# Patient Record
Sex: FEMALE | Race: WHITE | Employment: UNEMPLOYED | URBAN - METROPOLITAN AREA
[De-identification: names, ages, dates, MRNs, and addresses within clinical notes are randomized per-mention and may not be internally consistent; named-entity substitution may affect disease eponyms.]

---

## 2020-09-21 ENCOUNTER — APPOINTMENT (OUTPATIENT)
Dept: CT IMAGING | Age: 75
DRG: 175 | End: 2020-09-21
Payer: MEDICARE

## 2020-09-21 ENCOUNTER — APPOINTMENT (OUTPATIENT)
Dept: ULTRASOUND IMAGING | Age: 75
DRG: 175 | End: 2020-09-21
Payer: MEDICARE

## 2020-09-21 ENCOUNTER — HOSPITAL ENCOUNTER (INPATIENT)
Age: 75
LOS: 2 days | Discharge: HOME | DRG: 175 | End: 2020-09-24
Attending: INTERNAL MEDICINE | Admitting: INTERNAL MEDICINE
Payer: MEDICARE

## 2020-09-21 PROBLEM — I10 ESSENTIAL HYPERTENSION: Status: ACTIVE | Noted: 2020-09-21

## 2020-09-21 PROBLEM — E78.2 MIXED HYPERLIPIDEMIA: Status: ACTIVE | Noted: 2020-09-21

## 2020-09-21 PROBLEM — E03.9 HYPOTHYROID: Status: ACTIVE | Noted: 2020-09-21

## 2020-09-21 PROBLEM — R77.8 ELEVATED TROPONIN: Status: ACTIVE | Noted: 2020-09-21

## 2020-09-21 PROBLEM — R55 SYNCOPE AND COLLAPSE: Status: ACTIVE | Noted: 2020-09-21

## 2020-09-21 PROBLEM — I82.4Y1 ACUTE DEEP VEIN THROMBOSIS (DVT) OF PROXIMAL VEIN OF RIGHT LOWER EXTREMITY (HCC): Status: ACTIVE | Noted: 2020-09-21

## 2020-09-21 LAB
ALBUMIN SERPL-MCNC: 3.5 G/DL (ref 3.5–4.6)
ALP BLD-CCNC: 55 U/L (ref 40–130)
ALT SERPL-CCNC: 9 U/L (ref 0–33)
ANION GAP SERPL CALCULATED.3IONS-SCNC: 10 MEQ/L (ref 9–15)
AST SERPL-CCNC: 20 U/L (ref 0–35)
BASOPHILS ABSOLUTE: 0.1 K/UL (ref 0–0.2)
BASOPHILS RELATIVE PERCENT: 0.7 %
BILIRUB SERPL-MCNC: <0.2 MG/DL (ref 0.2–0.7)
BUN BLDV-MCNC: 16 MG/DL (ref 8–23)
CALCIUM SERPL-MCNC: 8.8 MG/DL (ref 8.5–9.9)
CHLORIDE BLD-SCNC: 102 MEQ/L (ref 95–107)
CO2: 23 MEQ/L (ref 20–31)
CREAT SERPL-MCNC: 0.55 MG/DL (ref 0.5–0.9)
EOSINOPHILS ABSOLUTE: 0.1 K/UL (ref 0–0.7)
EOSINOPHILS RELATIVE PERCENT: 1.1 %
GFR AFRICAN AMERICAN: >60
GFR NON-AFRICAN AMERICAN: >60
GLOBULIN: 2.6 G/DL (ref 2.3–3.5)
GLUCOSE BLD-MCNC: 99 MG/DL (ref 70–99)
HCT VFR BLD CALC: 39.3 % (ref 37–47)
HEMOGLOBIN: 13.6 G/DL (ref 12–16)
LYMPHOCYTES ABSOLUTE: 1.3 K/UL (ref 1–4.8)
LYMPHOCYTES RELATIVE PERCENT: 15.3 %
MAGNESIUM: 1.7 MG/DL (ref 1.7–2.4)
MCH RBC QN AUTO: 33.3 PG (ref 27–31.3)
MCHC RBC AUTO-ENTMCNC: 34.7 % (ref 33–37)
MCV RBC AUTO: 96 FL (ref 82–100)
MONOCYTES ABSOLUTE: 1 K/UL (ref 0.2–0.8)
MONOCYTES RELATIVE PERCENT: 12.2 %
NEUTROPHILS ABSOLUTE: 5.9 K/UL (ref 1.4–6.5)
NEUTROPHILS RELATIVE PERCENT: 70.7 %
PDW BLD-RTO: 12.8 % (ref 11.5–14.5)
PLATELET # BLD: 210 K/UL (ref 130–400)
POTASSIUM SERPL-SCNC: 4.1 MEQ/L (ref 3.4–4.9)
RBC # BLD: 4.1 M/UL (ref 4.2–5.4)
SODIUM BLD-SCNC: 135 MEQ/L (ref 135–144)
TOTAL PROTEIN: 6.1 G/DL (ref 6.3–8)
TROPONIN: 0.02 NG/ML (ref 0–0.01)
TSH SERPL DL<=0.05 MIU/L-ACNC: 2 UIU/ML (ref 0.44–3.86)
WBC # BLD: 8.4 K/UL (ref 4.8–10.8)

## 2020-09-21 PROCEDURE — 36415 COLL VENOUS BLD VENIPUNCTURE: CPT

## 2020-09-21 PROCEDURE — 80053 COMPREHEN METABOLIC PANEL: CPT

## 2020-09-21 PROCEDURE — 99285 EMERGENCY DEPT VISIT HI MDM: CPT

## 2020-09-21 PROCEDURE — 83735 ASSAY OF MAGNESIUM: CPT

## 2020-09-21 PROCEDURE — 71275 CT ANGIOGRAPHY CHEST: CPT

## 2020-09-21 PROCEDURE — G0378 HOSPITAL OBSERVATION PER HR: HCPCS

## 2020-09-21 PROCEDURE — 93005 ELECTROCARDIOGRAM TRACING: CPT | Performed by: PHYSICIAN ASSISTANT

## 2020-09-21 PROCEDURE — 84443 ASSAY THYROID STIM HORMONE: CPT

## 2020-09-21 PROCEDURE — 85025 COMPLETE CBC W/AUTO DIFF WBC: CPT

## 2020-09-21 PROCEDURE — 6360000002 HC RX W HCPCS: Performed by: PHYSICIAN ASSISTANT

## 2020-09-21 PROCEDURE — 96372 THER/PROPH/DIAG INJ SC/IM: CPT

## 2020-09-21 PROCEDURE — 93971 EXTREMITY STUDY: CPT

## 2020-09-21 PROCEDURE — 84484 ASSAY OF TROPONIN QUANT: CPT

## 2020-09-21 PROCEDURE — 70450 CT HEAD/BRAIN W/O DYE: CPT

## 2020-09-21 RX ORDER — LISINOPRIL AND HYDROCHLOROTHIAZIDE 25; 20 MG/1; MG/1
1 TABLET ORAL DAILY
Status: ON HOLD | COMMUNITY
End: 2020-09-23 | Stop reason: HOSPADM

## 2020-09-21 RX ORDER — POLYETHYLENE GLYCOL 3350 17 G/17G
17 POWDER, FOR SOLUTION ORAL DAILY PRN
Status: DISCONTINUED | OUTPATIENT
Start: 2020-09-21 | End: 2020-09-24 | Stop reason: HOSPADM

## 2020-09-21 RX ORDER — LEVOTHYROXINE SODIUM 88 UG/1
88 TABLET ORAL DAILY
COMMUNITY

## 2020-09-21 RX ORDER — ACETAMINOPHEN 650 MG/1
650 SUPPOSITORY RECTAL EVERY 6 HOURS PRN
Status: DISCONTINUED | OUTPATIENT
Start: 2020-09-21 | End: 2020-09-24 | Stop reason: HOSPADM

## 2020-09-21 RX ORDER — SODIUM CHLORIDE 0.9 % (FLUSH) 0.9 %
10 SYRINGE (ML) INJECTION EVERY 12 HOURS SCHEDULED
Status: DISCONTINUED | OUTPATIENT
Start: 2020-09-21 | End: 2020-09-24 | Stop reason: HOSPADM

## 2020-09-21 RX ORDER — SIMVASTATIN 40 MG
40 TABLET ORAL NIGHTLY
COMMUNITY

## 2020-09-21 RX ORDER — ONDANSETRON 2 MG/ML
4 INJECTION INTRAMUSCULAR; INTRAVENOUS EVERY 6 HOURS PRN
Status: DISCONTINUED | OUTPATIENT
Start: 2020-09-21 | End: 2020-09-24 | Stop reason: HOSPADM

## 2020-09-21 RX ORDER — PROMETHAZINE HYDROCHLORIDE 12.5 MG/1
12.5 TABLET ORAL EVERY 6 HOURS PRN
Status: DISCONTINUED | OUTPATIENT
Start: 2020-09-21 | End: 2020-09-24 | Stop reason: HOSPADM

## 2020-09-21 RX ORDER — ACETAMINOPHEN 325 MG/1
650 TABLET ORAL EVERY 6 HOURS PRN
Status: DISCONTINUED | OUTPATIENT
Start: 2020-09-21 | End: 2020-09-24 | Stop reason: HOSPADM

## 2020-09-21 RX ORDER — SODIUM CHLORIDE 0.9 % (FLUSH) 0.9 %
10 SYRINGE (ML) INJECTION PRN
Status: DISCONTINUED | OUTPATIENT
Start: 2020-09-21 | End: 2020-09-24 | Stop reason: HOSPADM

## 2020-09-21 RX ADMIN — ENOXAPARIN SODIUM 80 MG: 80 INJECTION SUBCUTANEOUS at 22:05

## 2020-09-22 ENCOUNTER — APPOINTMENT (OUTPATIENT)
Dept: ULTRASOUND IMAGING | Age: 75
DRG: 175 | End: 2020-09-22
Payer: MEDICARE

## 2020-09-22 PROBLEM — I26.09 PULMONARY EMBOLISM WITH ACUTE COR PULMONALE (HCC): Status: ACTIVE | Noted: 2020-09-22

## 2020-09-22 PROBLEM — I26.99 ACUTE PULMONARY EMBOLISM (HCC): Status: ACTIVE | Noted: 2020-09-22

## 2020-09-22 LAB
ALBUMIN SERPL-MCNC: 3.6 G/DL (ref 3.5–4.6)
ALP BLD-CCNC: 55 U/L (ref 40–130)
ALT SERPL-CCNC: 10 U/L (ref 0–33)
ANION GAP SERPL CALCULATED.3IONS-SCNC: 13 MEQ/L (ref 9–15)
AST SERPL-CCNC: 17 U/L (ref 0–35)
BASOPHILS ABSOLUTE: 0.1 K/UL (ref 0–0.2)
BASOPHILS RELATIVE PERCENT: 1.1 %
BILIRUB SERPL-MCNC: 0.4 MG/DL (ref 0.2–0.7)
BUN BLDV-MCNC: 13 MG/DL (ref 8–23)
CALCIUM SERPL-MCNC: 9 MG/DL (ref 8.5–9.9)
CHLORIDE BLD-SCNC: 105 MEQ/L (ref 95–107)
CO2: 22 MEQ/L (ref 20–31)
CREAT SERPL-MCNC: 0.48 MG/DL (ref 0.5–0.9)
EKG ATRIAL RATE: 75 BPM
EKG ATRIAL RATE: 76 BPM
EKG P AXIS: 64 DEGREES
EKG P AXIS: 74 DEGREES
EKG P-R INTERVAL: 170 MS
EKG P-R INTERVAL: 170 MS
EKG Q-T INTERVAL: 390 MS
EKG Q-T INTERVAL: 418 MS
EKG QRS DURATION: 92 MS
EKG QRS DURATION: 94 MS
EKG QTC CALCULATION (BAZETT): 438 MS
EKG QTC CALCULATION (BAZETT): 466 MS
EKG R AXIS: -56 DEGREES
EKG R AXIS: -62 DEGREES
EKG T AXIS: -11 DEGREES
EKG T AXIS: -14 DEGREES
EKG VENTRICULAR RATE: 75 BPM
EKG VENTRICULAR RATE: 76 BPM
EOSINOPHILS ABSOLUTE: 0.1 K/UL (ref 0–0.7)
EOSINOPHILS RELATIVE PERCENT: 0.6 %
GFR AFRICAN AMERICAN: >60
GFR NON-AFRICAN AMERICAN: >60
GLOBULIN: 2.8 G/DL (ref 2.3–3.5)
GLUCOSE BLD-MCNC: 102 MG/DL (ref 70–99)
HCT VFR BLD CALC: 41.4 % (ref 37–47)
HEMOGLOBIN: 14.3 G/DL (ref 12–16)
LV EF: 65 %
LVEF MODALITY: NORMAL
LYMPHOCYTES ABSOLUTE: 1 K/UL (ref 1–4.8)
LYMPHOCYTES RELATIVE PERCENT: 12 %
MCH RBC QN AUTO: 33.5 PG (ref 27–31.3)
MCHC RBC AUTO-ENTMCNC: 34.5 % (ref 33–37)
MCV RBC AUTO: 97 FL (ref 82–100)
MONOCYTES ABSOLUTE: 0.6 K/UL (ref 0.2–0.8)
MONOCYTES RELATIVE PERCENT: 8 %
NEUTROPHILS ABSOLUTE: 6.3 K/UL (ref 1.4–6.5)
NEUTROPHILS RELATIVE PERCENT: 78.3 %
PDW BLD-RTO: 12.8 % (ref 11.5–14.5)
PLATELET # BLD: 226 K/UL (ref 130–400)
POTASSIUM REFLEX MAGNESIUM: 4.2 MEQ/L (ref 3.4–4.9)
RBC # BLD: 4.26 M/UL (ref 4.2–5.4)
SODIUM BLD-SCNC: 140 MEQ/L (ref 135–144)
TOTAL PROTEIN: 6.4 G/DL (ref 6.3–8)
TROPONIN: 0.01 NG/ML (ref 0–0.01)
TROPONIN: <0.01 NG/ML (ref 0–0.01)
WBC # BLD: 8.1 K/UL (ref 4.8–10.8)

## 2020-09-22 PROCEDURE — 80053 COMPREHEN METABOLIC PANEL: CPT

## 2020-09-22 PROCEDURE — 93010 ELECTROCARDIOGRAM REPORT: CPT | Performed by: INTERNAL MEDICINE

## 2020-09-22 PROCEDURE — 36415 COLL VENOUS BLD VENIPUNCTURE: CPT

## 2020-09-22 PROCEDURE — 85025 COMPLETE CBC W/AUTO DIFF WBC: CPT

## 2020-09-22 PROCEDURE — 6360000004 HC RX CONTRAST MEDICATION: Performed by: PHYSICIAN ASSISTANT

## 2020-09-22 PROCEDURE — 93306 TTE W/DOPPLER COMPLETE: CPT

## 2020-09-22 PROCEDURE — 93880 EXTRACRANIAL BILAT STUDY: CPT | Performed by: INTERNAL MEDICINE

## 2020-09-22 PROCEDURE — 6370000000 HC RX 637 (ALT 250 FOR IP): Performed by: NURSE PRACTITIONER

## 2020-09-22 PROCEDURE — 6370000000 HC RX 637 (ALT 250 FOR IP): Performed by: INTERNAL MEDICINE

## 2020-09-22 PROCEDURE — 2580000003 HC RX 258: Performed by: NURSE PRACTITIONER

## 2020-09-22 PROCEDURE — 2060000000 HC ICU INTERMEDIATE R&B

## 2020-09-22 PROCEDURE — 84484 ASSAY OF TROPONIN QUANT: CPT

## 2020-09-22 PROCEDURE — 99222 1ST HOSP IP/OBS MODERATE 55: CPT | Performed by: INTERNAL MEDICINE

## 2020-09-22 PROCEDURE — 93005 ELECTROCARDIOGRAM TRACING: CPT | Performed by: NURSE PRACTITIONER

## 2020-09-22 PROCEDURE — 96372 THER/PROPH/DIAG INJ SC/IM: CPT

## 2020-09-22 PROCEDURE — 99255 IP/OBS CONSLTJ NEW/EST HI 80: CPT | Performed by: INTERNAL MEDICINE

## 2020-09-22 PROCEDURE — 93880 EXTRACRANIAL BILAT STUDY: CPT

## 2020-09-22 PROCEDURE — 6360000002 HC RX W HCPCS: Performed by: NURSE PRACTITIONER

## 2020-09-22 RX ORDER — SIMVASTATIN 20 MG
40 TABLET ORAL NIGHTLY
Status: DISCONTINUED | OUTPATIENT
Start: 2020-09-22 | End: 2020-09-22 | Stop reason: CLARIF

## 2020-09-22 RX ORDER — LEVOTHYROXINE SODIUM 88 UG/1
88 TABLET ORAL DAILY
Status: DISCONTINUED | OUTPATIENT
Start: 2020-09-22 | End: 2020-09-24 | Stop reason: HOSPADM

## 2020-09-22 RX ORDER — ASPIRIN 81 MG/1
81 TABLET ORAL DAILY
Status: DISCONTINUED | OUTPATIENT
Start: 2020-09-22 | End: 2020-09-22

## 2020-09-22 RX ORDER — ATORVASTATIN CALCIUM 20 MG/1
20 TABLET, FILM COATED ORAL NIGHTLY
Status: DISCONTINUED | OUTPATIENT
Start: 2020-09-22 | End: 2020-09-24 | Stop reason: HOSPADM

## 2020-09-22 RX ADMIN — LEVOTHYROXINE SODIUM 88 MCG: 88 TABLET ORAL at 06:13

## 2020-09-22 RX ADMIN — METOPROLOL TARTRATE 25 MG: 25 TABLET, FILM COATED ORAL at 10:23

## 2020-09-22 RX ADMIN — ATORVASTATIN CALCIUM 20 MG: 20 TABLET, FILM COATED ORAL at 20:32

## 2020-09-22 RX ADMIN — METOPROLOL TARTRATE 25 MG: 25 TABLET, FILM COATED ORAL at 20:32

## 2020-09-22 RX ADMIN — Medication 10 ML: at 20:33

## 2020-09-22 RX ADMIN — Medication 10 ML: at 07:33

## 2020-09-22 RX ADMIN — ENOXAPARIN SODIUM 80 MG: 80 INJECTION SUBCUTANEOUS at 07:33

## 2020-09-22 RX ADMIN — ENOXAPARIN SODIUM 80 MG: 80 INJECTION SUBCUTANEOUS at 20:33

## 2020-09-22 RX ADMIN — IOPAMIDOL 100 ML: 612 INJECTION, SOLUTION INTRAVENOUS at 00:15

## 2020-09-22 ASSESSMENT — ENCOUNTER SYMPTOMS
PHOTOPHOBIA: 0
VOICE CHANGE: 0
SHORTNESS OF BREATH: 0
SORE THROAT: 0
APNEA: 0
WHEEZING: 0
VOMITING: 0
GASTROINTESTINAL NEGATIVE: 1
COUGH: 0
RHINORRHEA: 0
CHEST TIGHTNESS: 1
EYE DISCHARGE: 0
STRIDOR: 0
NAUSEA: 0
ABDOMINAL PAIN: 0
CHEST TIGHTNESS: 0
BACK PAIN: 0
BLOOD IN STOOL: 0
ANAL BLEEDING: 0
ABDOMINAL DISTENTION: 0
ALLERGIC/IMMUNOLOGIC NEGATIVE: 1
SHORTNESS OF BREATH: 1
EYES NEGATIVE: 1

## 2020-09-22 ASSESSMENT — PAIN SCALES - GENERAL
PAINLEVEL_OUTOF10: 0

## 2020-09-22 NOTE — CARE COORDINATION
[facility] Case Management Initial Discharge Assessment    Met with Patient to discuss discharge plan. PCP: Jac Wooten DO                                Date of Last Visit:  YEARLY    If no PCP, list provided? N/A    Discharge Planning    Living Arrangements: independently at home    Who do you live with? SPOUSE    Who helps you with your care:  self    If lives at home:     Do you have any barriers navigating in your home? no    Patient can perform ADL? Yes    Current Services (outpatient and in home) :  None    Dialysis: No    Is transportation available to get to your appointments? Yes    DME Equipment:  yes - [facility]    Respiratory equipment: None    Respiratory provider:  no     Pharmacy:  yes - [facility]    Consult with Medication Assistance Program?  No      Patient agreeable to [place] [address]? Declined    Patient agreeable to SNF/Rehab? N/A    Other discharge needs identified? N/A    Freedom of choice list provided with basic dialogue that supports the patient's individualized plan of care/goals and shares the quality data associated with the providers. N/A    Does Patient Have a High-Risk for Readmission Diagnosis (CHF, PN, MI, COPD)? No      The plan for Transition of Care is related to the following treatment goals:     Initial Discharge Plan? (Note: please see concurrent daily documentation for any updates after initial note).     HOME, DENIES NEEDS    The Patient and/or patient representative: PATIENT was provided with choice of any post-acute providers for care and equipment and agrees with discharge plan  Yes    Electronically signed by Senia Navarro RN on 9/22/2020 at 2:05 PM

## 2020-09-22 NOTE — CONSULTS
Consults    Patient Name: Nelli Pena  Admit Date: 2020  7:12 PM  MR #: [id number]  : 1945    Attending Physician: Panfilo Clay MD  Reason for consult: PE    History of Presenting Illness:      Nelli Pena is a 66 y.o. female on hospital day 0 with a history of . History Obtained From:  patient, electronic medical record    Admitted with SOB weakness and loss of appetite. RLE edema noted for several days. -RLE DVT and B/L PE. Unfortunately discovered ALE Lung mass 2cm meng[icios for cancer. In recent days she has had cople episodes of syncope. Trops detected. CT chest shows RV strain. Denies CP. Feels some better. No prior CAD PAD nor CVA    Continued smoker  _+HF heart  History:      EKG:SR  Past Medical History:   Diagnosis Date    Hyperlipidemia     Hypertension     Pleurisy     Thyroid disease      Past Surgical History:   Procedure Laterality Date    JOINT REPLACEMENT      Right hip       Family History  History reviewed. No pertinent family history.   [] Unable to obtain due to ventilated and/ or neurologic status    Social History     Socioeconomic History    Marital status:      Spouse name: Not on file    Number of children: Not on file    Years of education: Not on file    Highest education level: Not on file   Occupational History    Not on file   Social Needs    Financial resource strain: Not on file    Food insecurity     Worry: Not on file     Inability: Not on file    Transportation needs     Medical: Not on file     Non-medical: Not on file   Tobacco Use    Smoking status: Current Every Day Smoker     Packs/day: 1.00     Types: Cigarettes    Smokeless tobacco: Never Used   Substance and Sexual Activity    Alcohol use: Not Currently    Drug use: Not Currently    Sexual activity: Not Currently   Lifestyle    Physical activity     Days per week: Not on file     Minutes per session: Not on file    Stress: Not on file   Relationships    Social connections     Talks on phone: Not on file     Gets together: Not on file     Attends Buddhism service: Not on file     Active member of club or organization: Not on file     Attends meetings of clubs or organizations: Not on file     Relationship status: Not on file    Intimate partner violence     Fear of current or ex partner: Not on file     Emotionally abused: Not on file     Physically abused: Not on file     Forced sexual activity: Not on file   Other Topics Concern    Not on file   Social History Narrative    Not on file      [] Unable to obtain due to ventilated and/ or neurologic status      Home Medications:      Medications Prior to Admission: simvastatin (ZOCOR) 40 MG tablet, Take 40 mg by mouth nightly  levothyroxine (SYNTHROID) 88 MCG tablet, Take 88 mcg by mouth Daily  lisinopril-hydroCHLOROthiazide (PRINZIDE;ZESTORETIC) 20-25 MG per tablet, Take 1 tablet by mouth daily    Current Hospital Medications:     Scheduled Meds:   levothyroxine  88 mcg Oral Daily    atorvastatin  20 mg Oral Nightly    aspirin  81 mg Oral Daily    metoprolol tartrate  25 mg Oral BID    sodium chloride flush  10 mL Intravenous 2 times per day    enoxaparin  1 mg/kg Subcutaneous BID     Continuous Infusions:  PRN Meds:.sodium chloride flush, acetaminophen **OR** acetaminophen, polyethylene glycol, promethazine **OR** ondansetron  . Allergies: Allergies   Allergen Reactions    Asa [Aspirin]     Pcn [Penicillins]         Review of Systems:       Review of Systems   Constitutional: Positive for appetite change. Negative for diaphoresis and fatigue. HENT: Negative. Eyes: Negative. Respiratory: Positive for shortness of breath. Negative for cough, chest tightness, wheezing and stridor. Cardiovascular: Positive for leg swelling. Negative for chest pain and palpitations. Gastrointestinal: Negative. Negative for blood in stool and nausea. Genitourinary: Negative. Musculoskeletal: Negative. Skin: Negative. Neurological: Positive for weakness. Negative for dizziness, syncope and light-headedness. Hematological: Negative. Psychiatric/Behavioral: Negative. Objective Findings:     Vitals:BP (!) 100/53   Pulse 64   Temp 98.1 °F (36.7 °C) (Oral)   Resp 17   Ht 5' 8\" (1.727 m)   Wt 180 lb (81.6 kg)   SpO2 94%   BMI 27.37 kg/m²      Physical Examination:    Physical Exam   Constitutional: No distress. She appears acutely ill. HENT:   Normal cephalic and Atraumatic   Eyes: Pupils are equal, round, and reactive to light. Neck: Normal range of motion and thyroid normal. Neck supple. No JVD present. No neck adenopathy. No thyromegaly present. Cardiovascular: Normal rate, regular rhythm, intact distal pulses and normal pulses. Murmur heard. Pulmonary/Chest: Effort normal and breath sounds normal. She has no wheezes. She has no rales. She exhibits no tenderness. Abdominal: Soft. Bowel sounds are normal. There is no abdominal tenderness. Musculoskeletal: Normal range of motion. General: Edema (RLE 2+) present. No tenderness. Neurological: She is alert and oriented to person, place, and time. Skin: Skin is warm. No cyanosis. Nails show no clubbing.          Results/ Medications reviewed 9/22/2020, 10:00 AM     Laboratory, Microbiology, Pathology, Radiology, Cardiology, Medications and Transcriptions reviewed  Scheduled Meds:   levothyroxine  88 mcg Oral Daily    atorvastatin  20 mg Oral Nightly    aspirin  81 mg Oral Daily    metoprolol tartrate  25 mg Oral BID    sodium chloride flush  10 mL Intravenous 2 times per day    enoxaparin  1 mg/kg Subcutaneous BID     Continuous Infusions:    Recent Labs     09/21/20 1945 09/22/20  0740   WBC 8.4 8.1   HGB 13.6 14.3   HCT 39.3 41.4   MCV 96.0 97.0    226     Recent Labs     09/21/20 1945 09/22/20  0740    140   K 4.1 4.2    105   CO2 23 22   BUN 16 13   CREATININE 0.55 0.48*     Recent Labs 09/21/20 1945 09/22/20  0740   AST 20 17   ALT 9 10   BILITOT <0.2 0.4   ALKPHOS 55 55     No results for input(s): LIPASE, AMYLASE in the last 72 hours. Recent Labs     09/21/20 1945 09/22/20  0740   PROT 6.1* 6.4     Ct Head Wo Contrast    Result Date: 9/22/2020  CT Brain Contrast medium:  Not utilized. History:  fall Comparison: Findings: There are no extra-axial collections. There is no evidence of hemorrhage. .  There is mild prominence of the and ventricles indicating chronic involutional changes. .  The basilar cisterns are patent. .  There are no focal lesions or areas of increased or decreased attenuation. .  There is no midline shift. .  The posterior fossa and craniocervical junction are within normal limits. .  The visualized portions of the paranasal sinuses and mastoid air cells are unremarkable. .  The visualized portions of the orbits are within normal limits. The bones are intact. . Incidentally noted there are heavy calcifications of the bilateral palatine tonsils, of uncertain clinical significance. Impression: THERE ARE NO ACUTE INTRACRANIAL CHANGES. THERE IS MILD PROMINENCE OF SULCI AND VENTRICLES WITH PERIVENTRICULAR WHITE MATTER HYPOINTENSITIES WHICH ARE USUALLY ASSOCIATED WITH CHRONIC MICROANGIOPATHY. POSSIBLE EXACERBATING CLINICAL FACTORS INCLUDE DIABETES, HYPERTENSION, HYPERLIPIDEMIA, TOBACCO ABUSE OR OTHER ETIOLOGIES. All CT scans at this facility use dose modulation, iterative reconstruction, and/or weight based dosing when appropriate to reduce radiation dose to as low as reasonably achievable.        Active Hospital Problems    Diagnosis Date Noted    Acute pulmonary embolism ([address] [I26.99] 09/22/2020     Priority: Low    Acute deep vein thrombosis (DVT) of proximal vein of right lower extremity ([address] [I82.4Y1] 09/21/2020     Priority: Low    Essential hypertension [I10] 09/21/2020     Priority: Low    Hypothyroid [E03.9] 09/21/2020     Priority: Low    Mixed hyperlipidemia [E78.2] 09/21/2020     Priority: Low    Syncope and collapse [R55] 09/21/2020     Priority: Low    Elevated troponin [R79.89] 09/21/2020     Priority: Low         Impression/Plan:   1. B/L PE from RLE- on Lovenox. Stable. Feels better. Change to NOAC prior to dc.  2. Wi;; need to rreview Echo  3. Syncope- due to hypoxia vs. Carotid dz. Will check CUS. 4. HTN Stable  5. Add ASA and BB. Continue Statin. 6. Stop smoking     Thank you for allowing us to participate in the care of this patient. Will continue to follow. Please call if questions or concerns arise.     Electronically signed by Leana Noyola MD on 9/22/2020 at 10:00 AM

## 2020-09-22 NOTE — ED PROVIDER NOTES
[facility]  eMERGENCY dEPARTMENT eNCOUnter      Pt Name: Daniel Tomas  MRN: [id number]  [place] 1945  Date of evaluation: 9/21/2020  Provider: Alexandra Soto PA-C    CHIEF COMPLAINT       Chief Complaint   Patient presents with    Other     increased weakness, leg swelling in right leg \"not feeling well since friday/sat\" decreased appetite increased fatigue         HISTORY OF PRESENT ILLNESS   (Location/Symptom, Timing/Onset,Context/Setting, Quality, Duration, Modifying Factors, Severity)  Note limiting factors. Daniel Tomas is a 66 y.o. female who presents to the emergency department not been feeling well since last Thursday notes that she became dizzy and fell on her coffee table she states she sat down but family notes that she was lying down at some point she also has some right leg which is warm and swollen she has had decreased appetite she also notes that she discontinued her blood pressure medications after this episode. She was concerned that her blood pressure was too low. Family notes that patient was reluctant to provide all details. Symptoms are moderate in severity nothing improves her symptoms nothing worsens her symptoms. HPI    NursingNotes were reviewed. REVIEW OF SYSTEMS    (2-9 systems for level 4, 10 or more for level 5)     Review of Systems   Constitutional: Negative for activity change, appetite change and unexpected weight change. HENT: Negative for ear discharge, nosebleeds and voice change. Eyes: Negative for photophobia and discharge. Respiratory: Negative for apnea, cough and shortness of breath. Cardiovascular: Negative for chest pain. Gastrointestinal: Negative for abdominal distention, anal bleeding, nausea and vomiting. Endocrine: Negative for cold intolerance, heat intolerance and polyphagia. Genitourinary: Negative for difficulty urinating, flank pain and genital sores.    Musculoskeletal: Negative for back pain, joint swelling, neck pain and neck stiffness. Skin: Negative for pallor. Allergic/Immunologic: Negative for immunocompromised state. Neurological: Positive for dizziness. Negative for seizures and facial asymmetry. Hematological: Does not bruise/bleed easily. Psychiatric/Behavioral: Negative for behavioral problems, self-injury and sleep disturbance. All other systems reviewed and are negative. Except as noted above the remainder of the review of systems was reviewed and negative. PAST MEDICAL HISTORY     Past Medical History:   Diagnosis Date    Hyperlipidemia     Hypertension     Pleurisy     Thyroid disease          SURGICALHISTORY       Past Surgical History:   Procedure Laterality Date    JOINT REPLACEMENT      Right hip         CURRENT MEDICATIONS       Current Discharge Medication List      CONTINUE these medications which have NOT CHANGED    Details   simvastatin (ZOCOR) 40 MG tablet Take 40 mg by mouth nightly      levothyroxine (SYNTHROID) 88 MCG tablet Take 88 mcg by mouth Daily      lisinopril-hydroCHLOROthiazide (PRINZIDE;ZESTORETIC) 20-25 MG per tablet Take 1 tablet by mouth daily             ALLERGIES     Asa [aspirin] and Pcn [penicillins]    FAMILY HISTORY     History reviewed. No pertinent family history.        SOCIAL HISTORY       Social History     Socioeconomic History    Marital status:      Spouse name: None    Number of children: None    Years of education: None    Highest education level: None   Occupational History    None   Social Needs    Financial resource strain: None    Food insecurity     Worry: None     Inability: None    Transportation needs     Medical: None     Non-medical: None   Tobacco Use    Smoking status: Current Every Day Smoker     Packs/day: 1.00     Types: Cigarettes    Smokeless tobacco: Never Used   Substance and Sexual Activity    Alcohol use: Not Currently    Drug use: Not Currently    Sexual activity: Not Currently   Lifestyle    Physical activity     Days per week: None     Minutes per session: None    Stress: None   Relationships    Social connections     Talks on phone: None     Gets together: None     Attends [employer] service: None     Active member of club or organization: None     Attends meetings of clubs or organizations: None     Relationship status: None    Intimate partner violence     Fear of current or ex partner: None     Emotionally abused: None     Physically abused: None     Forced sexual activity: None   Other Topics Concern    None   Social History Narrative    None       SCREENINGS    [place] Coma Scale  Eye Opening: Spontaneous  Best Verbal Response: Oriented  Best Motor Response: Obeys commands  [place] Coma Scale Score: 15 @FLOW([id number])@      PHYSICAL EXAM    (up to 7 for level 4, 8 or more for level 5)     ED Triage Vitals [09/21/20 1910]   BP Temp Temp Source Pulse Resp SpO2 Height Weight   111/65 98.9 °F (37.2 °C) Oral 85 18 98 % 5' 8\" (1.727 m) 180 lb (81.6 kg)       Physical Exam  Vitals signs and nursing note reviewed. Constitutional:       General: She is not in acute distress. Appearance: Normal appearance. She is well-developed. HENT:      Head: Normocephalic and atraumatic. Right Ear: Tympanic membrane normal.      Left Ear: Tympanic membrane normal.      Nose: Nose normal.      Mouth/Throat:      Mouth: Mucous membranes are moist.      Pharynx: No oropharyngeal exudate or posterior oropharyngeal erythema. Eyes:      General:         Right eye: No discharge. Left eye: No discharge. Extraocular Movements: Extraocular movements intact. Pupils: Pupils are equal, round, and reactive to light. Neck:      Musculoskeletal: Normal range of motion and neck supple. Cardiovascular:      Rate and Rhythm: Normal rate and regular rhythm. Pulses: Normal pulses. Heart sounds: Normal heart sounds.    Pulmonary:      Effort: Pulmonary effort is normal. No respiratory TROPONIN - Abnormal; Notable for the following components:    Troponin 0.019 (*)     All other components within normal limits    Narrative:     Nahid Cole  [facility] tel. [phone],  TROP results called to and read back by Umm Rodriguez, 09/21/2020 21:09,  by [facility]   MAGNESIUM   TSH WITHOUT REFLEX    Narrative:     Saji Cortez tel. [phone],  TROP results called to and read back by Umm Rodriguez, 09/21/2020 21:09,  by [id number] PANEL W/ REFLEX TO MG FOR LOW K   CBC WITH AUTO DIFFERENTIAL   TROPONIN   TROPONIN       All other labs were within normal range or not returned as of this dictation. EMERGENCY DEPARTMENT COURSE and DIFFERENTIAL DIAGNOSIS/MDM:   Vitals:    Vitals:    09/21/20 2115 09/21/20 2230 09/21/20 2315 09/22/20 0030   BP: 112/68 131/67 124/82 114/65   Pulse: 84 77 73 80   Resp: 18 18 18 16   Temp:    98.2 °F (36.8 °C)   TempSrc:    Oral   SpO2: 99% 98% 99% 100%   Weight:       Height:                MDM  Number of Diagnoses or Management Options  Diagnosis management comments: Noted to have extensive right-sided DVT had fall with out loss of consciousness per patient but will admit with elevated cardiac enzyme DVT. Discussed with Dr. Alejandra Herzog who will order CT of chest.  Spittle list group evaluates patient in emergency room       Amount and/or Complexity of Data Reviewed  Clinical lab tests: reviewed and ordered  Tests in the radiology section of CPT®: reviewed and ordered  Discuss the patient with other providers: yes        CRITICAL CARE TIME   Total Critical Care time was 31minutes, excluding separately reportableprocedures. There was a high probability of clinicallysignificant/life threatening deterioration in the patient's condition which required my urgent intervention.       CONSULTS:  IP CONSULT TO CARDIOLOGY  IP CONSULT TO PULMONOLOGY    PROCEDURES:  Unless otherwise noted below, none     Procedures    FINAL IMPRESSION      1.

## 2020-09-22 NOTE — ED NOTES
Patient taken to CT via cart by ayden Roman.  Patient will then be transported to [address] [place], [place] [address]KERMIT, RN  09/22/20 0003

## 2020-09-22 NOTE — H&P
[place] [address] MEDICINE    HISTORY AND PHYSICAL EXAM    PATIENT NAME:  Alysha Prather    MRN:  [id number]  SERVICE DATE:  9/21/2020   SERVICE TIME:  11:16 PM    Primary Care Physician: Lashanda Cannon DO         SUBJECTIVE  CHIEF COMPLAINT: Right ankle/lower leg swelling    HPI:  This is a 66 y.o. female who presents with right ankle and lower leg swelling for 5 days. Patient states that her ankle was swollen on Thursday with mild pain. Patient states that Friday \"she just did not feel right\". When asked what she meant she said that she did not have an appetite and was a little weak. When asked if she was short of breath patient denied at first.  However, her daughter is present and reminded her that she did state that she was short of breath mildly. When asked if she had any chest pain patient denied. However again her daughter reminded her that she did complain of 2 episodes of chest pain on Wednesday prior to the ankle swelling. I asked her about that and she states that it was a mild 2 out of 10 pain that felt like a \"wiggle\". On Saturday patient states and her daughter agrees that the ankle swelling seemed to lessen but her calf was now swollen as well. In addition on Saturday patient became dizzy while walking through her home. She states she did not fall and was able to lower herself to a chair and get to a table. However she does not recollect how she got from 1 room to another. Patient states she has a chronic cough which has not worsened. Patient is sitting in a position of comfort with no guarding or grimacing and no signs or symptoms of distress. Patient's past medical history includes being treated for pleurisy about 20 years ago. In addition she has hypertension, hyperlipidemia, and hypothyroidism. All of which are controlled with oral medications at home. Patient is a pack-a-day smoker. Patient lives at home with her . And attends to all of her own ADLs.     PAST MEDICAL HISTORY:    Past Medical History:   Diagnosis Date    Hyperlipidemia     Hypertension     Pleurisy     Thyroid disease      PAST SURGICAL HISTORY:    Past Surgical History:   Procedure Laterality Date    JOINT REPLACEMENT      Right hip     FAMILY HISTORY:  History reviewed. No pertinent family history. SOCIAL HISTORY:    Social History     Socioeconomic History    Marital status:      Spouse name: Not on file    Number of children: Not on file    Years of education: Not on file    Highest education level: Not on file   Occupational History    Not on file   Social Needs    Financial resource strain: Not on file    Food insecurity     Worry: Not on file     Inability: Not on file    Transportation needs     Medical: Not on file     Non-medical: Not on file   Tobacco Use    Smoking status: Current Every Day Smoker     Packs/day: 1.00     Types: Cigarettes    Smokeless tobacco: Never Used   Substance and Sexual Activity    Alcohol use: Not Currently    Drug use: Not Currently    Sexual activity: Not Currently   Lifestyle    Physical activity     Days per week: Not on file     Minutes per session: Not on file    Stress: Not on file   Relationships    Social connections     Talks on phone: Not on file     Gets together: Not on file     Attends [employer] service: Not on file     Active member of club or organization: Not on file     Attends meetings of clubs or organizations: Not on file     Relationship status: Not on file    Intimate partner violence     Fear of current or ex partner: Not on file     Emotionally abused: Not on file     Physically abused: Not on file     Forced sexual activity: Not on file   Other Topics Concern    Not on file   Social History Narrative    Not on file     MEDICATIONS:   Prior to Admission medications    Medication Sig Start Date End Date Taking?  Authorizing Provider   simvastatin (ZOCOR) 40 MG tablet Take 40 mg by mouth nightly   Yes Historical K/uL    Basophils Absolute 0.1 0.0 - 0.2 K/uL   Magnesium    Collection Time: 09/21/20  7:45 PM   Result Value Ref Range    Magnesium 1.7 1.7 - 2.4 mg/dL   Troponin    Collection Time: 09/21/20  7:45 PM   Result Value Ref Range    Troponin 0.019 (HH) 0.000 - 0.010 ng/mL   TSH without Reflex    Collection Time: 09/21/20  7:45 PM   Result Value Ref Range    TSH 2.000 0.440 - 3.860 uIU/mL       IMAGING:  No results found. VTE Prophylaxis: Lovenox    ASSESSMENT AND PLAN  Principal Problem:   1) Acute deep vein thrombosis (DVT) of proximal vein of right lower extremity ([address]): Right lower extremity edema. Ultrasound positive for occlusive thrombus in the right popliteal vein and probable thrombus in the right calf vein. In addition there is an occlusive thrombus in the right superficial femoral vein that appears mobile. We will place patient on Lovenox and consult cardiovascular surgeon. We will get CTA chest rule out PE. 2) Pulmonary Embolism: Chest CTA positive for large bilateral PE with cardiac strain. Possible neoplasm seen. We will consult cardiology and pulmonology. Patient on Lovenox. 3) Elevated troponin:  We will cycle troponins, get EKG repeated in the morning. Active Problems:   4) Essential hypertension: Patient on oral medications to control. We will monitor blood pressure regularly. We will resume home meds   5) Hypothyroid: Patient on oral medications to control. We will resume home meds   6)  Mixed hyperlipidemia: Patient on oral medications to control. We will resume home meds          Plan of care discussed with: patient and adult child    SIGNATURE: Nav Serrato RN, NP  DATE: September 21, 2020  TIME: 11:16 PM     BONNIE Curry MD - supervising physician

## 2020-09-22 NOTE — ED NOTES
Patient report called to nurse on [address]. Monitor room called for tele monitor, they will send it to the floor.       Chon Euceda RN  09/22/20 0000

## 2020-09-22 NOTE — PROGRESS NOTES
Patient daughter came to this RN. Stated she requested [facility] to see patient as patients  sees [facility] physicians. Message sent to Dr. Antoine Wills who is covering for Dr. Kendrick Moralez who seen patient this am. Per Dr. Antoine Wills, ok consult [facility]. Updated patient and daughter and consult put in for [facility].

## 2020-09-22 NOTE — PROGRESS NOTES
Patient returned from testing. Ambulated into room with walker, family at bedside. Bed alarm engaged and call light in reach.

## 2020-09-22 NOTE — ED NOTES
Patient taken to US via cart by [address] .       Filiberto Colvin, LEBRON  09/21/20 [id number]

## 2020-09-22 NOTE — FLOWSHEET NOTE
Patient arrived on floor from 2w. Patient denies complaints of shortness of breath or chest pain. Patient resting in bed. EKG done. Patient placed on telemetry monitor. Patient voices no needs at this time.

## 2020-09-23 LAB
ALBUMIN SERPL-MCNC: 3.3 G/DL (ref 3.5–4.6)
ALP BLD-CCNC: 50 U/L (ref 40–130)
ALT SERPL-CCNC: 10 U/L (ref 0–33)
ANION GAP SERPL CALCULATED.3IONS-SCNC: 9 MEQ/L (ref 9–15)
AST SERPL-CCNC: 17 U/L (ref 0–35)
BASOPHILS ABSOLUTE: 0 K/UL (ref 0–0.2)
BASOPHILS RELATIVE PERCENT: 0.1 %
BILIRUB SERPL-MCNC: 0.4 MG/DL (ref 0.2–0.7)
BUN BLDV-MCNC: 12 MG/DL (ref 8–23)
CALCIUM SERPL-MCNC: 8.7 MG/DL (ref 8.5–9.9)
CEA: 4.3 NG/ML (ref 0–5.5)
CHLORIDE BLD-SCNC: 102 MEQ/L (ref 95–107)
CO2: 25 MEQ/L (ref 20–31)
CREAT SERPL-MCNC: 0.52 MG/DL (ref 0.5–0.9)
EOSINOPHILS ABSOLUTE: 0.1 K/UL (ref 0–0.7)
EOSINOPHILS RELATIVE PERCENT: 1.3 %
GFR AFRICAN AMERICAN: >60
GFR NON-AFRICAN AMERICAN: >60
GLOBULIN: 2.5 G/DL (ref 2.3–3.5)
GLUCOSE BLD-MCNC: 101 MG/DL (ref 70–99)
HCT VFR BLD CALC: 38.5 % (ref 37–47)
HEMOGLOBIN: 13.1 G/DL (ref 12–16)
LACTATE DEHYDROGENASE: 220 U/L (ref 135–214)
LYMPHOCYTES ABSOLUTE: 1.1 K/UL (ref 1–4.8)
LYMPHOCYTES RELATIVE PERCENT: 16.3 %
MCH RBC QN AUTO: 32.9 PG (ref 27–31.3)
MCHC RBC AUTO-ENTMCNC: 34 % (ref 33–37)
MCV RBC AUTO: 96.9 FL (ref 82–100)
MONOCYTES ABSOLUTE: 0.8 K/UL (ref 0.2–0.8)
MONOCYTES RELATIVE PERCENT: 11.9 %
NEUTROPHILS ABSOLUTE: 4.8 K/UL (ref 1.4–6.5)
NEUTROPHILS RELATIVE PERCENT: 70.4 %
PDW BLD-RTO: 12.8 % (ref 11.5–14.5)
PLATELET # BLD: 237 K/UL (ref 130–400)
POTASSIUM REFLEX MAGNESIUM: 4.3 MEQ/L (ref 3.4–4.9)
RBC # BLD: 3.97 M/UL (ref 4.2–5.4)
SODIUM BLD-SCNC: 136 MEQ/L (ref 135–144)
TOTAL PROTEIN: 5.8 G/DL (ref 6.3–8)
WBC # BLD: 6.8 K/UL (ref 4.8–10.8)

## 2020-09-23 PROCEDURE — 85613 RUSSELL VIPER VENOM DILUTED: CPT

## 2020-09-23 PROCEDURE — 85730 THROMBOPLASTIN TIME PARTIAL: CPT

## 2020-09-23 PROCEDURE — 6370000000 HC RX 637 (ALT 250 FOR IP): Performed by: NURSE PRACTITIONER

## 2020-09-23 PROCEDURE — 85635 REPTILASE TEST: CPT

## 2020-09-23 PROCEDURE — 36415 COLL VENOUS BLD VENIPUNCTURE: CPT

## 2020-09-23 PROCEDURE — 85732 THROMBOPLASTIN TIME PARTIAL: CPT

## 2020-09-23 PROCEDURE — 2580000003 HC RX 258: Performed by: NURSE PRACTITIONER

## 2020-09-23 PROCEDURE — 85670 THROMBIN TIME PLASMA: CPT

## 2020-09-23 PROCEDURE — 86146 BETA-2 GLYCOPROTEIN ANTIBODY: CPT

## 2020-09-23 PROCEDURE — 6370000000 HC RX 637 (ALT 250 FOR IP): Performed by: FAMILY MEDICINE

## 2020-09-23 PROCEDURE — 99233 SBSQ HOSP IP/OBS HIGH 50: CPT | Performed by: INTERNAL MEDICINE

## 2020-09-23 PROCEDURE — 94618 PULMONARY STRESS TESTING: CPT

## 2020-09-23 PROCEDURE — 2060000000 HC ICU INTERMEDIATE R&B

## 2020-09-23 PROCEDURE — 83615 LACTATE (LD) (LDH) ENZYME: CPT

## 2020-09-23 PROCEDURE — 6360000002 HC RX W HCPCS: Performed by: NURSE PRACTITIONER

## 2020-09-23 PROCEDURE — 80053 COMPREHEN METABOLIC PANEL: CPT

## 2020-09-23 PROCEDURE — 6370000000 HC RX 637 (ALT 250 FOR IP): Performed by: INTERNAL MEDICINE

## 2020-09-23 PROCEDURE — 86147 CARDIOLIPIN ANTIBODY EA IG: CPT

## 2020-09-23 PROCEDURE — 82378 CARCINOEMBRYONIC ANTIGEN: CPT

## 2020-09-23 PROCEDURE — 85610 PROTHROMBIN TIME: CPT

## 2020-09-23 PROCEDURE — 81241 F5 GENE: CPT

## 2020-09-23 PROCEDURE — 85025 COMPLETE CBC W/AUTO DIFF WBC: CPT

## 2020-09-23 RX ORDER — DIPHENHYDRAMINE HCL 25 MG
25 TABLET ORAL EVERY 6 HOURS PRN
Status: DISCONTINUED | OUTPATIENT
Start: 2020-09-23 | End: 2020-09-24 | Stop reason: HOSPADM

## 2020-09-23 RX ADMIN — DIPHENHYDRAMINE HCL 25 MG: 25 TABLET ORAL at 20:48

## 2020-09-23 RX ADMIN — Medication 10 ML: at 20:49

## 2020-09-23 RX ADMIN — ENOXAPARIN SODIUM 80 MG: 80 INJECTION SUBCUTANEOUS at 08:18

## 2020-09-23 RX ADMIN — LEVOTHYROXINE SODIUM 88 MCG: 88 TABLET ORAL at 06:35

## 2020-09-23 RX ADMIN — METOPROLOL TARTRATE 25 MG: 25 TABLET, FILM COATED ORAL at 20:48

## 2020-09-23 RX ADMIN — ENOXAPARIN SODIUM 80 MG: 80 INJECTION SUBCUTANEOUS at 20:48

## 2020-09-23 RX ADMIN — Medication 10 ML: at 08:19

## 2020-09-23 RX ADMIN — METOPROLOL TARTRATE 25 MG: 25 TABLET, FILM COATED ORAL at 08:19

## 2020-09-23 RX ADMIN — ATORVASTATIN CALCIUM 20 MG: 20 TABLET, FILM COATED ORAL at 20:48

## 2020-09-23 ASSESSMENT — ENCOUNTER SYMPTOMS
SHORTNESS OF BREATH: 1
WHEEZING: 0
STRIDOR: 0
COUGH: 0
GASTROINTESTINAL NEGATIVE: 1
EYES NEGATIVE: 1
NAUSEA: 0
BLOOD IN STOOL: 0
CHEST TIGHTNESS: 0

## 2020-09-23 ASSESSMENT — PAIN DESCRIPTION - ORIENTATION: ORIENTATION: RIGHT

## 2020-09-23 ASSESSMENT — PAIN DESCRIPTION - PAIN TYPE: TYPE: ACUTE PAIN

## 2020-09-23 ASSESSMENT — PAIN SCALES - GENERAL
PAINLEVEL_OUTOF10: 2
PAINLEVEL_OUTOF10: 0

## 2020-09-23 ASSESSMENT — PAIN DESCRIPTION - FREQUENCY: FREQUENCY: CONTINUOUS

## 2020-09-23 ASSESSMENT — PAIN DESCRIPTION - DESCRIPTORS: DESCRIPTORS: ACHING

## 2020-09-23 NOTE — PROGRESS NOTES
Pt is a 67 YO female that is A+Ox4, abc's and msp's intact. Pt reported she has had swelling in RT lower extremity and rated the pain a 2/10. No redness or bruising noted to leg. Lung sounds clear bilateral. Skin warm pink and dry. Pt able to ambulate with walker and assistance. Pt denied CP, SOB, N/V, headache. Pt was able to eat approximately 25% of her lunch. Family at bedside. Pt had position of comfort. Call bell within reach.      Kim  Nursing Student

## 2020-09-23 NOTE — CONSULTS
Hematology/Oncology Consult  Encounter Date: 2020 11:03 AM    Ms. Ron Roper is a 66 y.o. female  : 1945  MRN: [id number]  [facility]  Requesting Provider: Dr Mia Roy    Reason for request: pulmonary embolism/ lung mass      CONSULTANT: Agatha Castano    HPI: Vineet Daniel was admitted for shortness of breathe. CT chest showed bilateral pulmonary embolism and a 2 cm spiculated mass in left upper lung. Smokes 3/4 pack a day. Duplex showed DVT in right leg. Denies history of thrombosis in family or recent travels or leg injury. Patient Active Problem List   Diagnosis    Acute deep vein thrombosis (DVT) of proximal vein of right lower extremity (HCC)    Essential hypertension    Hypothyroid    Mixed hyperlipidemia    Syncope and collapse    Elevated troponin    Acute pulmonary embolism (HCC)    Pulmonary embolism with acute cor pulmonale (HCC)     Past Medical History:   Diagnosis Date    Hyperlipidemia     Hypertension     Pleurisy     Thyroid disease      @PSH@  History reviewed. No pertinent family history.   Social History     Socioeconomic History    Marital status:      Spouse name: Not on file    Number of children: Not on file    Years of education: Not on file    Highest education level: Not on file   Occupational History    Not on file   Social Needs    Financial resource strain: Not on file    Food insecurity     Worry: Not on file     Inability: Not on file    Transportation needs     Medical: Not on file     Non-medical: Not on file   Tobacco Use    Smoking status: Current Every Day Smoker     Packs/day: 1.00     Types: Cigarettes    Smokeless tobacco: Never Used   Substance and Sexual Activity    Alcohol use: Not Currently    Drug use: Not Currently    Sexual activity: Not Currently   Lifestyle    Physical activity     Days per week: Not on file     Minutes per session: Not on file    Stress: Not on file   Relationships    Social connections Talks on phone: Not on file     Gets together: Not on file     Attends Faith service: Not on file     Active member of club or organization: Not on file     Attends meetings of clubs or organizations: Not on file     Relationship status: Not on file    Intimate partner violence     Fear of current or ex partner: Not on file     Emotionally abused: Not on file     Physically abused: Not on file     Forced sexual activity: Not on file   Other Topics Concern    Not on file   Social History Narrative    Not on file            Current Facility-Administered Medications   Medication Dose Route Frequency Provider Last Rate Last Dose    levothyroxine (SYNTHROID) tablet 88 mcg  88 mcg Oral Daily Gonzalo Duncan RN, NP   88 mcg at 09/23/20 0635    atorvastatin (LIPITOR) tablet 20 mg  20 mg Oral Nightly Gonzalo Duncan RN, NP   20 mg at 09/22/20 2032    metoprolol tartrate (LOPRESSOR) tablet 25 mg  25 mg Oral BID Pari Marshall MD   25 mg at 09/23/20 0819    sodium chloride flush 0.9 % injection 10 mL  10 mL Intravenous 2 times per day Gonzalo Duncan RN, NP   10 mL at 09/23/20 0819    sodium chloride flush 0.9 % injection 10 mL  10 mL Intravenous PRN Gonzalo Duncan RN, NP        acetaminophen (TYLENOL) tablet 650 mg  650 mg Oral Q6H PRN Gonzalo Duncan RN, NP        Or    acetaminophen (TYLENOL) suppository 650 mg  650 mg Rectal Q6H PRN Gonzalo Duncan RN, NP        polyethylene glycol (GLYCOLAX) packet 17 g  17 g Oral Daily PRN Gonzalo Duncan RN, NP        promethazine (PHENERGAN) tablet 12.5 mg  12.5 mg Oral Q6H PRN Gonzalo Duncan RN, NP        Or    ondansetron (ZOFRAN) injection 4 mg  4 mg Intravenous Q6H PRN Gonzalo Duncan RN, NP        enoxaparin (LOVENOX) injection 80 mg  1 mg/kg Subcutaneous BID Gonzalo Duncan RN, NP   80 mg at 09/23/20 0818     [unfilled]  Allergies   Allergen Reactions    Asa [Aspirin] Hives    Pcn [Penicillins]         Review of Systems  All other systems are normal other than ones mentioned in HPI. PHYSICAL EXAMINATION:   VITAL SIGNS: BP (!) 101/53   Pulse 57   Temp 97.7 °F (36.5 °C) (Oral)   Resp 17   Ht 5' 8\" (1.727 m)   Wt 180 lb (81.6 kg)   SpO2 98%   BMI 27.37 kg/m²         GENERAL: In no acute distress, well- nourished, well- developed,alert and oriented to person place and time. SKIN: Warm and dry, withoutjaundice, ecchymoses, or petechiae. HEENT: Normocephalic, sclera anicteric, oral mucosa moist without lesion or exudate in the visible oral cavity or oropharynx, tongue mid-line with good mobility and no deviation with extension. NODES: No palpable adenopathy in the neck Levels I-V, bilateral   Supraclavicular fossae, axillary chains, or inguinal regions. LUNGS: Good inspiratory effort, no accessory muscle use, clear bilaterally, no focal wheeze, rales or rhonchi. CARDIAC: Regular rate and rhythm, without murmurs, rubs or gallops. ABDOMINAL: Normal bowel soundspresent, soft, non-tender, no mass or  organomegaly.   [facility] [place]    LAB RESULTS:  Recent Results (from the past 24 hour(s))   Comprehensive Metabolic Panel w/ Reflex to MG    Collection Time: 09/23/20  5:46 AM   Result Value Ref Range    Sodium 136 135 - 144 mEq/L    Potassium reflex Magnesium 4.3 3.4 - 4.9 mEq/L    Chloride 102 95 - 107 mEq/L    CO2 25 20 - 31 mEq/L    Anion Gap 9 9 - 15 mEq/L    Glucose 101 (H) 70 - 99 mg/dL    BUN 12 8 - 23 mg/dL    CREATININE 0.52 0.50 - 0.90 mg/dL    GFR Non-African American >60.0 >60    GFR  >60.0 >60    Calcium 8.7 8.5 - 9.9 mg/dL    Total Protein 5.8 (L) 6.3 - 8.0 g/dL    Alb 3.3 (L) 3.5 - 4.6 g/dL    Total Bilirubin 0.4 0.2 - 0.7 mg/dL    Alkaline Phosphatase 50 40 - 130 U/L    ALT 10 0 - 33 U/L    AST 17 0 - 35 U/L    Globulin 2.5 2.3 - 3.5 g/dL   CBC auto differential    Collection Time: 09/23/20  5:46 AM   Result Value Ref Range    WBC 6.8 4.8 - 10.8 K/uL    RBC 3.97 (L) 4.20 - 5.40 M/uL    Hemoglobin 13.1 12.0 - 16.0 g/dL    Hematocrit 38.5 37.0 - 47.0 %    MCV 96.9 82.0 - 100.0 fL    MCH 32.9 (H) 27.0 - 31.3 pg    MCHC 34.0 33.0 - 37.0 %    RDW 12.8 11.5 - 14.5 %    Platelets 898 256 - 234 K/uL    Neutrophils % 70.4 %    Lymphocytes % 16.3 %    Monocytes % 11.9 %    Eosinophils % 1.3 %    Basophils % 0.1 %    Neutrophils Absolute 4.8 1.4 - 6.5 K/uL    Lymphocytes Absolute 1.1 1.0 - 4.8 K/uL    Monocytes Absolute 0.8 0.2 - 0.8 K/uL    Eosinophils Absolute 0.1 0.0 - 0.7 K/uL    Basophils Absolute 0.0 0.0 - 0.2 K/uL     Recent Labs     09/23/20  0546   GLUCOSE 101*        Pathology:     RADIOLOGY RESULTS:  Ct Head Wo Contrast    Result Date: 9/22/2020  CT Brain Contrast medium:  Not utilized. History:  fall Comparison: Findings: There are no extra-axial collections. There is no evidence of hemorrhage. .  There is mild prominence of the and ventricles indicating chronic involutional changes. .  The basilar cisterns are patent. .  There are no focal lesions or areas of increased or decreased attenuation. .  There is no midline shift. .  The posterior fossa and craniocervical junction are within normal limits. .  The visualized portions of the paranasal sinuses and mastoid air cells are unremarkable. .  The visualized portions of the orbits are within normal limits. The bones are intact. . Incidentally noted there are heavy calcifications of the bilateral palatine tonsils, of uncertain clinical significance. Impression: THERE ARE NO ACUTE INTRACRANIAL CHANGES. THERE IS MILD PROMINENCE OF SULCI AND VENTRICLES WITH PERIVENTRICULAR WHITE MATTER HYPOINTENSITIES WHICH ARE USUALLY ASSOCIATED WITH CHRONIC MICROANGIOPATHY. POSSIBLE EXACERBATING CLINICAL FACTORS INCLUDE DIABETES, HYPERTENSION, HYPERLIPIDEMIA, TOBACCO ABUSE OR OTHER ETIOLOGIES.  All CT scans at this facility use dose modulation, iterative reconstruction, and/or weight based dosing when appropriate to reduce radiation dose to as low as reasonably achievable. Cta Chest W Wo Contrast    Result Date: 9/22/2020  EXAM: CTA CHEST W WO CONTRAST History: Positive DVT. Leg swelling. Technique: Multiple contiguous axial images of the chest were obtained from the thoracic inlet through the upper abdomen with contrast. Multiplanar reformats including maximum intensity projection images were obtained. Comparison: None available Findings: Visualized portion of the thyroid gland is within normal limits. No axillary, mediastinal, or hilar lymphadenopathy. Filling defects are identified within the distal right main pulmonary artery and right segmental/subsegmental branches supplying the right lower lobe and right middle lobe. Filling defects are identified within the distal left pulmonary artery and segmental/subsegmental branches supplying the left upper lobe and left lower lobe. Mild leftward bowing of the interventricular septum. Heart size is within normal limits. No significant pericardial effusion. Esophagus is within normal limits. Spiculated 2 cm left upper lobe pulmonary nodule as seen on axial series 3 image 30. 6 cm right upper lobe pulmonary nodule as seen on axial series 3 image 20. 8mm right middle lobe pulmonary nodule as seen on axial series 3 image 55. No consolidation, pneumothorax, pleural effusion. Degenerative changes of the spine and left shoulder. Visualized upper abdomen demonstrates a 2 cm left renal cyst and multiple tiny gallstones. Bilateral pulmonary emboli. Mild right heart strain. Spiculated 2 cm left upper lobe pulmonary nodule is concerning for malignancy. Additional 6 mm right upper lobe pulmonary nodule and 8 mm right middle lobe pulmonary nodule. Cholelithiasis. All CT scans at this facility use dose modulation, iterative reconstruction, and/or weight based dosing when appropriate to reduce radiation dose to as low as reasonably achievable.      Us Dup Lower Extremity Right Mariano    Result Date: 9/23/2020  EXAMINATION: US DUP LOWER EXTREMITY RIGHT MILADYS DATE AND TIME:9/21/2020 9:10 PM CLINICAL HISTORY: LEG PAIN. LEG SWELLING. LEG SWELLING AND WARM TO TOUCH       COMPARISON:NONE TECHNIQUE: The lower extremity veins were evaluated with color doppler, gray scale imaging and spectral analysis while using compression and augmentation when possible. FINDINGS: Evaluation of the right lower extremity from the thigh to the knee shows occlusive thrombus in the right femoral vein and popliteal vein and probably in the right calf veins. Thrombotic material in the proximal right femoral vein appears mobile. POSITIVE FOR ACUTE DEEP VENOUS THROMBOSIS IN THE RIGHT LOWER EXTREMITY. Us Carotid Artery Bilateral    Result Date: 9/22/2020  EXAMINATION:  CAROTID DUPLEX ULTRASONOGRAPHY CLINICAL HISTORY:  SYNCOPE  COMPARISONS:  None TECHNIQUE:  B-mode, color flow and spectral Doppler FINDINGS:  ARTERIAL BLOOD FLOW VELOCITY RIGHT PS                                               Prox CCA    75 cm/s             Mid CCA     74 cm/s              Dist CCA    74 cm/s              Prox ICA    58 cm/s               Mid ICA     92 cm/s            Dist ICA    74 cm/s             Prox ECA    65 cm/s             Prox VERT   49 cm/s              ICA/CCA     1.25                        LEFT PS Prox CCA    76 cm/s Mid CCA     79 cm/s Dist CCA    67 cm/s Prox ICA    53 cm/s Mid ICA     84 cm/s Dist ICA    48 cm/s Prox ECA    61 cm/s Prox VERT   50 cm/s ICA/CCA     1.07     MINIMAL ATHEROSCLEROSIS AND INTIMAL THICKENING INVOLVING BOTH ICAS. BILATERAL ICA LESS THAN 50% STENOSIS. BILATERAL ANTEGRADE VERTEBRAL FLOW. .     ASSESSMENT AND PLAN  1. Right leg DVT and bilateral pulmonary embolism. On anticoagulation. 2. 2 cm spiculated mass in left upper lung. Check CEA. CT scan of abdomen/pelvis.     Because of extensive PE told patient that I agree with Dr Master Recinos regarding delaying biopsy of lung mass for a month until pulmonary embolism is stable.      Electronically signed by Soco Ervin MD on 9/23/2020 at 11:03 AM

## 2020-09-23 NOTE — PROGRESS NOTES
Progress Note  Patient: Sunita Veliz  Unit/Bed: D884/A460-87  YOB: 1945  MRN: [id number]  Acct: [de-identified]   Admitting Diagnosis: Syncope and collapse [R55]  Pulmonary embolism with acute cor pulmonale, unspecified chronicity, unspecified pulmonary embolism type ([facility] [address] [I26.09]  Admit Date:  9/21/2020  Hospital Day: 1    Chief Complaint: PE    Histories:  Past Medical History:   Diagnosis Date    Hyperlipidemia     Hypertension     Pleurisy     Thyroid disease      Past Surgical History:   Procedure Laterality Date    JOINT REPLACEMENT      Right hip     History reviewed. No pertinent family history. Social History     Socioeconomic History    Marital status:      Spouse name: None    Number of children: None    Years of education: None    Highest education level: None   Occupational History    None   Social Needs    Financial resource strain: None    Food insecurity     Worry: None     Inability: None    Transportation needs     Medical: None     Non-medical: None   Tobacco Use    Smoking status: Current Every Day Smoker     Packs/day: 1.00     Types: Cigarettes    Smokeless tobacco: Never Used   Substance and Sexual Activity    Alcohol use: Not Currently    Drug use: Not Currently    Sexual activity: Not Currently   Lifestyle    Physical activity     Days per week: None     Minutes per session: None    Stress: None   Relationships    Social connections     Talks on phone: None     Gets together: None     Attends Sabianist service: None     Active member of club or organization: None     Attends meetings of clubs or organizations: None     Relationship status: None    Intimate partner violence     Fear of current or ex partner: None     Emotionally abused: None     Physically abused: None     Forced sexual activity: None   Other Topics Concern    None   Social History Narrative    None       Subjective/HPI feels better less SOB no Cp eats well.  Daughter in room    EKG: SR         Review of Systems:   Review of Systems   Constitutional: Negative. Negative for diaphoresis and fatigue. HENT: Negative. Eyes: Negative. Respiratory: Positive for shortness of breath. Negative for cough, chest tightness, wheezing and stridor. Cardiovascular: Positive for leg swelling. Negative for chest pain and palpitations. Gastrointestinal: Negative. Negative for blood in stool and nausea. Genitourinary: Negative. Musculoskeletal: Negative. Skin: Negative. Neurological: Negative. Negative for dizziness, syncope, weakness and light-headedness. Hematological: Negative. Psychiatric/Behavioral: Negative. Physical Examination:    BP (!) 101/53   Pulse 57   Temp 97.7 °F (36.5 °C) (Oral)   Resp 17   Ht 5' 8\" (1.727 m)   Wt 180 lb (81.6 kg)   SpO2 98%   BMI 27.37 kg/m²    Physical Exam   Constitutional: She appears healthy. No distress. HENT:   Normal cephalic and Atraumatic   Eyes: Pupils are equal, round, and reactive to light. Neck: Normal range of motion and thyroid normal. Neck supple. No JVD present. No neck adenopathy. No thyromegaly present. Cardiovascular: Normal rate, regular rhythm, intact distal pulses and normal pulses. Murmur heard. Pulmonary/Chest: Effort normal and breath sounds normal. She has no wheezes. She has no rales. She exhibits no tenderness. Abdominal: Soft. Bowel sounds are normal. There is no abdominal tenderness. Musculoskeletal: Normal range of motion. General: No tenderness or edema. Neurological: She is alert and oriented to person, place, and time. Skin: Skin is warm. No cyanosis. Nails show no clubbing.        LABS:  CBC:   Lab Results   Component Value Date    WBC 6.8 09/23/2020    RBC 3.97 09/23/2020    RBC 4.41 04/13/2012    HGB 13.1 09/23/2020    HCT 38.5 09/23/2020    MCV 96.9 09/23/2020    MCH 32.9 09/23/2020    MCHC 34.0 09/23/2020    RDW 12.8 09/23/2020     09/23/2020    MPV 7.7 04/09/2015     CBC with Differential:    Lab Results   Component Value Date    WBC 6.8 09/23/2020    RBC 3.97 09/23/2020    RBC 4.41 04/13/2012    HGB 13.1 09/23/2020    HCT 38.5 09/23/2020     09/23/2020    MCV 96.9 09/23/2020    MCH 32.9 09/23/2020    MCHC 34.0 09/23/2020    RDW 12.8 09/23/2020    LYMPHOPCT 16.3 09/23/2020    MONOPCT 11.9 09/23/2020    EOSPCT 2.0 04/13/2012    BASOPCT 0.1 09/23/2020    MONOSABS 0.8 09/23/2020    LYMPHSABS 1.1 09/23/2020    EOSABS 0.1 09/23/2020    BASOSABS 0.0 09/23/2020     CMP:    Lab Results   Component Value Date     09/23/2020    K 4.3 09/23/2020     09/23/2020    CO2 25 09/23/2020    BUN 12 09/23/2020    CREATININE 0.52 09/23/2020    GFRAA >60.0 09/23/2020    LABGLOM >60.0 09/23/2020    GLUCOSE 101 09/23/2020    GLUCOSE 96 04/13/2012    PROT 5.8 09/23/2020    LABALBU 3.3 09/23/2020    LABALBU 4.3 04/13/2012    CALCIUM 8.7 09/23/2020    BILITOT 0.4 09/23/2020    ALKPHOS 50 09/23/2020    AST 17 09/23/2020    ALT 10 09/23/2020     BMP:    Lab Results   Component Value Date     09/23/2020    K 4.3 09/23/2020     09/23/2020    CO2 25 09/23/2020    BUN 12 09/23/2020    LABALBU 3.3 09/23/2020    LABALBU 4.3 04/13/2012    CREATININE 0.52 09/23/2020    CALCIUM 8.7 09/23/2020    GFRAA >60.0 09/23/2020    LABGLOM >60.0 09/23/2020    GLUCOSE 101 09/23/2020    GLUCOSE 96 04/13/2012     Magnesium:    Lab Results   Component Value Date    MG 1.7 09/21/2020     Troponin:    Lab Results   Component Value Date    TROPONINI <0.010 09/22/2020        Active Hospital Problems    Diagnosis Date Noted    Acute pulmonary embolism ([facility] [I26.99] 09/22/2020     Priority: Low    Pulmonary embolism with acute cor pulmonale (HCC) [I26.09] 09/22/2020     Priority: Low    Acute deep vein thrombosis (DVT) of proximal vein of right lower extremity ([facility] [I82.4Y1] 09/21/2020     Priority: Low    Essential hypertension [I10] 09/21/2020     Priority: Low    Hypothyroid [E03.9] 2020     Priority: Low    Mixed hyperlipidemia [E78.2] 2020     Priority: Low    Syncope and collapse [R55] 2020     Priority: Low    Elevated troponin [R79.89] 2020     Priority: Low        Assessment/Plan:  Charlie Johnson MD    Physician    Cardiology    Consults    Signed    Date of Service:  2020 10:00 AM             Consult Orders    Inpatient consult to Cardiology [[id number]] ordered by Morgan Hale, RN, NP            Signed         Expand All Collapse All      Show:Clear all  [x]Manual[x]Template[]Copied    Added by:  [x]Nish Linn MD    []Hover for details  Consults     Patient Name: Jose Raul Castrejon Date: 2020  7:12 PM  MR #: [id number]  : 1945     Attending Physician: Mallory Shafer MD  Reason for consult: PE     History of Presenting Illness:       Ron Roper is a 66 y.o. female on hospital day 0 with a history of . History Obtained From:  patient, electronic medical record     Admitted with SOB weakness and loss of appetite. RLE edema noted for several days. -RLE DVT and B/L PE. Unfortunately discovered ALE Lung mass 2cm meng[icios for cancer. In recent days she has had cople episodes of syncope. Trops detected. CT chest shows RV strain.      Denies CP. Feels some better. No prior CAD PAD nor CVA     Continued smoker  _+HF heart  History:       EKG:SR  Past Medical History        Past Medical History:   Diagnosis Date    Hyperlipidemia      Hypertension      Pleurisy      Thyroid disease          Past Surgical History         Past Surgical History:   Procedure Laterality Date    JOINT REPLACEMENT         Right hip           Family History  Family History   History reviewed. No pertinent family history. []?  Unable to obtain due to ventilated and/ or neurologic status     Social History               Socioeconomic History    Marital status:        Spouse name: Not on file    Number of children: Not on file    motion. General: Edema (RLE 2+) present. No tenderness. Neurological: She is alert and oriented to person, place, and time. Skin: Skin is warm. No cyanosis. Nails show no clubbing.          Results/ Medications reviewed 9/22/2020, 10:00 AM      Laboratory, Microbiology, Pathology, Radiology, Cardiology, Medications and Transcriptions reviewed  Scheduled Meds:  Scheduled Medications    levothyroxine  88 mcg Oral Daily    atorvastatin  20 mg Oral Nightly    aspirin  81 mg Oral Daily    metoprolol tartrate  25 mg Oral BID    sodium chloride flush  10 mL Intravenous 2 times per day    enoxaparin  1 mg/kg Subcutaneous BID        Continuous Infusions:  Infusions Meds                Recent Labs     09/21/20 1945 09/22/20  0740   WBC 8.4 8.1   HGB 13.6 14.3   HCT 39.3 41.4   MCV 96.0 97.0    226          Recent Labs     09/21/20 1945 09/22/20  0740    140   K 4.1 4.2    105   CO2 23 22   BUN 16 13   CREATININE 0.55 0.48*          Recent Labs     09/21/20 1945 09/22/20  0740   AST 20 17   ALT 9 10   BILITOT <0.2 0.4   ALKPHOS 55 55     No results for input(s): LIPASE, AMYLASE in the last 72 hours. Recent Labs     09/21/20 1945 09/22/20  0740   PROT 6.1* 6.4     Ct Head Wo Contrast     Result Date: 9/22/2020  CT Brain Contrast medium:  Not utilized. History:  fall Comparison: Findings: There are no extra-axial collections. There is no evidence of hemorrhage. .  There is mild prominence of the and ventricles indicating chronic involutional changes. .  The basilar cisterns are patent. .  There are no focal lesions or areas of increased or decreased attenuation. .  There is no midline shift. .  The posterior fossa and craniocervical junction are within normal limits. .  The visualized portions of the paranasal sinuses and mastoid air cells are unremarkable. .  The visualized portions of the orbits are within normal limits. The bones are intact. . Incidentally noted there are heavy calcifications of the bilateral palatine tonsils, of uncertain clinical significance.      Impression: THERE ARE NO ACUTE INTRACRANIAL CHANGES. THERE IS MILD PROMINENCE OF SULCI AND VENTRICLES WITH PERIVENTRICULAR WHITE MATTER HYPOINTENSITIES WHICH ARE USUALLY ASSOCIATED WITH CHRONIC MICROANGIOPATHY. POSSIBLE EXACERBATING CLINICAL FACTORS INCLUDE DIABETES, HYPERTENSION, HYPERLIPIDEMIA, TOBACCO ABUSE OR OTHER ETIOLOGIES. All CT scans at this facility use dose modulation, iterative reconstruction, and/or weight based dosing when appropriate to reduce radiation dose to as low as reasonably achievable.               Active Hospital Problems     Diagnosis Date Noted    Acute pulmonary embolism ([address] [I26.99] 09/22/2020       Priority: Low    Acute deep vein thrombosis (DVT) of proximal vein of right lower extremity ([address] [I82.4Y1] 09/21/2020       Priority: Low    Essential hypertension [I10] 09/21/2020       Priority: Low    Hypothyroid [E03.9] 09/21/2020       Priority: Low    Mixed hyperlipidemia [E78.2] 09/21/2020       Priority: Low    Syncope and collapse [R55] 09/21/2020       Priority: Low    Elevated troponin [R79.89] 09/21/2020       Priority: Low         Impression/Plan: 1. B/L PE from RLE- on Lovenox. Stable. Feels better. Change to NOAC prior to dc. Will ambulate in moser and monitor Vitals- if there are deterioration such as hypoxia hypotension or tachycardia- pt may benefit from thrombectomy. Discussed with Baltazar Delatorre and Jennifer Lima. 2. Echo EF 65% with RV strain with dysfunction. 3. HTN Stable  4. Add ASA and BB. Continue Statin.    5. Stop smoking                        Electronically signed by Shandra Hernandez MD on 9/23/2020 at 10:02 AM

## 2020-09-23 NOTE — CONSULTS
Pulmonary Medicine  Consult Note      Reason for consultation: Pulmonary embolism and lung nodule    HISTORY OF PRESENT ILLNESS:      This is 20-year-old female with past medical history significant for hypothyroidism, hypertension, hyperlipidemia, tobacco abuse was presented with right lower extremity edema noticed since last Thursday which gradually worsened. She also mentions that before coming to hospitals she has syncopal episode at home. She has no complaint of chest pain but according to daughter she has not been feeling well since last week feeling very tired and has complained of some shortness of breath. She also has some cough. She has poor appetite in past 1 week and has been losing weight. She has no history of cancer or no history of blood clot in the past.  She smoked about a pack a day for total of more than 30 years. She had CT chest done which shows bilateral pulmonary emboli with mild right heart strain and spiculated 2 cm left upper lobe pulmonary nodule concerning for malignancy additional 6 mm right upper lobe pulmonary nodule and 8 mm right middle lobe pulmonary nodule. She has DVT study done report is pending  She denies any hemoptysis. No nausea vomiting diarrhea or abdominal pain. Past Medical History:        Diagnosis Date    Hyperlipidemia     Hypertension     Pleurisy     Thyroid disease        Past Surgical History:        Procedure Laterality Date    JOINT REPLACEMENT      Right hip       Social History:     reports that she has been smoking cigarettes. She has been smoking about 1.00 pack per day. She has never used smokeless tobacco. She reports previous alcohol use. She reports previous drug use. Family History:   History reviewed. No pertinent family history.     Allergies:  Asa [aspirin] and Pcn [penicillins]        MEDICATIONS during current hospitalization:    Continuous Infusions:    Scheduled Meds:   levothyroxine  88 mcg Oral Daily    atorvastatin  20 mg Oral Nightly    metoprolol tartrate  25 mg Oral BID    sodium chloride flush  10 mL Intravenous 2 times per day    enoxaparin  1 mg/kg Subcutaneous BID       PRN Meds:sodium chloride flush, acetaminophen **OR** acetaminophen, polyethylene glycol, promethazine **OR** ondansetron    REVIEW OF SYSTEMS:  As in history of present illness  Other 14 point review of system is negative. PHYSICAL EXAM:    Vitals:  BP (!) 100/53   Pulse 64   Temp 98.1 °F (36.7 °C) (Oral)   Resp 17   Ht 5' 8\" (1.727 m)   Wt 180 lb (81.6 kg)   SpO2 94%   BMI 27.37 kg/m²   General: Alert, awake, Oriented x3  .comfortable in bed, No distress. Head: Atraumatic , Normocephalic   Eyes: PERRL. No sclera icterus. No conjunctival injection. No discharge   ENT: No nasal  discharge. Pharynx clear. Neck:  Trachea midline. No thyromegaly, no JVD, No cervical adenopathy. Chest : Bilaterally symmetrical ,Normal effort,  No accessory muscle use  Lung : Diminished BS bilateraly. No Rales. No wheezing. No rhonchi. Heart[de-identified] Normal  rate. Regular rhythm. No mumur ,  Rub or gallop  ABD: Non-tender. Non-distended. No masses. No organmegaly. Normal bowel sounds. No hernia. Musculoskeleton: normal range of motion in all extremites, strength and tone   Extremities: Swelling of the right lower extremity, No Cyanosis ,No clubbing  Neuro: no cranial nerve abnormality, normal reflex and sensation, no focal weakness   Skin: Warm and dry. No erythema rash on exposed extremities. Data Review  Recent Labs     09/21/20 1945 09/22/20  0740   WBC 8.4 8.1   HGB 13.6 14.3   HCT 39.3 41.4    226      Recent Labs     09/21/20 1945 09/22/20  0740    140   K 4.1 4.2    105   CO2 23 22   BUN 16 13   CREATININE 0.55 0.48*   GLUCOSE 99 102*       MV Settings: ABGs: No results for input(s): PHART, AUF7WAX, PO2ART, PMF4ALQ, BEART, G1QHLARB, FNC6BKT in the last 72 hours.   O2 Device: None (Room air)  No results found for: [facility]    Radiology  Ct Head Wo Contrast    Result Date: 9/22/2020  CT Brain Contrast medium:  Not utilized. History:  fall Comparison: Findings: There are no extra-axial collections. There is no evidence of hemorrhage. .  There is mild prominence of the and ventricles indicating chronic involutional changes. .  The basilar cisterns are patent. .  There are no focal lesions or areas of increased or decreased attenuation. .  There is no midline shift. .  The posterior fossa and craniocervical junction are within normal limits. .  The visualized portions of the paranasal sinuses and mastoid air cells are unremarkable. .  The visualized portions of the orbits are within normal limits. The bones are intact. . Incidentally noted there are heavy calcifications of the bilateral palatine tonsils, of uncertain clinical significance. Impression: THERE ARE NO ACUTE INTRACRANIAL CHANGES. THERE IS MILD PROMINENCE OF SULCI AND VENTRICLES WITH PERIVENTRICULAR WHITE MATTER HYPOINTENSITIES WHICH ARE USUALLY ASSOCIATED WITH CHRONIC MICROANGIOPATHY. POSSIBLE EXACERBATING CLINICAL FACTORS INCLUDE DIABETES, HYPERTENSION, HYPERLIPIDEMIA, TOBACCO ABUSE OR OTHER ETIOLOGIES. All CT scans at this facility use dose modulation, iterative reconstruction, and/or weight based dosing when appropriate to reduce radiation dose to as low as reasonably achievable. Cta Chest W Wo Contrast    Result Date: 9/22/2020  EXAM: CTA CHEST W WO CONTRAST History: Positive DVT. Leg swelling. Technique: Multiple contiguous axial images of the chest were obtained from the thoracic inlet through the upper abdomen with contrast. Multiplanar reformats including maximum intensity projection images were obtained. Comparison: None available Findings: Visualized portion of the thyroid gland is within normal limits. No axillary, mediastinal, or hilar lymphadenopathy.  Filling defects are identified within the distal right main pulmonary artery and right segmental/subsegmental branches supplying the right lower lobe and right middle lobe. Filling defects are identified within the distal left pulmonary artery and segmental/subsegmental branches supplying the left upper lobe and left lower lobe. Mild leftward bowing of the interventricular septum. Heart size is within normal limits. No significant pericardial effusion. Esophagus is within normal limits. Spiculated 2 cm left upper lobe pulmonary nodule as seen on axial series 3 image 30. 6 cm right upper lobe pulmonary nodule as seen on axial series 3 image 20. 8mm right middle lobe pulmonary nodule as seen on axial series 3 image 55. No consolidation, pneumothorax, pleural effusion. Degenerative changes of the spine and left shoulder. Visualized upper abdomen demonstrates a 2 cm left renal cyst and multiple tiny gallstones. Bilateral pulmonary emboli. Mild right heart strain. Spiculated 2 cm left upper lobe pulmonary nodule is concerning for malignancy. Additional 6 mm right upper lobe pulmonary nodule and 8 mm right middle lobe pulmonary nodule. Cholelithiasis. All CT scans at this facility use dose modulation, iterative reconstruction, and/or weight based dosing when appropriate to reduce radiation dose to as low as reasonably achievable.      Us Carotid Artery Bilateral    Result Date: 9/22/2020  EXAMINATION:  CAROTID DUPLEX ULTRASONOGRAPHY CLINICAL HISTORY:  SYNCOPE  COMPARISONS:  None TECHNIQUE:  B-mode, color flow and spectral Doppler FINDINGS:  ARTERIAL BLOOD FLOW VELOCITY RIGHT PS                                               Prox CCA    75 cm/s             Mid CCA     74 cm/s              Dist CCA    74 cm/s              Prox ICA    58 cm/s               Mid ICA     92 cm/s            Dist ICA    74 cm/s             Prox ECA    65 cm/s             Prox VERT   49 cm/s              ICA/CCA     1.25                        LEFT PS Prox CCA    76 cm/s Mid CCA     79 cm/s Dist CCA    67 cm/s Prox ICA    53 cm/s Mid ICA     84 cm/s Dist ICA    48 cm/s Prox ECA    61 cm/s Prox VERT   50 cm/s ICA/CCA     1.07     MINIMAL ATHEROSCLEROSIS AND INTIMAL THICKENING INVOLVING BOTH ICAS. BILATERAL ICA LESS THAN 50% STENOSIS. BILATERAL ANTEGRADE VERTEBRAL FLOW. Assessment and  plan:     1. Acute pulmonary embolism, bilateral central, segmental and subsegmental PE with mild right ventricular strain  2. Right lower extremity DVT  3. 2 cm left upper lung spiculated pulmonary nodule rule out malignancy  4. Tobacco abuse  5. Hypertension  6. Hyperlipidemia  7. Hypothyroidism    Patient is currently on Lovenox 1 mg/kg subcutaneous 12 hourly. 2D echo showing moderate pulmonary hypertension right ventricular global hypokinesis and RV pressure volume overload. She is hemodynamically stable with room air O2 saturation 94%. CT chest also shows spiculated nodule in left upper lobe and considering history of tobacco abuse and recent weight loss underlying malignancy need to rule out. PET scan and bronchoscopy or CT-guided biopsy in future. recommends to treat PE at this time with anticoagulation therapy. Will consult Dr. Dannielle Cardona hemato-oncologist in a.m.   Thank you for consult    Electronically signed by Carolann Griffith MD, [facility] on 9/22/2020 at 8:12 PM

## 2020-09-23 NOTE — DISCHARGE SUMMARY
Physician Discharge Summary     Patient ID:  Jack Harmon  [id number]  66 y.o.  1945    Admit date: 9/21/2020    Discharge date : 09/23/20     Admitting Physician: Gregg Roblero MD     Discharge Physician: Paul Parks MD     Admission Diagnoses: Syncope and collapse [R55]  Pulmonary embolism with acute cor pulmonale, unspecified chronicity, unspecified pulmonary embolism type ([facility] [address] [I26.09]    Discharge Diagnoses: Acute DVT RLE, Bilateral pulmonary embolism, ALE 2cm lung mass    Admission Condition: fair    Discharged Condition: good    Hospital Course:   1. Acute pulmonary embolism with cor pulmonale- patient denies shortness breath or chest pain.  However, she had syncopal episode before coming to the hospital. Katlin Negro also was found to have DVT.  CTA showed bilateral central, segmental and subsegmental PE with mild right heart strain.  Patient was also found to have elevated troponin. [facility] consult cardiology. [facility] order echo to assess the cardiac strain.  Continue patient on Lovenox therapeutic dose.  Pulmonology consulted. 1. 9/22 - cont lovenox 1mg/kg bid, echo pending for suspected strain however patient clinically in no distress, cardiology and pulmonology following  2. Acute deep vein thrombosis on the right-right lower extremity edema and erythema and warmth.  Ultrasound showed occlusive thrombus in the right superficial femoral vein, occlusive thrombus in the popliteal vein, possible thrombus in the right calf vein.  Continue Lovenox for now.  Patient also has bilateral pulmonary embolism with right heart strain.  Patient might require IVC filter.  Will follow cardiology and pulmonology recommendation. 3. Lung mass-suspicious for malignancy 2 cm in size in the left upper lobe.  History of tobacco abuse and gradual unintentional weight loss. [facility] consult pulmonology for assessment and possible biopsy.   1. 9/22 - await pulm recs, probable need for oncology evaluation; ct abd pelv pending at this time    Consults: cardiology, pulmonary/intensive care and hematology/oncology    Significant Diagnostic Studies: as below    Discharge Exam:  BP (!) 101/53   Pulse 57   Temp 97.7 °F (36.5 °C) (Oral)   Resp 17   Ht 5' 8\" (1.727 m)   Wt 180 lb (81.6 kg)   SpO2 98%   BMI 27.37 kg/m²   General appearance: alert, appears stated age and cooperative  Lungs: clear to auscultation bilaterally  Heart: regular rate and rhythm, S1, S2 normal, no murmur, click, rub or gallop  Abdomen: soft, non-tender; bowel sounds normal; no masses,  no organomegaly  Extremities: extremities normal, atraumatic, no cyanosis or edema  Skin: Skin color, texture, turgor normal. No rashes or lesions    Labs:   Recent Labs     09/21/20 1945 09/22/20  0740 09/23/20  0546   WBC 8.4 8.1 6.8   HGB 13.6 14.3 13.1   HCT 39.3 41.4 38.5    226 237     Recent Labs     09/21/20 1945 09/22/20  0740 09/23/20  0546    140 136   K 4.1 4.2 4.3    105 102   CO2 23 22 25   BUN 16 13 12   CREATININE 0.55 0.48* 0.52   CALCIUM 8.8 9.0 8.7     Recent Labs     09/21/20 1945 09/22/20  0740 09/23/20  0546   AST 20 17 17   ALT 9 10 10   BILITOT <0.2 0.4 0.4   ALKPHOS 55 55 50     No results for input(s): INR in the last 72 hours. Recent Labs     09/21/20 1945 09/22/20 0138 09/22/20  0740   TROPONINI 0.019* 0.015* <0.010       Urinalysis:   No results found for: Lorena Solum, BACTERIA, RBCUA, BLOODU, SPECGRAV, GLUCOSEU    Radiology:   Most recent    Chest CT      WITH CONTRAST:No results found for this or any previous visit. WITHOUT CONTRAST: No results found for this or any previous visit. CXR      2-view: No results found for this or any previous visit. Portable: No results found for this or any previous visit. Echo No results found for this or any previous visit.     Disposition: home    In process/preliminary results:  Outstanding Order Results     Date and Time Order Name Status Description    9/23/2020 1152 Lactate Dehydrogenase In process     9/21/2020 [facility] DUP LOWER EXTREMITY RIGHT MILADYS Preliminary           Patient Instructions:   Current Discharge Medication List      START taking these medications    Details   metoprolol tartrate (LOPRESSOR) 25 MG tablet Take 1 tablet by mouth 2 times daily  Qty: 60 tablet, Refills: 3      !! apixaban (ELIQUIS DVT/PE STARTER PACK) 5 MG TABS tablet Take 10 mg (2 tablets) orally twice daily for 7 days, then take 5 mg (1 tablet) orally twice daily thereafter. Qty: 74 tablet, Refills: 0      !! apixaban (ELIQUIS) 5 MG TABS tablet Take 1 tablet by mouth 2 times daily  Qty: 60 tablet, Refills: 5       !! - Potential duplicate medications found. Please discuss with provider. CONTINUE these medications which have NOT CHANGED    Details   simvastatin (ZOCOR) 40 MG tablet Take 40 mg by mouth nightly      levothyroxine (SYNTHROID) 88 MCG tablet Take 88 mcg by mouth Daily         STOP taking these medications       lisinopril-hydroCHLOROthiazide (PRINZIDE;ZESTORETIC) 20-25 MG per tablet Comments:   Reason for Stopping:             Activity: activity as tolerated  Diet: regular diet  Wound Care: none needed    Follow-up with PCP in 1 week, oncology within 1 month for biopsy and plan, [address] for cardiology followup.     DC time 35 minutes    Signed:  Electronically signed by Barbie Phalen, MD on 9/23/2020 at 12:08 PM

## 2020-09-23 NOTE — FLOWSHEET NOTE
2000- Assumed care of pt who is lying comfortably in bed in no apparent distress. Pt's daughter is at bedside. Pt and her daughter are both updated regarding plan of care. All questions answered. 2030- Pt assessed. See flow sheet for detail. Currently denies complaints of pain or discomfort. Will continue to monitor  0000- Pt resting with eyes closed. No signs of discomfort or distress noted. 0100- Assisted pt to bathroom with the additional use of a walker. Pt tolerated well. No shortness of breath noted and continues to deny complaints of pain or discomfort. 0400- Pt resting with eyes closed. No signs of distress or discomfort noted. 0530- Continues resting with eyes closed. No signs of distress or discomfort noted.

## 2020-09-23 NOTE — PROGRESS NOTES
Hospitalist Daily Progress Note  Name: Argenis Mike  Age: 66 y.o. Gender: female  CodeStatus: Full Code  Allergies: Asa [Aspirin]  Pcn [Penicillins]    Chief Complaint:Other (increased weakness, leg swelling in right leg \"not feeling well since friday/sat\" decreased appetite increased fatigue)      Primary Care Provider: Sylwia Collins DO    InpatientTreatment Team: Treatment Team: Attending Provider: Julian Dumont MD; Consulting Physician: Chasity Piedra MD; Utilization Reviewer: Gentry Schilder, RN; Consulting Physician: Bashir Sanders MD; Registered Nurse: Lisy Abdi RN; : Rubi Bonner RN    Admission Date: 9/21/2020      Subjective: No chest pain. SOB improved. Daughter at bedside. Physical Exam  Vitals signs and nursing note reviewed. Constitutional:       Appearance: Normal appearance. Cardiovascular:      Rate and Rhythm: Normal rate and regular rhythm. Pulmonary:      Effort: Pulmonary effort is normal.      Breath sounds: Normal breath sounds. Abdominal:      General: Bowel sounds are normal.      Palpations: Abdomen is soft. Musculoskeletal: Normal range of motion. Skin:     General: Skin is warm and dry. Neurological:      Mental Status: She is alert and oriented to person, place, and time. Mental status is at baseline.          Medications:  Reviewed    Infusion Medications:   Scheduled Medications:    levothyroxine  88 mcg Oral Daily    atorvastatin  20 mg Oral Nightly    metoprolol tartrate  25 mg Oral BID    sodium chloride flush  10 mL Intravenous 2 times per day    enoxaparin  1 mg/kg Subcutaneous BID     PRN Meds: sodium chloride flush, acetaminophen **OR** acetaminophen, polyethylene glycol, promethazine **OR** ondansetron    Labs:   Recent Labs     09/21/20 1945 09/22/20 0740 09/23/20  0546   WBC 8.4 8.1 6.8   HGB 13.6 14.3 13.1   HCT 39.3 41.4 38.5    226 237     Recent Labs     09/21/20 1945 09/22/20 0740 09/23/20  0546    140 136   K 4.1 4.2 4.3    105 102   CO2 23 22 25   BUN 16 13 12   CREATININE 0.55 0.48* 0.52   CALCIUM 8.8 9.0 8.7     Recent Labs     09/21/20 1945 09/22/20  0740 09/23/20  0546   AST 20 17 17   ALT 9 10 10   BILITOT <0.2 0.4 0.4   ALKPHOS 55 55 50     No results for input(s): INR in the last 72 hours. Recent Labs     09/21/20 1945 09/22/20 0138 09/22/20  0740   TROPONINI 0.019* 0.015* <0.010       Urinalysis:   No results found for: Carnella Fransisco, BACTERIA, RBCUA, BLOODU, SPECGRAV, GLUCOSEU    Radiology:   Most recent    Chest CT      WITH CONTRAST:No results found for this or any previous visit. WITHOUT CONTRAST: No results found for this or any previous visit. CXR      2-view: No results found for this or any previous visit. Portable: No results found for this or any previous visit. Echo No results found for this or any previous visit. Assessment/Plan:    Active Hospital Problems    Diagnosis Date Noted    Acute pulmonary embolism ([address] [I26.99] 09/22/2020    Pulmonary embolism with acute cor pulmonale (HCC) [I26.09] 09/22/2020    Acute deep vein thrombosis (DVT) of proximal vein of right lower extremity ([address] [I82.4Y1] 09/21/2020    Essential hypertension [I10] 09/21/2020    Hypothyroid [E03.9] 09/21/2020    Mixed hyperlipidemia [E78.2] 09/21/2020    Syncope and collapse [R55] 09/21/2020    Elevated troponin [R79.89] 09/21/2020     1. Acute pulmonary embolism with cor pulmonale- patient denies shortness breath or chest pain. However, she had syncopal episode before coming to the hospital.  She also was found to have DVT. CTA showed bilateral central, segmental and subsegmental PE with mild right heart strain. Patient was also found to have elevated troponin. Will consult cardiology. Will order echo to assess the cardiac strain. Continue patient on Lovenox therapeutic dose. Pulmonology consulted.   1. 9/22 - cont lovenox 1mg/kg bid, echo pending for suspected strain

## 2020-09-23 NOTE — PROGRESS NOTES
INPATIENT PROGRESS NOTES    PATIENT NAME: Karen Ruby  MRN: [id number]  SERVICE DATE:  September 23, 2020   SERVICE TIME:  10:49 AM      PRIMARY SERVICE: Pulmonary Disease    CHIEF COMPLAIN:  DVT and PE       INTERVAL HPI: Patient seen and examined at bedside, Interval Notes, orders reviewed. Nursing notes noted  She is comfortable in bed , RA spo2 98% , no shortness of breath at rest. D/w dr. Francoise Bumpers about thrombectomy if still having shortness of breath , O2 desat, hypotension  or tachycardia with ambulation , since patient has large PE and DVT with  Rt. Ventricular  Strain with dysfunction and Pul hypertension and syncope at home. . D/w dr. Alex Cueva. Hematology consult done due to unprovoked PE and possible hypercoagulable state with lung nodule with possibility of malignancy. OBJECTIVE    Body mass index is 27.37 kg/m². PHYSICAL EXAM:  Vitals:  BP (!) 101/53   Pulse 57   Temp 97.7 °F (36.5 °C) (Oral)   Resp 17   Ht 5' 8\" (1.727 m)   Wt 180 lb (81.6 kg)   SpO2 98%   BMI 27.37 kg/m²   General: Alert, awake . comfortable in bed, No distress. Head: Atraumatic , Normocephalic   Eyes: PERRL. No sclera icterus. No conjunctival injection. No discharge   ENT: No nasal  discharge. Pharynx clear. lips, teeth, mucosa and gums are normal, tongue protrudes in the midline  Neck:  Trachea midline. No thyromegaly, no JVD, No cervical adenopathy. Chest : Bilaterally symmetrical ,Normal effort,  No accessory muscle use  Lung : . Fair BS bilateral, decreased BS at bases. No Rales. No wheezing. No rhonchi. Heart[de-identified] Normal  rate. Regular rhythm. No mumur ,  Rub or gallop  ABD: Non-tender. Non-distended. No masses. No organmegaly. Normal bowel sounds. No hernia. Ext : mild swelling rt. leg . No Cyanosis No clubbing  Neuro: no focal weakness          DATA:   Recent Labs     09/22/20  0740 09/23/20  0546   WBC 8.1 6.8   HGB 14.3 13.1   HCT 41.4 38.5   MCV 97.0 96.9    237     Recent Labs     09/22/20  0740 EXACERBATING CLINICAL FACTORS INCLUDE DIABETES, HYPERTENSION, HYPERLIPIDEMIA, TOBACCO ABUSE OR OTHER ETIOLOGIES. All CT scans at this facility use dose modulation, iterative reconstruction, and/or weight based dosing when appropriate to reduce radiation dose to as low as reasonably achievable. Cta Chest W Wo Contrast    Result Date: 9/22/2020  EXAM: CTA CHEST W WO CONTRAST History: Positive DVT. Leg swelling. Technique: Multiple contiguous axial images of the chest were obtained from the thoracic inlet through the upper abdomen with contrast. Multiplanar reformats including maximum intensity projection images were obtained. Comparison: None available Findings: Visualized portion of the thyroid gland is within normal limits. No axillary, mediastinal, or hilar lymphadenopathy. Filling defects are identified within the distal right main pulmonary artery and right segmental/subsegmental branches supplying the right lower lobe and right middle lobe. Filling defects are identified within the distal left pulmonary artery and segmental/subsegmental branches supplying the left upper lobe and left lower lobe. Mild leftward bowing of the interventricular septum. Heart size is within normal limits. No significant pericardial effusion. Esophagus is within normal limits. Spiculated 2 cm left upper lobe pulmonary nodule as seen on axial series 3 image 30. 6 cm right upper lobe pulmonary nodule as seen on axial series 3 image 20. 8mm right middle lobe pulmonary nodule as seen on axial series 3 image 55. No consolidation, pneumothorax, pleural effusion. Degenerative changes of the spine and left shoulder. Visualized upper abdomen demonstrates a 2 cm left renal cyst and multiple tiny gallstones. Bilateral pulmonary emboli. Mild right heart strain. Spiculated 2 cm left upper lobe pulmonary nodule is concerning for malignancy. Additional 6 mm right upper lobe pulmonary nodule and 8 mm right middle lobe pulmonary nodule. Cholelithiasis. All CT scans at this facility use dose modulation, iterative reconstruction, and/or weight based dosing when appropriate to reduce radiation dose to as low as reasonably achievable. Us Dup Lower Extremity Right Miladys    Result Date: 9/23/2020  EXAMINATION: US DUP LOWER EXTREMITY RIGHT MILADYS DATE AND TIME:9/21/2020 9:10 PM CLINICAL HISTORY: LEG PAIN. LEG SWELLING. LEG SWELLING AND WARM TO TOUCH       COMPARISON:NONE TECHNIQUE: The lower extremity veins were evaluated with color doppler, gray scale imaging and spectral analysis while using compression and augmentation when possible. FINDINGS: Evaluation of the right lower extremity from the thigh to the knee shows occlusive thrombus in the right femoral vein and popliteal vein and probably in the right calf veins. Thrombotic material in the proximal right femoral vein appears mobile. POSITIVE FOR ACUTE DEEP VENOUS THROMBOSIS IN THE RIGHT LOWER EXTREMITY. Us Carotid Artery Bilateral    Result Date: 9/22/2020  EXAMINATION:  CAROTID DUPLEX ULTRASONOGRAPHY CLINICAL HISTORY:  SYNCOPE  COMPARISONS:  None TECHNIQUE:  B-mode, color flow and spectral Doppler FINDINGS:  ARTERIAL BLOOD FLOW VELOCITY RIGHT PS                                               Prox CCA    75 cm/s             Mid CCA     74 cm/s              Dist CCA    74 cm/s              Prox ICA    58 cm/s               Mid ICA     92 cm/s            Dist ICA    74 cm/s             Prox ECA    65 cm/s             Prox VERT   49 cm/s              ICA/CCA     1.25                        LEFT PS Prox CCA    76 cm/s Mid CCA     79 cm/s Dist CCA    67 cm/s Prox ICA    53 cm/s Mid ICA     84 cm/s Dist ICA    48 cm/s Prox ECA    61 cm/s Prox VERT   50 cm/s ICA/CCA     1.07     MINIMAL ATHEROSCLEROSIS AND INTIMAL THICKENING INVOLVING BOTH ICAS. BILATERAL ICA LESS THAN 50% STENOSIS. BILATERAL ANTEGRADE VERTEBRAL FLOW. IMPRESSION AND SUGGESTION:  1.  Acute pulmonary embolism, bilateral central, segmental and subsegmental PE with mild right ventricular strain  2. Right lower extremity DVT  3. 2 cm left upper lung spiculated pulmonary nodule rule out malignancy  4. Tobacco abuse  5. Hypertension  6. Hyperlipidemia  7. Hypothyroidism    Patient is currently on Lovenox 1 mg/kg subcutaneous 12 hourly. 2D echo showing moderate pulmonary hypertension right ventricular global hypokinesis and RV pressure volume overload. D/w dr. Mike San and , if she is candidate for thrombectomy. She will have vital with ambulation and then dr. Trevor Wagoner will decide. Hematology to see patient . D/W  daughter at bed side , d/w her.     .      Electronically signed by Celina Lowery MD, [facility] on 9/23/2020 at 10:49 AM

## 2020-09-24 ENCOUNTER — APPOINTMENT (OUTPATIENT)
Dept: CT IMAGING | Age: 75
DRG: 175 | End: 2020-09-24
Payer: MEDICARE

## 2020-09-24 ENCOUNTER — TELEPHONE (OUTPATIENT)
Dept: PULMONOLOGY | Age: 75
End: 2020-09-24

## 2020-09-24 VITALS
RESPIRATION RATE: 18 BRPM | DIASTOLIC BLOOD PRESSURE: 58 MMHG | OXYGEN SATURATION: 99 % | WEIGHT: 189.5 LBS | BODY MASS INDEX: 28.72 KG/M2 | HEART RATE: 52 BPM | SYSTOLIC BLOOD PRESSURE: 114 MMHG | HEIGHT: 68 IN | TEMPERATURE: 97.9 F

## 2020-09-24 LAB
ALBUMIN SERPL-MCNC: 3.1 G/DL (ref 3.5–4.6)
ALP BLD-CCNC: 46 U/L (ref 40–130)
ALT SERPL-CCNC: 14 U/L (ref 0–33)
ANION GAP SERPL CALCULATED.3IONS-SCNC: 11 MEQ/L (ref 9–15)
AST SERPL-CCNC: 18 U/L (ref 0–35)
BASOPHILS ABSOLUTE: 0 K/UL (ref 0–0.2)
BASOPHILS RELATIVE PERCENT: 0.3 %
BILIRUB SERPL-MCNC: 0.4 MG/DL (ref 0.2–0.7)
BUN BLDV-MCNC: 12 MG/DL (ref 8–23)
CALCIUM SERPL-MCNC: 8.5 MG/DL (ref 8.5–9.9)
CHLORIDE BLD-SCNC: 101 MEQ/L (ref 95–107)
CO2: 22 MEQ/L (ref 20–31)
CREAT SERPL-MCNC: 0.49 MG/DL (ref 0.5–0.9)
EOSINOPHILS ABSOLUTE: 0.1 K/UL (ref 0–0.7)
EOSINOPHILS RELATIVE PERCENT: 1.3 %
GFR AFRICAN AMERICAN: >60
GFR NON-AFRICAN AMERICAN: >60
GLOBULIN: 2.4 G/DL (ref 2.3–3.5)
GLUCOSE BLD-MCNC: 100 MG/DL (ref 70–99)
HCT VFR BLD CALC: 36.2 % (ref 37–47)
HEMOGLOBIN: 12.4 G/DL (ref 12–16)
LYMPHOCYTES ABSOLUTE: 1.2 K/UL (ref 1–4.8)
LYMPHOCYTES RELATIVE PERCENT: 17.1 %
MCH RBC QN AUTO: 33.1 PG (ref 27–31.3)
MCHC RBC AUTO-ENTMCNC: 34.2 % (ref 33–37)
MCV RBC AUTO: 96.6 FL (ref 82–100)
MONOCYTES ABSOLUTE: 0.8 K/UL (ref 0.2–0.8)
MONOCYTES RELATIVE PERCENT: 11 %
NEUTROPHILS ABSOLUTE: 4.9 K/UL (ref 1.4–6.5)
NEUTROPHILS RELATIVE PERCENT: 70.3 %
PDW BLD-RTO: 12.7 % (ref 11.5–14.5)
PLATELET # BLD: 252 K/UL (ref 130–400)
POTASSIUM REFLEX MAGNESIUM: 4.2 MEQ/L (ref 3.4–4.9)
RBC # BLD: 3.75 M/UL (ref 4.2–5.4)
SODIUM BLD-SCNC: 134 MEQ/L (ref 135–144)
TOTAL PROTEIN: 5.5 G/DL (ref 6.3–8)
WBC # BLD: 6.9 K/UL (ref 4.8–10.8)

## 2020-09-24 PROCEDURE — 74177 CT ABD & PELVIS W/CONTRAST: CPT

## 2020-09-24 PROCEDURE — 6370000000 HC RX 637 (ALT 250 FOR IP): Performed by: NURSE PRACTITIONER

## 2020-09-24 PROCEDURE — 2580000003 HC RX 258: Performed by: NURSE PRACTITIONER

## 2020-09-24 PROCEDURE — 6370000000 HC RX 637 (ALT 250 FOR IP): Performed by: INTERNAL MEDICINE

## 2020-09-24 PROCEDURE — 99232 SBSQ HOSP IP/OBS MODERATE 35: CPT | Performed by: INTERNAL MEDICINE

## 2020-09-24 PROCEDURE — 6360000002 HC RX W HCPCS: Performed by: NURSE PRACTITIONER

## 2020-09-24 PROCEDURE — 36415 COLL VENOUS BLD VENIPUNCTURE: CPT

## 2020-09-24 PROCEDURE — 80053 COMPREHEN METABOLIC PANEL: CPT

## 2020-09-24 PROCEDURE — 99233 SBSQ HOSP IP/OBS HIGH 50: CPT | Performed by: INTERNAL MEDICINE

## 2020-09-24 PROCEDURE — 85025 COMPLETE CBC W/AUTO DIFF WBC: CPT

## 2020-09-24 PROCEDURE — 2500000003 HC RX 250 WO HCPCS: Performed by: INTERNAL MEDICINE

## 2020-09-24 PROCEDURE — 6360000004 HC RX CONTRAST MEDICATION: Performed by: INTERNAL MEDICINE

## 2020-09-24 RX ADMIN — IOPAMIDOL 100 ML: 612 INJECTION, SOLUTION INTRAVENOUS at 13:41

## 2020-09-24 RX ADMIN — BARIUM SULFATE 450 ML: 20 SUSPENSION ORAL at 11:17

## 2020-09-24 RX ADMIN — METOPROLOL TARTRATE 25 MG: 25 TABLET, FILM COATED ORAL at 07:59

## 2020-09-24 RX ADMIN — Medication 10 ML: at 07:59

## 2020-09-24 RX ADMIN — ENOXAPARIN SODIUM 80 MG: 80 INJECTION SUBCUTANEOUS at 07:59

## 2020-09-24 RX ADMIN — LEVOTHYROXINE SODIUM 88 MCG: 88 TABLET ORAL at 07:59

## 2020-09-24 ASSESSMENT — ENCOUNTER SYMPTOMS
BLOOD IN STOOL: 0
SHORTNESS OF BREATH: 1
STRIDOR: 0
WHEEZING: 0
NAUSEA: 0
GASTROINTESTINAL NEGATIVE: 1
EYES NEGATIVE: 1
COUGH: 0
CHEST TIGHTNESS: 0

## 2020-09-24 ASSESSMENT — PAIN SCALES - GENERAL
PAINLEVEL_OUTOF10: 0
PAINLEVEL_OUTOF10: 0

## 2020-09-24 NOTE — FLOWSHEET NOTE
Delivered discharge instructions/DME prescription for walker to pt. Educated pt on new prescriptions. IV removed. Tele monitor returned to monitor room. Pt denies additional needs at this time. Assisted pt with dressing. Transport called to assist pt out to car.

## 2020-09-24 NOTE — PROGRESS NOTES
INPATIENT PROGRESS NOTES    PATIENT NAME: Chet Ibarra  MRN: [id number]  SERVICE DATE:  September 24, 2020   SERVICE TIME:  11:02 AM      PRIMARY SERVICE: Pulmonary Disease    CHIEF COMPLAIN:  DVT and PE       INTERVAL HPI: Patient seen and examined at bedside, Interval Notes, orders reviewed. Nursing notes noted  Patient is feeling much better. Discussed with Dr. German Agee. She never had dropping oxygenation with ambulation. On room air O2 saturation 98%. No shortness of breath at rest.. Patient will continue to have Lovenox until discharge then started on Eliquis. Patient did you get hematology orders CT abdomen and pelvis. After CT abdomen and pelvis , she will go home today. OBJECTIVE    Body mass index is 28.81 kg/m². PHYSICAL EXAM:  Vitals:  BP (!) 119/59   Pulse 53   Temp 98.8 °F (37.1 °C) (Oral)   Resp 18   Ht 5' 8\" (1.727 m)   Wt 189 lb 8 oz (86 kg)   SpO2 98%   BMI 28.81 kg/m²   General: Alert, awake . comfortable in bed, No distress. Head: Atraumatic , Normocephalic   Eyes: PERRL. No sclera icterus. No conjunctival injection. No discharge   ENT: No nasal  discharge. Pharynx clear. lips, teeth, mucosa and gums are normal, tongue protrudes in the midline  Neck:  Trachea midline. No thyromegaly, no JVD, No cervical adenopathy. Chest : Bilaterally symmetrical ,Normal effort,  No accessory muscle use  Lung : . Fair BS bilateral, decreased BS at bases. No Rales. No wheezing. No rhonchi. Heart[de-identified] Normal  rate. Regular rhythm. No mumur ,  Rub or gallop  ABD: Non-tender. Non-distended. No masses. No organmegaly. Normal bowel sounds. No hernia. Ext : mild swelling rt. leg . No Cyanosis No clubbing  Neuro: no focal weakness          DATA:   Recent Labs     09/23/20  0546 09/24/20  0507   WBC 6.8 6.9   HGB 13.1 12.4   HCT 38.5 36.2*   MCV 96.9 96.6    252     Recent Labs     09/23/20  0546 09/24/20  0507    134*   K 4.3 4.2    101   CO2 25 22   BUN 12 12   CREATININE 0.52 0.49* GLUCOSE 101* 100*   CALCIUM 8.7 8.5   PROT 5.8* 5.5*   LABALBU 3.3* 3.1*   BILITOT 0.4 0.4   ALKPHOS 50 46   AST 17 18   ALT 10 14   LABGLOM >60.0 >60.0   GFRAA >60.0 >60.0   GLOB 2.5 2.4       MV Settings:          No results for input(s): PHART, PFY0LVZ, PO2ART, WDC4DOP, BEART, O9IIRUMC in the last 72 hours. O2 Device: None (Room air)    DIET GENERAL;     MEDICATIONS during current hospitalization:    Continuous Infusions:    Scheduled Meds:   levothyroxine  88 mcg Oral Daily    atorvastatin  20 mg Oral Nightly    sodium chloride flush  10 mL Intravenous 2 times per day    enoxaparin  1 mg/kg Subcutaneous BID       PRN Meds:diphenhydrAMINE, sodium chloride flush, acetaminophen **OR** acetaminophen, polyethylene glycol, promethazine **OR** ondansetron    Radiology  Ct Head Wo Contrast    Result Date: 9/22/2020  CT Brain Contrast medium:  Not utilized. History:  fall Comparison: Findings: There are no extra-axial collections. There is no evidence of hemorrhage. .  There is mild prominence of the and ventricles indicating chronic involutional changes. .  The basilar cisterns are patent. .  There are no focal lesions or areas of increased or decreased attenuation. .  There is no midline shift. .  The posterior fossa and craniocervical junction are within normal limits. .  The visualized portions of the paranasal sinuses and mastoid air cells are unremarkable. .  The visualized portions of the orbits are within normal limits. The bones are intact. . Incidentally noted there are heavy calcifications of the bilateral palatine tonsils, of uncertain clinical significance. Impression: THERE ARE NO ACUTE INTRACRANIAL CHANGES. THERE IS MILD PROMINENCE OF SULCI AND VENTRICLES WITH PERIVENTRICULAR WHITE MATTER HYPOINTENSITIES WHICH ARE USUALLY ASSOCIATED WITH CHRONIC MICROANGIOPATHY. POSSIBLE EXACERBATING CLINICAL FACTORS INCLUDE DIABETES, HYPERTENSION, HYPERLIPIDEMIA, TOBACCO ABUSE OR OTHER ETIOLOGIES.  All CT scans at this facility use dose modulation, iterative reconstruction, and/or weight based dosing when appropriate to reduce radiation dose to as low as reasonably achievable. Cta Chest W Wo Contrast    Result Date: 9/22/2020  EXAM: CTA CHEST W WO CONTRAST History: Positive DVT. Leg swelling. Technique: Multiple contiguous axial images of the chest were obtained from the thoracic inlet through the upper abdomen with contrast. Multiplanar reformats including maximum intensity projection images were obtained. Comparison: None available Findings: Visualized portion of the thyroid gland is within normal limits. No axillary, mediastinal, or hilar lymphadenopathy. Filling defects are identified within the distal right main pulmonary artery and right segmental/subsegmental branches supplying the right lower lobe and right middle lobe. Filling defects are identified within the distal left pulmonary artery and segmental/subsegmental branches supplying the left upper lobe and left lower lobe. Mild leftward bowing of the interventricular septum. Heart size is within normal limits. No significant pericardial effusion. Esophagus is within normal limits. Spiculated 2 cm left upper lobe pulmonary nodule as seen on axial series 3 image 30. 6 cm right upper lobe pulmonary nodule as seen on axial series 3 image 20. 8mm right middle lobe pulmonary nodule as seen on axial series 3 image 55. No consolidation, pneumothorax, pleural effusion. Degenerative changes of the spine and left shoulder. Visualized upper abdomen demonstrates a 2 cm left renal cyst and multiple tiny gallstones. Bilateral pulmonary emboli. Mild right heart strain. Spiculated 2 cm left upper lobe pulmonary nodule is concerning for malignancy. Additional 6 mm right upper lobe pulmonary nodule and 8 mm right middle lobe pulmonary nodule. Cholelithiasis.  All CT scans at this facility use dose modulation, iterative reconstruction, and/or weight based dosing when appropriate to reduce radiation dose to as low as reasonably achievable. Us Dup Lower Extremity Right Miladys    Result Date: 9/23/2020  EXAMINATION: US DUP LOWER EXTREMITY RIGHT MILADYS DATE AND TIME:9/21/2020 9:10 PM CLINICAL HISTORY: LEG PAIN. LEG SWELLING. LEG SWELLING AND WARM TO TOUCH       COMPARISON:NONE TECHNIQUE: The lower extremity veins were evaluated with color doppler, gray scale imaging and spectral analysis while using compression and augmentation when possible. FINDINGS: Evaluation of the right lower extremity from the thigh to the knee shows occlusive thrombus in the right femoral vein and popliteal vein and probably in the right calf veins. Thrombotic material in the proximal right femoral vein appears mobile. POSITIVE FOR ACUTE DEEP VENOUS THROMBOSIS IN THE RIGHT LOWER EXTREMITY. Us Carotid Artery Bilateral    Result Date: 9/22/2020  EXAMINATION:  CAROTID DUPLEX ULTRASONOGRAPHY CLINICAL HISTORY:  SYNCOPE  COMPARISONS:  None TECHNIQUE:  B-mode, color flow and spectral Doppler FINDINGS:  ARTERIAL BLOOD FLOW VELOCITY RIGHT PS                                               Prox CCA    75 cm/s             Mid CCA     74 cm/s              Dist CCA    74 cm/s              Prox ICA    58 cm/s               Mid ICA     92 cm/s            Dist ICA    74 cm/s             Prox ECA    65 cm/s             Prox VERT   49 cm/s              ICA/CCA     1.25                        LEFT PS Prox CCA    76 cm/s Mid CCA     79 cm/s Dist CCA    67 cm/s Prox ICA    53 cm/s Mid ICA     84 cm/s Dist ICA    48 cm/s Prox ECA    61 cm/s Prox VERT   50 cm/s ICA/CCA     1.07     MINIMAL ATHEROSCLEROSIS AND INTIMAL THICKENING INVOLVING BOTH ICAS. BILATERAL ICA LESS THAN 50% STENOSIS. BILATERAL ANTEGRADE VERTEBRAL FLOW. IMPRESSION AND SUGGESTION:  1. Acute pulmonary embolism, bilateral central, segmental and subsegmental PE with mild right ventricular strain.    2. Right lower extremity DVT  3. 2 cm left upper lung spiculated pulmonary nodule rule out malignancy  4. Tobacco abuse  5. Hypertension  6. Hyperlipidemia  7. Hypothyroidism    Patient is currently on Lovenox 1 mg/kg subcutaneous 12 hourly. Patient's vital signs stable and she is not going for thrombectomy. She is going to have a CT abdomen and pelvis today before discharge. D/W  daughter at bed side , d/w her regarding follow-up in office 3 to 4 weeks regarding further work-up regarding lung nodule. I also discussed about further work-up for lung nodule with  he agreed to wait until 4 weeks of anticoagulation therapy. Okay to discharge when okay by all physician    .       Electronically signed by Brandi Moran MD, FCCP on 9/24/2020 at 11:02 AM

## 2020-09-24 NOTE — PROGRESS NOTES
Progress Note  Patient: Ron Roper  Unit/Bed: D272/Q907-10  YOB: 1945  MRN: [id number]  Acct: [de-identified]   Admitting Diagnosis: Syncope and collapse [R55]  Pulmonary embolism with acute cor pulmonale, unspecified chronicity, unspecified pulmonary embolism type ([facility] [I26.09]  Admit Date:  9/21/2020  Hospital Day: 2    Chief Complaint: PE    Histories:  Past Medical History:   Diagnosis Date    Hyperlipidemia     Hypertension     Pleurisy     Thyroid disease      Past Surgical History:   Procedure Laterality Date    JOINT REPLACEMENT      Right hip     History reviewed. No pertinent family history.   Social History     Socioeconomic History    Marital status:      Spouse name: None    Number of children: None    Years of education: None    Highest education level: None   Occupational History    None   Social Needs    Financial resource strain: None    Food insecurity     Worry: None     Inability: None    Transportation needs     Medical: None     Non-medical: None   Tobacco Use    Smoking status: Current Every Day Smoker     Packs/day: 1.00     Types: Cigarettes    Smokeless tobacco: Never Used   Substance and Sexual Activity    Alcohol use: Not Currently    Drug use: Not Currently    Sexual activity: Not Currently   Lifestyle    Physical activity     Days per week: None     Minutes per session: None    Stress: None   Relationships    Social connections     Talks on phone: None     Gets together: None     Attends [employer] service: None     Active member of club or organization: None     Attends meetings of clubs or organizations: None     Relationship status: None    Intimate partner violence     Fear of current or ex partner: None     Emotionally abused: None     Physically abused: None     Forced sexual activity: None   Other Topics Concern    None   Social History Narrative    None       Subjective/HPI feels better less SOB no Cp eats well.awaiting CT Abd/Pelvis  EKG: SR         Review of Systems:   Review of Systems   Constitutional: Negative. Negative for diaphoresis and fatigue. HENT: Negative. Eyes: Negative. Respiratory: Positive for shortness of breath. Negative for cough, chest tightness, wheezing and stridor. Cardiovascular: Positive for leg swelling. Negative for chest pain and palpitations. Gastrointestinal: Negative. Negative for blood in stool and nausea. Genitourinary: Negative. Musculoskeletal: Negative. Skin: Negative. Neurological: Negative. Negative for dizziness, syncope, weakness and light-headedness. Hematological: Negative. Psychiatric/Behavioral: Negative. Physical Examination:    BP (!) 119/59   Pulse 53   Temp 98.8 °F (37.1 °C) (Oral)   Resp 18   Ht 5' 8\" (1.727 m)   Wt 189 lb 8 oz (86 kg)   SpO2 98%   BMI 28.81 kg/m²    Physical Exam   Constitutional: She appears healthy. No distress. HENT:   Normal cephalic and Atraumatic   Eyes: Pupils are equal, round, and reactive to light. Neck: Normal range of motion and thyroid normal. Neck supple. No JVD present. No neck adenopathy. No thyromegaly present. Cardiovascular: Normal rate, regular rhythm, intact distal pulses and normal pulses. Murmur heard. Pulmonary/Chest: Effort normal and breath sounds normal. She has no wheezes. She has no rales. She exhibits no tenderness. Abdominal: Soft. Bowel sounds are normal. There is no abdominal tenderness. Musculoskeletal: Normal range of motion. General: Edema (1+) present. No tenderness. Neurological: She is alert and oriented to person, place, and time. Skin: Skin is warm. No cyanosis. Nails show no clubbing.        LABS:  CBC:   Lab Results   Component Value Date    WBC 6.9 09/24/2020    RBC 3.75 09/24/2020    RBC 4.41 04/13/2012    HGB 12.4 09/24/2020    HCT 36.2 09/24/2020    MCV 96.6 09/24/2020    MCH 33.1 09/24/2020    MCHC 34.2 09/24/2020    RDW 12.7 09/24/2020     09/24/2020    MPV 7.7 04/09/2015     CBC with Differential:    Lab Results   Component Value Date    WBC 6.9 09/24/2020    RBC 3.75 09/24/2020    RBC 4.41 04/13/2012    HGB 12.4 09/24/2020    HCT 36.2 09/24/2020     09/24/2020    MCV 96.6 09/24/2020    MCH 33.1 09/24/2020    MCHC 34.2 09/24/2020    RDW 12.7 09/24/2020    LYMPHOPCT 17.1 09/24/2020    MONOPCT 11.0 09/24/2020    EOSPCT 2.0 04/13/2012    BASOPCT 0.3 09/24/2020    MONOSABS 0.8 09/24/2020    LYMPHSABS 1.2 09/24/2020    EOSABS 0.1 09/24/2020    BASOSABS 0.0 09/24/2020     CMP:    Lab Results   Component Value Date     09/24/2020    K 4.2 09/24/2020     09/24/2020    CO2 22 09/24/2020    BUN 12 09/24/2020    CREATININE 0.49 09/24/2020    GFRAA >60.0 09/24/2020    LABGLOM >60.0 09/24/2020    GLUCOSE 100 09/24/2020    GLUCOSE 96 04/13/2012    PROT 5.5 09/24/2020    LABALBU 3.1 09/24/2020    LABALBU 4.3 04/13/2012    CALCIUM 8.5 09/24/2020    BILITOT 0.4 09/24/2020    ALKPHOS 46 09/24/2020    AST 18 09/24/2020    ALT 14 09/24/2020     BMP:    Lab Results   Component Value Date     09/24/2020    K 4.2 09/24/2020     09/24/2020    CO2 22 09/24/2020    BUN 12 09/24/2020    LABALBU 3.1 09/24/2020    LABALBU 4.3 04/13/2012    CREATININE 0.49 09/24/2020    CALCIUM 8.5 09/24/2020    GFRAA >60.0 09/24/2020    LABGLOM >60.0 09/24/2020    GLUCOSE 100 09/24/2020    GLUCOSE 96 04/13/2012     Magnesium:    Lab Results   Component Value Date    MG 1.7 09/21/2020     Troponin:    Lab Results   Component Value Date    TROPONINI <0.010 09/22/2020        Active Hospital Problems    Diagnosis Date Noted    Acute pulmonary embolism ([facility] [I26.99] 09/22/2020     Priority: Low    Pulmonary embolism with acute cor pulmonale (HCC) [I26.09] 09/22/2020     Priority: Low    Acute deep vein thrombosis (DVT) of proximal vein of right lower extremity ([facility] [I82.4Y1] 09/21/2020     Priority: Low    Essential hypertension [I10] 09/21/2020     Priority: Low  Hypothyroid [E03.9] 2020     Priority: Low    Mixed hyperlipidemia [E78.2] 2020     Priority: Low    Syncope and collapse [R55] 2020     Priority: Low    Elevated troponin [R79.89] 2020     Priority: Low        Assessment/Plan:  Alisa Marshall MD    Physician    Cardiology    Consults    Signed    Date of Service:  2020 10:00 AM             Consult Orders    Inpatient consult to Cardiology [[id number]] ordered by Amanda Crane RN, NP            Signed         Expand All Collapse All      Show:Clear all  [x]Manual[x]Template[]Copied    Added by:  [x]Nish Diamond MD    []Hover for details  Consults     Patient Name: Loan Grande Date: 2020  7:12 PM  MR #: [id number]  : 1945     Attending Physician: Iglesia Kaufman MD  Reason for consult: PE     History of Presenting Illness:       Bryon Guadarrama is a 66 y.o. female on hospital day 0 with a history of . History Obtained From:  patient, electronic medical record     Admitted with SOB weakness and loss of appetite. RLE edema noted for several days. -RLE DVT and B/L PE. Unfortunately discovered ALE Lung mass 2cm meng[icios for cancer. In recent days she has had cople episodes of syncope. Trops detected. CT chest shows RV strain.      Denies CP. Feels some better. No prior CAD PAD nor CVA     Continued smoker  _+HF heart  History:       EKG:SR  Past Medical History        Past Medical History:   Diagnosis Date    Hyperlipidemia      Hypertension      Pleurisy      Thyroid disease          Past Surgical History         Past Surgical History:   Procedure Laterality Date    JOINT REPLACEMENT         Right hip           Family History  Family History   History reviewed. No pertinent family history. []?  Unable to obtain due to ventilated and/ or neurologic status     Social History               Socioeconomic History    Marital status:        Spouse name: Not on file    Number of children: Not on file    Years of education: Not on file    Highest education level: Not on file   Occupational History    Not on file   Social Needs    Financial resource strain: Not on file    Food insecurity       Worry: Not on file       Inability: Not on file    Transportation needs       Medical: Not on file       Non-medical: Not on file   Tobacco Use    Smoking status: Current Every Day Smoker       Packs/day: 1.00       Types: Cigarettes    Smokeless tobacco: Never Used   Substance and Sexual Activity    Alcohol use: Not Currently    Drug use: Not Currently    Sexual activity: Not Currently   Lifestyle    Physical activity       Days per week: Not on file       Minutes per session: Not on file    Stress: Not on file   Relationships    Social connections       Talks on phone: Not on file       Gets together: Not on file       Attends [employer] service: Not on file       Active member of club or organization: Not on file       Attends meetings of clubs or organizations: Not on file       Relationship status: Not on file    Intimate partner violence       Fear of current or ex partner: Not on file       Emotionally abused: Not on file       Physically abused: Not on file       Forced sexual activity: Not on file   Other Topics Concern    Not on file   Social History Narrative    Not on file         []?  Unable to obtain due to ventilated and/ or neurologic status        Home Medications:        Prescriptions Prior to Admission   Medications Prior to Admission: simvastatin (ZOCOR) 40 MG tablet, Take 40 mg by mouth nightly  levothyroxine (SYNTHROID) 88 MCG tablet, Take 88 mcg by mouth Daily  lisinopril-hydroCHLOROthiazide (PRINZIDE;ZESTORETIC) 20-25 MG per tablet, Take 1 tablet by mouth daily        Current Hospital Medications:      Scheduled Meds:  Scheduled Medications    levothyroxine  88 mcg Oral Daily    atorvastatin  20 mg Oral Nightly    aspirin  81 mg Oral Daily    metoprolol tartrate  25 mg Oral BID    sodium chloride flush  10 mL Intravenous 2 times per day    enoxaparin  1 mg/kg Subcutaneous BID        Continuous Infusions:  Infusions Meds        PRN Meds:. PRN Medications   sodium chloride flush, acetaminophen **OR** acetaminophen, polyethylene glycol, promethazine **OR** ondansetron     . Infusions Meds            Allergies:           Allergies   Allergen Reactions    Asa [Aspirin]      Pcn [Penicillins]           Review of Systems:       Review of Systems   Constitutional: Positive for appetite change. Negative for diaphoresis and fatigue. HENT: Negative. Eyes: Negative. Respiratory: Positive for shortness of breath. Negative for cough, chest tightness, wheezing and stridor. Cardiovascular: Positive for leg swelling. Negative for chest pain and palpitations. Gastrointestinal: Negative. Negative for blood in stool and nausea. Genitourinary: Negative. Musculoskeletal: Negative. Skin: Negative. Neurological: Positive for weakness. Negative for dizziness, syncope and light-headedness. Hematological: Negative. Psychiatric/Behavioral: Negative.             Objective Findings:      Vitals:BP (!) 100/53   Pulse 64   Temp 98.1 °F (36.7 °C) (Oral)   Resp 17   Ht 5' 8\" (1.727 m)   Wt 180 lb (81.6 kg)   SpO2 94%   BMI 27.37 kg/m²       Physical Examination:     Physical Exam   Constitutional: No distress. She appears acutely ill. HENT:   Normal cephalic and Atraumatic   Eyes: Pupils are equal, round, and reactive to light. Neck: Normal range of motion and thyroid normal. Neck supple. No JVD present. No neck adenopathy. No thyromegaly present. Cardiovascular: Normal rate, regular rhythm, intact distal pulses and normal pulses. Murmur heard. Pulmonary/Chest: Effort normal and breath sounds normal. She has no wheezes. She has no rales. She exhibits no tenderness. Abdominal: Soft. Bowel sounds are normal. There is no abdominal tenderness.    Musculoskeletal: Normal range of motion. General: Edema (RLE 2+) present. No tenderness. Neurological: She is alert and oriented to person, place, and time. Skin: Skin is warm. No cyanosis. Nails show no clubbing.          Results/ Medications reviewed 9/22/2020, 10:00 AM      Laboratory, Microbiology, Pathology, Radiology, Cardiology, Medications and Transcriptions reviewed  Scheduled Meds:  Scheduled Medications    levothyroxine  88 mcg Oral Daily    atorvastatin  20 mg Oral Nightly    aspirin  81 mg Oral Daily    metoprolol tartrate  25 mg Oral BID    sodium chloride flush  10 mL Intravenous 2 times per day    enoxaparin  1 mg/kg Subcutaneous BID        Continuous Infusions:  Infusions Meds                Recent Labs     09/21/20 1945 09/22/20  0740   WBC 8.4 8.1   HGB 13.6 14.3   HCT 39.3 41.4   MCV 96.0 97.0    226          Recent Labs     09/21/20 1945 09/22/20  0740    140   K 4.1 4.2    105   CO2 23 22   BUN 16 13   CREATININE 0.55 0.48*          Recent Labs     09/21/20 1945 09/22/20  0740   AST 20 17   ALT 9 10   BILITOT <0.2 0.4   ALKPHOS 55 55     No results for input(s): LIPASE, AMYLASE in the last 72 hours. Recent Labs     09/21/20 1945 09/22/20  0740   PROT 6.1* 6.4     Ct Head Wo Contrast     Result Date: 9/22/2020  CT Brain Contrast medium:  Not utilized. History:  fall Comparison: Findings: There are no extra-axial collections. There is no evidence of hemorrhage. .  There is mild prominence of the and ventricles indicating chronic involutional changes. .  The basilar cisterns are patent. .  There are no focal lesions or areas of increased or decreased attenuation. .  There is no midline shift. .  The posterior fossa and craniocervical junction are within normal limits. .  The visualized portions of the paranasal sinuses and mastoid air cells are unremarkable. .  The visualized portions of the orbits are within normal limits. The bones are intact. . Incidentally noted there are heavy calcifications of the bilateral palatine tonsils, of uncertain clinical significance.      Impression: THERE ARE NO ACUTE INTRACRANIAL CHANGES. THERE IS MILD PROMINENCE OF SULCI AND VENTRICLES WITH PERIVENTRICULAR WHITE MATTER HYPOINTENSITIES WHICH ARE USUALLY ASSOCIATED WITH CHRONIC MICROANGIOPATHY. POSSIBLE EXACERBATING CLINICAL FACTORS INCLUDE DIABETES, HYPERTENSION, HYPERLIPIDEMIA, TOBACCO ABUSE OR OTHER ETIOLOGIES. All CT scans at this facility use dose modulation, iterative reconstruction, and/or weight based dosing when appropriate to reduce radiation dose to as low as reasonably achievable.               Active Hospital Problems     Diagnosis Date Noted    Acute pulmonary embolism ([address] [I26.99] 09/22/2020       Priority: Low    Acute deep vein thrombosis (DVT) of proximal vein of right lower extremity ([address] [I82.4Y1] 09/21/2020       Priority: Low    Essential hypertension [I10] 09/21/2020       Priority: Low    Hypothyroid [E03.9] 09/21/2020       Priority: Low    Mixed hyperlipidemia [E78.2] 09/21/2020       Priority: Low    Syncope and collapse [R55] 09/21/2020       Priority: Low    Elevated troponin [R79.89] 09/21/2020       Priority: Low         Impression/Plan: 1. B/L PE from RLE- on Lovenox. Stable. Feels better. Change to NOAC prior to dc. Will ambulate in aragon and monitor Vitals- if there are deterioration such as hypoxia hypotension or tachycardia- pt may benefit from thrombectomy. Discussed with Baltazar Dailey and Debora Yacolt Jian. - did well with Aragon Ambulation with no Desaturation, Tachycadia nor Hypotension. No need for Thrombectomy at this time. Awaiting CT Abd ?pelvis  2. Echo EF 65% with RV strain with dysfunction. 3. HTN Stable  4. Add ASA and BB. Continue Statin.    5. Stop smoking                        Electronically signed by Hilary Espinal MD on 9/24/2020 at 8:56 AM

## 2020-09-24 NOTE — PROGRESS NOTES
Hematology/Oncology   Progress Note        CHIEF COMPLAINT/HPI:  Follow up of pulmonary embolism and lung mass. CEA normal. For ct branch of the abdomen. Still shortness with activity. REVIEW OF SYSTEMS:    Unremarkable except for symptoms mentioned in HPI.     Current Inpatient Medications:    Current Facility-Administered Medications   Medication Dose Route Frequency Provider Last Rate Last Dose    diphenhydrAMINE (BENADRYL) tablet 25 mg  25 mg Oral Q6H PRN Aldair Naik MD   25 mg at 09/23/20 2048    levothyroxine (SYNTHROID) tablet 88 mcg  88 mcg Oral Daily Gonzalo Duncan RN, NP   88 mcg at 09/24/20 0759    atorvastatin (LIPITOR) tablet 20 mg  20 mg Oral Nightly Gonzalo Duncan RN, NP   20 mg at 09/23/20 2048    sodium chloride flush 0.9 % injection 10 mL  10 mL Intravenous 2 times per day Gonzalo Duncan RN, NP   10 mL at 09/24/20 0759    sodium chloride flush 0.9 % injection 10 mL  10 mL Intravenous PRN Gonzalo Duncan RN, NP        acetaminophen (TYLENOL) tablet 650 mg  650 mg Oral Q6H PRN Gonzalo Duncan RN, MATT        Or    acetaminophen (TYLENOL) suppository 650 mg  650 mg Rectal Q6H PRN Gonzalo Duncan RN, NP        polyethylene glycol (GLYCOLAX) packet 17 g  17 g Oral Daily PRN Gonzalo Duncan RN, NP        promethazine (PHENERGAN) tablet 12.5 mg  12.5 mg Oral Q6H PRN Gonzalo Duncan RN, MATT        Or    ondansetron (ZOFRAN) injection 4 mg  4 mg Intravenous Q6H PRN Gonzalo Duncan RN, NP        enoxaparin (LOVENOX) injection 80 mg  1 mg/kg Subcutaneous BID Gonzalo Duncan RN, NP   80 mg at 09/24/20 0759       PHYSICAL EXAM:    EYES:  Lids and lashes normal, pupils equal, round and reactive to light, extra ocular muscles intact, sclera clear, conjunctiva normal    ENT:  Normocephalic, without obvious abnormality, atraumatic, sinuses nontender on palpation, external ears without lesions, oral pharynx with moist mucus membranes, tonsils without erythema or exudates, gums normal and good dentition. NECK:  Supple, symmetrical, trachea midline, no adenopathy, thyroid symmetric, not enlarged and no tenderness, skin normal    CHEST:    LUNGS:  No increased work of breathing, good air exchange, clear to auscultation bilaterally, no crackles or wheezing    CARDIOVASCULAR:  Normal apical impulse, regular rate and rhythm, normal S1 and S2, no S3 or S4, and no murmur noted    ABDOMEN:  No scars, normal bowel sounds, soft, non-distended, non-tender, no masses palpated, no hepatosplenomegally    MUSCULOSKELETAL:  There is no redness, warmth, or swelling of the joints. Full range of motion noted. Motor strength is 5 out of 5 all extremities bilaterally.   Tone is normal.  EXTREMITIES:    NEURO:    DATA:      PT/INR:  No results found for: PTINR  PTT:  No results found for: APTT  CMP:    Lab Results   Component Value Date     09/24/2020    K 4.2 09/24/2020     09/24/2020    CO2 22 09/24/2020    BUN 12 09/24/2020    PROT 5.5 09/24/2020     Magnesium:    Lab Results   Component Value Date    MG 1.7 09/21/2020     Phosphorus:  No components found for: PO4  Calcium:  No components found for: CA  CBC:    Lab Results   Component Value Date    WBC 6.9 09/24/2020    RBC 3.75 09/24/2020    RBC 4.41 04/13/2012    HGB 12.4 09/24/2020    HCT 36.2 09/24/2020    MCV 96.6 09/24/2020    RDW 12.7 09/24/2020     09/24/2020     DIFF:    Lab Results   Component Value Date    MCV 96.6 09/24/2020    RDW 12.7 09/24/2020      LDH:    Lab Results   Component Value Date     09/23/2020     Uric Acid:  No components found for: URIC    EKG Reviewed  Appropriate Radiology Reviewed      Pathology: Reviewed where indicated      ASSESSMENT:  Principal Problem:    Acute deep vein thrombosis (DVT) of proximal vein of right lower extremity (HCC)  Active Problems:    Essential hypertension    Hypothyroid    Mixed hyperlipidemia    Syncope and collapse    Elevated troponin    Acute pulmonary embolism (HCC)    Pulmonary embolism with acute cor pulmonale (HCC)  Resolved Problems:    * No resolved hospital problems. *    Patient Active Problem List   Diagnosis    Acute deep vein thrombosis (DVT) of proximal vein of right lower extremity (HCC)    Essential hypertension    Hypothyroid    Mixed hyperlipidemia    Syncope and collapse    Elevated troponin    Acute pulmonary embolism (HCC)    Pulmonary embolism with acute cor pulmonale (HCC)       PLAN:  For ct scan of the abdomen and pelvis.            Electronically signed by Ramesh Murray MD on 9/24/20 at 12:22 PM EDT

## 2020-09-24 NOTE — FLOWSHEET NOTE
Clinic Visit Note    Subjective:  Katiana Triplett is a 21 year old female who presents for the following issues:    1. ADHD follow up: the patient presents to clinic today to follow up on her ADHD and adderall. She is happy with her current dose. She denies any adverse side effects. Denies cardiac concerns. Denies sleep concern. 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 She does need a refill soon. She has no acute concerns today regarding this treatment plan.     2. Skin lesion follow up: The patient presents today for follow up on her skin lesions in her inguinal/ genital area. She has two main skin lesions that she wanted re -evaluated. One is in the left inguinal area. The other is on the left labia majora. She noticed recently she was shaving the left labial area and she did shave over the lesion causing a break in the skin. Due to this she states the lesion almost doubled in size. She now wanted to see if they could be removed. She denies any open skin lesions today. She denies any erythema or pain. She otherwise is feeling well.     She has no other concerns today.         ALLERGIES:   Allergen Reactions   • Prozac DEPRESSION       Current Outpatient Medications   Medication Sig Dispense Refill   • [START ON 2/2/2020] amphetamine-dextroamphetamine XR (ADDERALL XR) 10 MG 24 hr capsule Take 1 capsule by mouth daily. Do not start before February 2, 2020. Allow generic 30 capsule 0   • escitalopram (LEXAPRO) 20 MG tablet Take 1 tablet by mouth daily. 90 tablet 2   • levonorgestrel (KYLEENA) 19.5 MG intrauterine device 19.5 mg by Intrauterine route. Insertion date 8/13/2018     • rizatriptan (MAXALT) 10 MG tablet Take 1 tablet by mouth as needed for Migraine (Use no closer than 8 hours apart, no more than three doses per week ideally). 12 tablet 1     No current facility-administered medications for this visit.         Most Recent Immunizations   Administered  PAGED DR. Jean Wheeler 19. Date(s) Administered   • DTaP 07/18/2001   • HIB, Unspecified Formulation 01/30/1997   • HPV 9-Valent 01/24/2020   • Hep B, adolescent or pediatric 07/10/1997   • Hepatitis A - Adult 09/04/2019   • Influenza, injectable, quadrivalent 11/13/2018   • Influenza, injectable, quadrivalent, preservative-free 09/11/2019   • Influenza, seasonal, injectable, trivalent 12/05/2011   • MMR 07/18/2001   • Meningococcal Conjugate MCV4P (Menactra) 07/22/2013   • Novel Influenza [id number], Unspecified Formulation 11/14/2009   • Polio, INACTIVATED 07/18/2001   • Polio, Oral 1996   • Tdap 04/14/2019       Past Medical History:   Diagnosis Date   • ADHD (attention deficit hyperactivity disorder)    • Allergy    • Anxiety    • Depression    • Gastroenteritis     traveler's diarrhea   • Gilbert syndrome     Liver u/s 12/28/12 & 8/3/12 unremarkable; Peds GI evaluated w/ no concerns.   • Orthostatic hypotension 11/20/2014   • PCOS (polycystic ovarian syndrome)    • Scheuermann disease        Past Surgical History:   Procedure Laterality Date   • Spinal fusion  2015   • Tonsillectomy and adenoidectomy          Family History   Problem Relation Age of Onset   • High blood pressure Mother    • High cholesterol Mother    • Diabetes Mother    • Arthritis Mother        Social History     Socioeconomic History   • Marital status: Single     Spouse name: Not on file   • Number of children: Not on file   • Years of education: Not on file   • Highest education level: Not on file   Occupational History   • Occupation: student     Comment: S   Social Needs   • Financial resource strain: Not on file   • Food insecurity:     Worry: Not on file     Inability: Not on file   • Transportation needs:     Medical: Not on file     Non-medical: Not on file   Tobacco Use   • Smoking status: Never Smoker   • Smokeless tobacco: Never Used   Substance and Sexual Activity   • Alcohol use: Yes     Alcohol/week: 0.0 standard drinks     Frequency: 2-4 times a  month     Drinks per session: 1 or 2     Binge frequency: Never     Comment: rarely    • Drug use: No   • Sexual activity: Yes     Partners: Male     Birth control/protection: I.U.D.   Lifestyle   • Physical activity:     Days per week: Not on file     Minutes per session: Not on file   • Stress: Not on file   Relationships   • Social connections:     Talks on phone: Not on file     Gets together: Not on file     Attends Yazidism service: Not on file     Active member of club or organization: Not on file     Attends meetings of clubs or organizations: Not on file     Relationship status: Not on file   • Intimate partner violence:     Fear of current or ex partner: Not on file     Emotionally abused: Not on file     Physically abused: Not on file     Forced sexual activity: Not on file   Other Topics Concern   • Not on file   Social History Narrative    Live with mom and dad        REVIEW OF SYSTEMS:   Cardiovascular problem(s): negative  Respiratory problem(s): negative  Integumentary problem(s): skin lesions, inguinal area/ genital area  Psychiatric problem(s): ADHD stable and well controlled.     Objective:  Visit Vitals  /60   Pulse 86   Temp 98.3 °F (36.8 °C) (Oral)   Ht 5' 11\" (1.803 m)   Wt 80.7 kg   LMP 01/22/2020 (Exact Date)   SpO2 97%   Breastfeeding No   BMI 24.83 kg/m²     BODY MASS INDEX: Body mass index is 24.83 kg/m².    PHYSICAL EXAMINATION:    Examination of the patient reveals:   GENERAL: appears stated age and well developed and well nourished  SKIN normal color, normal texture and normal turgor. Exam of the left inguinal area was positive for a raised skin colored lesion that was about 5 mm in size. No surrounding erythema. She had a second lesion that was raised and was about 4 mm in diameter. Also skin colored, no surrounding erythema.   HEAD: normocephalic  EYES: sclerae and conjunctivae are normal lids and lashes are normal  HEART: normal rate and rhythm, no murmurs and no extra heart  sounds  NEUROLOGIC: cranial nerves 2 through 12 grossly normal, coordination normal and no tremor noted  EXTREMITIES: normal muscle tone and development bilaterally  LUNGS: Normal respiratory effort. lungs are clear to auscultation with normal inspiratory/expiratory sounds, no rales, no rhonchi and no wheezes  PSYCHIATRIC: normal affect and judgement     Katiana was seen today for anxiety and lesion.    Diagnoses and all orders for this visit:    Attention deficit hyperactivity disorder (ADHD), unspecified ADHD type:  Doing well on her current dose. I did refill medication and dated it for future. PDMP reviewed; no aberrant behavior identified, prescription authorized. If any concerns arise she'll let me know. Return in 6 months, sooner PRN. She was comfortable with this plan  -     amphetamine-dextroamphetamine XR (ADDERALL XR) 10 MG 24 hr capsule; Take 1 capsule by mouth daily. Do not start before February 2, 2020. Allow generic      Skin lesions, generalized:  We did review her skin lesions. She is concerned they grow in size when they are accidentally shaved over. We did review that I could do a shave excision/ biopsy today however there may be scarring, and if they tend to grow in size this still may be an issue. We also reviewed referral to dermatology to review options of removal. After discussion she agreed to see dermatology. Referral placed.   -     SERVICE TO DERMATOLOGY    Anxiety and depression:  She also just needed a refill of her lexapro. She is doing well. Refill provided  -     escitalopram (LEXAPRO) 20 MG tablet; Take 1 tablet by mouth daily.        Health Care Maintenance:   Need for vaccination  -     HPV 9 VALENT VACC    FOLLOW UP:  6 months as above

## 2020-09-24 NOTE — PROGRESS NOTES
Hospitalist Daily Progress Note  Name: Dylan Tan  Age: 66 y.o. Gender: female  CodeStatus: Full Code  Allergies: Asa [Aspirin]  Pcn [Penicillins]    Chief Complaint:Other (increased weakness, leg swelling in right leg \"not feeling well since friday/sat\" decreased appetite increased fatigue)      Primary Care Provider: Obey Bueno DO    InpatientTreatment Team: Treatment Team: Attending Provider: Donavan Ryder MD; Consulting Physician: Glenna Nguyen MD; Consulting Physician: Reece Thomas MD; Consulting Physician: Ardeth Boeck, MD; Utilization Reviewer: Ana Allen, RN; : Bianca Parr, RN; Registered Nurse: Billy Hu, RN; Utilization Reviewer: Jaye Aponte, RN; Registered Nurse: Roshan Govea, RN; Registered Nurse: Carlitos Najera RN    Admission Date: 9/21/2020      Subjective: No chest pain. SOB resolved. Daughter at bedside. Physical Exam  Vitals signs and nursing note reviewed. Constitutional:       Appearance: Normal appearance. Cardiovascular:      Rate and Rhythm: Normal rate and regular rhythm. Pulmonary:      Effort: Pulmonary effort is normal.      Breath sounds: Normal breath sounds. Abdominal:      General: Bowel sounds are normal.      Palpations: Abdomen is soft. Musculoskeletal: Normal range of motion. Skin:     General: Skin is warm and dry. Neurological:      Mental Status: She is alert and oriented to person, place, and time. Mental status is at baseline.          Medications:  Reviewed    Infusion Medications:   Scheduled Medications:    levothyroxine  88 mcg Oral Daily    atorvastatin  20 mg Oral Nightly    sodium chloride flush  10 mL Intravenous 2 times per day    enoxaparin  1 mg/kg Subcutaneous BID     PRN Meds: diphenhydrAMINE, sodium chloride flush, acetaminophen **OR** acetaminophen, polyethylene glycol, promethazine **OR** ondansetron    Labs:   Recent Labs     09/22/20  0740 09/23/20  0546 09/24/20  0507   WBC 8.1 6.8 6.9 HGB 14.3 13.1 12.4   HCT 41.4 38.5 36.2*    237 252     Recent Labs     09/22/20  0740 09/23/20  0546 09/24/20  0507    136 134*   K 4.2 4.3 4.2    102 101   CO2 22 25 22   BUN 13 12 12   CREATININE 0.48* 0.52 0.49*   CALCIUM 9.0 8.7 8.5     Recent Labs     09/22/20  0740 09/23/20  0546 09/24/20  0507   AST 17 17 18   ALT 10 10 14   BILITOT 0.4 0.4 0.4   ALKPHOS 55 50 46     No results for input(s): INR in the last 72 hours. Recent Labs     09/21/20  1945 09/22/20  0138 09/22/20  0740   TROPONINI 0.019* 0.015* <0.010       Urinalysis:   No results found for: Jolena Pancake, BACTERIA, RBCUA, BLOODU, SPECGRAV, GLUCOSEU    Radiology:   Most recent    Chest CT      WITH CONTRAST:No results found for this or any previous visit. WITHOUT CONTRAST: No results found for this or any previous visit. CXR      2-view: No results found for this or any previous visit. Portable: No results found for this or any previous visit. Echo No results found for this or any previous visit. Assessment/Plan:    Active Hospital Problems    Diagnosis Date Noted    Acute pulmonary embolism ([facility] [address] [I26.99] 09/22/2020    Pulmonary embolism with acute cor pulmonale (HCC) [I26.09] 09/22/2020    Acute deep vein thrombosis (DVT) of proximal vein of right lower extremity ([facility] [address] [I82.4Y1] 09/21/2020    Essential hypertension [I10] 09/21/2020    Hypothyroid [E03.9] 09/21/2020    Mixed hyperlipidemia [E78.2] 09/21/2020    Syncope and collapse [R55] 09/21/2020    Elevated troponin [R79.89] 09/21/2020     1. Acute pulmonary embolism with cor pulmonale- patient denies shortness breath or chest pain. However, she had syncopal episode before coming to the hospital.  She also was found to have DVT. CTA showed bilateral central, segmental and subsegmental PE with mild right heart strain. Patient was also found to have elevated troponin. Will consult cardiology. Will order echo to assess the cardiac strain. Continue patient on Lovenox therapeutic dose. Pulmonology consulted. 1. 9/22 - cont lovenox 1mg/kg bid, echo pending for suspected strain however patient clinically in no distress, cardiology and pulmonology following  2. 9/24 - dc home today with rolling walker, patient needs as she is suffering from PE, DVT, and likely new lung cancer. She requires it to ambulate at home. 2. Acute deep vein thrombosis on the right-right lower extremity edema and erythema and warmth. Ultrasound showed occlusive thrombus in the right superficial femoral vein, occlusive thrombus in the popliteal vein, possible thrombus in the right calf vein. Continue Lovenox for now. Patient also has bilateral pulmonary embolism with right heart strain. Patient might require IVC filter. Will follow cardiology and pulmonology recommendation. 3. Lung mass-suspicious for malignancy 2 cm in size in the left upper lobe. History of tobacco abuse and gradual unintentional weight loss. Will consult pulmonology for assessment and possible biopsy.   9/22 - await pulm recs, probable need for oncology evaluation  9/24 - ct abd/pel no sign of mets, outpatient follow up    Electronically signed by Skyler Zaragoza MD on 9/24/2020 at 5:55 PM

## 2020-09-25 LAB
ANTICARDIOLIPIN IGG ANTIBODY: 6 GPL (ref 0–14)
CARDIOLIPIN AB IGM: 0 MPL (ref 0–12)

## 2020-09-26 LAB
BETA-2 GLYCOPROTEIN 1 IGA ANTIBODY: 3 SAU (ref 0–20)
BETA-2 GLYCOPROTEIN 1 IGG ANTIBODY: 0 SGU (ref 0–20)
BETA-2 GLYCOPROTEIN 1 IGM ANTIBODY: 7 SMU (ref 0–20)
DRVVT CONFIRMATION TEST: ABNORMAL RATIO
DRVVT SCREEN: 43 SEC (ref 33–44)
DRVVT,DIL: ABNORMAL SEC (ref 33–44)
HEXAGONAL PHOSPHOLIPID NEUTRALIZAT TEST: ABNORMAL
LUPUS ANTICOAG INTERP: ABNORMAL
PLT NEUTA: ABNORMAL
PT D: 13.8 SEC (ref 12–15.5)
PTT D: 77 SEC (ref 32–48)
PTT-D CORR REFLEX: 47 SEC (ref 32–48)
PTT-HEPARIN NEUTRALIZED: 53 SEC (ref 32–48)
REPTILASE TIME: 17 SEC
THROMBIN TIME: 20.5 SEC (ref 14.7–19.5)

## 2020-09-29 LAB — FACTOR V LEIDEN: NEGATIVE

## 2020-10-16 ENCOUNTER — OFFICE VISIT (OUTPATIENT)
Dept: PULMONOLOGY | Age: 75
End: 2020-10-16
Payer: COMMERCIAL

## 2020-10-16 VITALS
HEIGHT: 68 IN | BODY MASS INDEX: 27.89 KG/M2 | WEIGHT: 184 LBS | DIASTOLIC BLOOD PRESSURE: 64 MMHG | OXYGEN SATURATION: 98 % | HEART RATE: 72 BPM | TEMPERATURE: 97.3 F | RESPIRATION RATE: 16 BRPM | SYSTOLIC BLOOD PRESSURE: 140 MMHG

## 2020-10-16 PROBLEM — R91.1 LUNG NODULE: Status: ACTIVE | Noted: 2020-10-16

## 2020-10-16 PROCEDURE — 99214 OFFICE O/P EST MOD 30 MIN: CPT | Performed by: INTERNAL MEDICINE

## 2020-10-16 ASSESSMENT — ENCOUNTER SYMPTOMS
SHORTNESS OF BREATH: 0
EYE DISCHARGE: 0
ABDOMINAL PAIN: 0
SINUS PRESSURE: 0
WHEEZING: 0
TROUBLE SWALLOWING: 0
CHEST TIGHTNESS: 0
RHINORRHEA: 0
VOMITING: 0
NAUSEA: 0
COUGH: 0
EYE ITCHING: 0
SORE THROAT: 0
DIARRHEA: 0
VOICE CHANGE: 0

## 2020-10-16 NOTE — PROGRESS NOTES
Subjective:     Melisa Luu is a 66 y.o. female who complains today of:     Chief Complaint   Patient presents with    Follow-up     f/u after hosp. Pt was seen for acute PE and DVT. HPI  He  is on Eliquis  for PE, she was in hospital with DVT and bilateral PE  She is following with [facility] , dr. Brent Martinez. She was also  found having 2 cm left upper lung nodule. And need f/u   No C/o shortness of breath   No Wheezing   No Cough with  Sputum  No Hemoptysis  No Chest tightness   No Chest pain with radiation  or pleuritic pain  No Fever or chills. No Rhinorrhea and postnasal drip. She still has mild swelling left leg. Allergies:  Asa [aspirin] and Pcn [penicillins]  Past Medical History:   Diagnosis Date    Hyperlipidemia     Hypertension     Pleurisy     Thyroid disease      Past Surgical History:   Procedure Laterality Date    JOINT REPLACEMENT      Right hip     No family history on file.   Social History     Socioeconomic History    Marital status:      Spouse name: Not on file    Number of children: Not on file    Years of education: Not on file    Highest education level: Not on file   Occupational History    Not on file   Social Needs    Financial resource strain: Not on file    Food insecurity     Worry: Not on file     Inability: Not on file    Transportation needs     Medical: Not on file     Non-medical: Not on file   Tobacco Use    Smoking status: Former Smoker     Packs/day: 1.00     Types: Cigarettes     Last attempt to quit: 2020     Years since quittin.0    Smokeless tobacco: Never Used    Tobacco comment: pt has intermittented smoking history   Substance and Sexual Activity    Alcohol use: Not Currently    Drug use: Not Currently    Sexual activity: Not Currently   Lifestyle    Physical activity     Days per week: Not on file     Minutes per session: Not on file    Stress: Not on file   Relationships    Social connections     Talks on phone: Not on file     Gets together: Not on file     Attends Latter day service: Not on file     Active member of club or organization: Not on file     Attends meetings of clubs or organizations: Not on file     Relationship status: Not on file    Intimate partner violence     Fear of current or ex partner: Not on file     Emotionally abused: Not on file     Physically abused: Not on file     Forced sexual activity: Not on file   Other Topics Concern    Not on file   Social History Narrative    Not on file         Review of Systems   Constitutional: Negative for chills, diaphoresis, fatigue and fever. HENT: Negative for congestion, mouth sores, nosebleeds, postnasal drip, rhinorrhea, sinus pressure, sneezing, sore throat, trouble swallowing and voice change. Eyes: Negative for discharge, itching and visual disturbance. Respiratory: Negative for cough, chest tightness, shortness of breath and wheezing. Cardiovascular: Negative for chest pain, palpitations and leg swelling. Gastrointestinal: Negative for abdominal pain, diarrhea, nausea and vomiting. Genitourinary: Negative for difficulty urinating and hematuria. Musculoskeletal: Negative for arthralgias, joint swelling and myalgias. Skin: Negative for rash. Allergic/Immunologic: Negative for environmental allergies and food allergies. Neurological: Negative for dizziness, tremors, weakness and headaches. Psychiatric/Behavioral: Negative for behavioral problems and sleep disturbance.         :     Vitals:    10/16/20 1132 10/16/20 1140   BP: (!) 140/64 (!) 140/64   Pulse: 72    Resp: 16    Temp: 97.3 °F (36.3 °C)    TempSrc: Temporal    SpO2: 98%    Weight: 184 lb (83.5 kg)    Height: 5' 8\" (1.727 m)      Wt Readings from Last 3 Encounters:   10/16/20 184 lb (83.5 kg)   09/24/20 189 lb 8 oz (86 kg)         Physical Exam  Constitutional:       General: She is not in acute distress. Appearance: She is well-developed. She is not diaphoretic. HENT:      Head: Normocephalic and atraumatic. Nose: Nose normal.   Eyes:      Pupils: Pupils are equal, round, and reactive to light. Neck:      Thyroid: No thyromegaly. Vascular: No JVD. Trachea: No tracheal deviation. Cardiovascular:      Rate and Rhythm: Normal rate and regular rhythm. Heart sounds: No murmur. No friction rub. No gallop. Pulmonary:      Effort: No respiratory distress. Breath sounds: No wheezing or rales. Chest:      Chest wall: No tenderness. Abdominal:      General: There is no distension. Tenderness: There is no abdominal tenderness. There is no rebound. Musculoskeletal: Normal range of motion. Lymphadenopathy:      Cervical: No cervical adenopathy. Skin:     General: Skin is warm and dry. Neurological:      Mental Status: She is alert and oriented to person, place, and time. Coordination: Coordination normal.         Current Outpatient Medications   Medication Sig Dispense Refill    apixaban (ELIQUIS DVT/PE STARTER PACK) 5 MG TABS tablet Take 10 mg (2 tablets) orally twice daily for 7 days, then take 5 mg (1 tablet) orally twice daily thereafter. 74 tablet 0    [START ON 10/20/2020] apixaban (ELIQUIS) 5 MG TABS tablet Take 1 tablet by mouth 2 times daily 60 tablet 5    simvastatin (ZOCOR) 40 MG tablet Take 40 mg by mouth nightly      levothyroxine (SYNTHROID) 88 MCG tablet Take 88 mcg by mouth Daily       No current facility-administered medications for this visit. Assessment/Plan:     1. Other acute pulmonary embolism with acute cor pulmonale (HCC)  She   is on Eliquis  for PE, she was in hospital with DVT and bilateral PE  She is following with [facility] , dr. Patricio Rose. She is also following with hematooncologist.She was also  found having 2 cm left upper lung nodule. And need f/u . No C/o shortness of breath,  No Wheezing ,  No Cough with  Sputum.     2. Acute deep vein thrombosis (DVT) of proximal vein of right lower extremity ([address]  She still has some swelling in her right lower extremity but she said is it is much less and she is not having any pain. She is on Eliquis    3. Lung nodule  She was also  found having 2 cm left upper lung nodule while she was admitted with DVT and PE. And with history of chronic smoking may have underlying malignancy and need further work-up. - PET CT Skull Base To Mid Thigh; Future      Return in about 2 weeks (around 10/30/2020) for pulmonary embolism, PET scan f/u, lung nodule f/u.       Romero Zafar MD

## 2020-10-21 PROBLEM — R77.8 ELEVATED TROPONIN: Status: RESOLVED | Noted: 2020-09-21 | Resolved: 2020-10-21

## 2020-10-30 ENCOUNTER — HOSPITAL ENCOUNTER (OUTPATIENT)
Dept: CT IMAGING | Age: 75
Discharge: HOME | End: 2020-11-01
Payer: MEDICARE

## 2020-10-30 PROCEDURE — 78815 PET IMAGE W/CT SKULL-THIGH: CPT

## 2020-10-30 PROCEDURE — 3430000000 HC RX DIAGNOSTIC RADIOPHARMACEUTICAL: Performed by: INTERNAL MEDICINE

## 2020-10-30 PROCEDURE — A9552 F18 FDG: HCPCS | Performed by: INTERNAL MEDICINE

## 2020-10-30 RX ORDER — FLUDEOXYGLUCOSE F 18 200 MCI/ML
17.5 INJECTION, SOLUTION INTRAVENOUS
Status: COMPLETED | OUTPATIENT
Start: 2020-10-30 | End: 2020-10-30

## 2020-10-30 RX ADMIN — FLUDEOXYGLUCOSE F 18 17.5 MILLICURIE: 200 INJECTION, SOLUTION INTRAVENOUS at 11:35

## 2020-11-06 ENCOUNTER — OFFICE VISIT (OUTPATIENT)
Dept: PULMONOLOGY | Age: 75
End: 2020-11-06
Payer: MEDICARE

## 2020-11-06 VITALS
RESPIRATION RATE: 16 BRPM | HEART RATE: 78 BPM | HEIGHT: 68 IN | DIASTOLIC BLOOD PRESSURE: 60 MMHG | OXYGEN SATURATION: 96 % | SYSTOLIC BLOOD PRESSURE: 114 MMHG | TEMPERATURE: 97.3 F | BODY MASS INDEX: 27.74 KG/M2 | WEIGHT: 183 LBS

## 2020-11-06 PROBLEM — I26.99 PULMONARY EMBOLUS (HCC): Status: ACTIVE | Noted: 2020-09-22

## 2020-11-06 PROBLEM — Z72.0 TOBACCO ABUSE: Status: ACTIVE | Noted: 2020-11-06

## 2020-11-06 PROCEDURE — 99214 OFFICE O/P EST MOD 30 MIN: CPT | Performed by: INTERNAL MEDICINE

## 2020-11-06 ASSESSMENT — ENCOUNTER SYMPTOMS
SINUS PRESSURE: 0
DIARRHEA: 0
CHEST TIGHTNESS: 0
COUGH: 0
VOMITING: 0
ABDOMINAL PAIN: 0
NAUSEA: 0
SHORTNESS OF BREATH: 0
WHEEZING: 0
VOICE CHANGE: 0
EYE DISCHARGE: 0
RHINORRHEA: 0
TROUBLE SWALLOWING: 0
SORE THROAT: 0
EYE ITCHING: 0

## 2020-11-06 NOTE — PROGRESS NOTES
Subjective:     Mya Stringer is a 66 y.o. female who complains today of:     Chief Complaint   Patient presents with    Follow-up     two week f/u for PET CT results and Stress Test Clearance. HPI  Patient had PET scan done for 2 cm left upper lung nodule. No C/o shortness of breath. No  Wheezing   No Cough with Sputum  No Hemoptysis  No Chest tightness   No Chest pain with radiation  or pleuritic pain  No Fever or chills. No Rhinorrhea and postnasal drip. She is smoking since she was in hospital 20 ,she was smoking 3/4 ppd before that. She did not have PFT done. She  is on Eliquis  for DVT and bilateral PE. . Rt. Leg swelling almost back to baseline as patient   She came with daughter and they would  Like to go to [facility] with Dr. Chase Blanco. Nicole Kuhn, oncologist and decide about further plan     Allergies:  Asa [aspirin] and Pcn [penicillins]  Past Medical History:   Diagnosis Date    Hyperlipidemia     Hypertension     Pleurisy     Thyroid disease      Past Surgical History:   Procedure Laterality Date    JOINT REPLACEMENT      Right hip     No family history on file.   Social History     Socioeconomic History    Marital status:      Spouse name: Not on file    Number of children: Not on file    Years of education: Not on file    Highest education level: Not on file   Occupational History    Not on file   Social Needs    Financial resource strain: Not on file    Food insecurity     Worry: Not on file     Inability: Not on file    Transportation needs     Medical: Not on file     Non-medical: Not on file   Tobacco Use    Smoking status: Former Smoker     Packs/day: 1.00     Types: Cigarettes     Last attempt to quit: 2020     Years since quittin.1    Smokeless tobacco: Never Used    Tobacco comment: pt has intermittented smoking history   Substance and Sexual Activity    Alcohol use: Not Currently    Drug use: Not Currently    Sexual activity: Not Currently   Lifestyle  Physical activity     Days per week: Not on file     Minutes per session: Not on file    Stress: Not on file   Relationships    Social connections     Talks on phone: Not on file     Gets together: Not on file     Attends Confucianism service: Not on file     Active member of club or organization: Not on file     Attends meetings of clubs or organizations: Not on file     Relationship status: Not on file    Intimate partner violence     Fear of current or ex partner: Not on file     Emotionally abused: Not on file     Physically abused: Not on file     Forced sexual activity: Not on file   Other Topics Concern    Not on file   Social History Narrative    Not on file         Review of Systems   Constitutional: Negative for chills, diaphoresis, fatigue and fever. HENT: Negative for congestion, mouth sores, nosebleeds, postnasal drip, rhinorrhea, sinus pressure, sneezing, sore throat, trouble swallowing and voice change. Eyes: Negative for discharge, itching and visual disturbance. Respiratory: Negative for cough, chest tightness, shortness of breath and wheezing. Cardiovascular: Negative for chest pain, palpitations and leg swelling. Gastrointestinal: Negative for abdominal pain, diarrhea, nausea and vomiting. Genitourinary: Negative for difficulty urinating and hematuria. Musculoskeletal: Negative for arthralgias, joint swelling and myalgias. Skin: Negative for rash. Allergic/Immunologic: Negative for environmental allergies and food allergies. Neurological: Negative for dizziness, tremors, weakness and headaches.    Psychiatric/Behavioral: Negative for behavioral problems and sleep disturbance.         :     Vitals:    11/06/20 1051   BP: 114/60   Pulse: 78   Resp: 16   Temp: 97.3 °F (36.3 °C)   TempSrc: Temporal   SpO2: 96%   Weight: 183 lb (83 kg)   Height: 5' 8\" (1.727 m)     Wt Readings from Last 3 Encounters:   11/06/20 183 lb (83 kg)   10/16/20 184 lb (83.5 kg)   09/24/20 189 lb 8 oz (86 kg)         Physical Exam  Constitutional:       General: She is not in acute distress. Appearance: She is well-developed. She is not diaphoretic. HENT:      Head: Normocephalic and atraumatic. Nose: Nose normal.   Eyes:      Pupils: Pupils are equal, round, and reactive to light. Neck:      Thyroid: No thyromegaly. Vascular: No JVD. Trachea: No tracheal deviation. Cardiovascular:      Rate and Rhythm: Normal rate and regular rhythm. Heart sounds: No murmur. No friction rub. No gallop. Pulmonary:      Effort: No respiratory distress. Breath sounds: No wheezing or rales. Chest:      Chest wall: No tenderness. Abdominal:      General: There is no distension. Tenderness: There is no abdominal tenderness. There is no rebound. Musculoskeletal: Normal range of motion. Lymphadenopathy:      Cervical: No cervical adenopathy. Skin:     General: Skin is warm and dry. Neurological:      Mental Status: She is alert and oriented to person, place, and time. Coordination: Coordination normal.         Current Outpatient Medications   Medication Sig Dispense Refill    apixaban (ELIQUIS DVT/PE STARTER PACK) 5 MG TABS tablet Take 10 mg (2 tablets) orally twice daily for 7 days, then take 5 mg (1 tablet) orally twice daily thereafter. 74 tablet 0    apixaban (ELIQUIS) 5 MG TABS tablet Take 1 tablet by mouth 2 times daily 60 tablet 5    simvastatin (ZOCOR) 40 MG tablet Take 40 mg by mouth nightly      levothyroxine (SYNTHROID) 88 MCG tablet Take 88 mcg by mouth Daily       No current facility-administered medications for this visit. Assessment/Plan:     1. Lung nodule  She  had PET scan done for 2 cm left upper lung nodule. PET scan is positive and she will need biopsy. PET scan reviewed with patient which shows 2 cm left upper lung nodule is hypermetabolic with maximum SUV of 7.1.   Since patient has only PET positive solitary lung nodule recommends surgically take it out. She came with daughter and they would  Like to go to [facility] with Dr. Emi Castaneda. Sanchez Canales, oncologist and decide about further plan     2. Other acute pulmonary embolism with acute cor pulmonale ([facility] [address]  She is on Eliquis for DVT and PE. She denies having any chest pain or shortness of breath at this time. 3. Acute deep vein thrombosis (DVT) of proximal vein of right lower extremity ([address]  She  is on Eliquis  for DVT and bilateral PE. . Rt. Leg swelling almost back to baseline as patient     4. Tobacco abuse  She is smoking since she was in hospital 9/21/20 ,she was smoking 3/4 ppd before that. Return in about 3 months (around 2/6/2021).       Jostin Heller MD

## 2022-10-12 ENCOUNTER — OFFICE VISIT (OUTPATIENT)
Dept: FAMILY MEDICINE CLINIC | Age: 77
End: 2022-10-12
Payer: MEDICARE

## 2022-10-12 VITALS
DIASTOLIC BLOOD PRESSURE: 80 MMHG | SYSTOLIC BLOOD PRESSURE: 120 MMHG | BODY MASS INDEX: 27.74 KG/M2 | HEIGHT: 68 IN | TEMPERATURE: 102.5 F | HEART RATE: 67 BPM | OXYGEN SATURATION: 98 % | WEIGHT: 183 LBS

## 2022-10-12 DIAGNOSIS — J22 LOWER RESP. TRACT INFECTION: Primary | ICD-10-CM

## 2022-10-12 DIAGNOSIS — R50.9 FEVER, UNSPECIFIED FEVER CAUSE: ICD-10-CM

## 2022-10-12 DIAGNOSIS — R05.9 COUGH, UNSPECIFIED TYPE: ICD-10-CM

## 2022-10-12 LAB
Lab: NORMAL
PERFORMING INSTRUMENT: NORMAL
QC PASS/FAIL: NORMAL
RSV ANTIGEN: NORMAL
SARS-COV-2, POC: NORMAL

## 2022-10-12 PROCEDURE — 99213 OFFICE O/P EST LOW 20 MIN: CPT | Performed by: NURSE PRACTITIONER

## 2022-10-12 PROCEDURE — 1123F ACP DISCUSS/DSCN MKR DOCD: CPT | Performed by: NURSE PRACTITIONER

## 2022-10-12 PROCEDURE — 87426 SARSCOV CORONAVIRUS AG IA: CPT | Performed by: NURSE PRACTITIONER

## 2022-10-12 PROCEDURE — 86756 RESPIRATORY VIRUS ANTIBODY: CPT | Performed by: NURSE PRACTITIONER

## 2022-10-12 RX ORDER — AZITHROMYCIN 250 MG/1
250 TABLET, FILM COATED ORAL SEE ADMIN INSTRUCTIONS
Qty: 6 TABLET | Refills: 0 | Status: SHIPPED | OUTPATIENT
Start: 2022-10-12 | End: 2022-10-17

## 2022-10-12 RX ORDER — ONDANSETRON 8 MG/1
8 TABLET, ORALLY DISINTEGRATING ORAL EVERY 8 HOURS PRN
COMMUNITY
Start: 2022-02-10

## 2022-10-12 RX ORDER — NALOXONE HYDROCHLORIDE 4 MG/.1ML
SPRAY NASAL
COMMUNITY
Start: 2022-02-10

## 2022-10-12 RX ORDER — OXYCODONE HYDROCHLORIDE 5 MG/1
5 TABLET ORAL EVERY 8 HOURS PRN
COMMUNITY
Start: 2022-02-25

## 2022-10-12 SDOH — ECONOMIC STABILITY: FOOD INSECURITY: WITHIN THE PAST 12 MONTHS, YOU WORRIED THAT YOUR FOOD WOULD RUN OUT BEFORE YOU GOT MONEY TO BUY MORE.: NEVER TRUE

## 2022-10-12 SDOH — ECONOMIC STABILITY: FOOD INSECURITY: WITHIN THE PAST 12 MONTHS, THE FOOD YOU BOUGHT JUST DIDN'T LAST AND YOU DIDN'T HAVE MONEY TO GET MORE.: NEVER TRUE

## 2022-10-12 ASSESSMENT — PATIENT HEALTH QUESTIONNAIRE - PHQ9
SUM OF ALL RESPONSES TO PHQ QUESTIONS 1-9: 0
SUM OF ALL RESPONSES TO PHQ9 QUESTIONS 1 & 2: 0
SUM OF ALL RESPONSES TO PHQ QUESTIONS 1-9: 0
2. FEELING DOWN, DEPRESSED OR HOPELESS: 0
1. LITTLE INTEREST OR PLEASURE IN DOING THINGS: 0
SUM OF ALL RESPONSES TO PHQ QUESTIONS 1-9: 0
SUM OF ALL RESPONSES TO PHQ QUESTIONS 1-9: 0

## 2022-10-12 ASSESSMENT — ENCOUNTER SYMPTOMS
CHEST TIGHTNESS: 0
SINUS PAIN: 0
SORE THROAT: 0
COUGH: 1
ABDOMINAL PAIN: 0
RHINORRHEA: 1
SHORTNESS OF BREATH: 0
SINUS PRESSURE: 0

## 2022-10-12 ASSESSMENT — SOCIAL DETERMINANTS OF HEALTH (SDOH): HOW HARD IS IT FOR YOU TO PAY FOR THE VERY BASICS LIKE FOOD, HOUSING, MEDICAL CARE, AND HEATING?: NOT HARD AT ALL

## 2022-10-12 ASSESSMENT — VISUAL ACUITY: OU: 1

## 2022-10-12 NOTE — PROGRESS NOTES
Subjective  Julián Casanova, 76 y.o. female presents today with:  Chief Complaint   Patient presents with    Cough     Dry cough, nasal congestion, chest congestion          HPI  Presents to [facility] for cough and congestion   Symptoms started last night   Fatigued   Nasal congestion/drainage & body aches   Dry cough. Clear phlegm if productive   Denies chest tightness or SOB   Denies chest pain   Denies GI abnormalities   Lessened appetite  Hydrating well   Denies urinary symptoms   History of surgical removal lung CA. CT yesterday for check up . Discussed won't do CXR since imaging yesterday. Quit smoking 9/2020  COVID-19 end of July/August. Took paxlovid. Past Medical History:   Diagnosis Date    Hyperlipidemia     Hypertension     Pleurisy     Thyroid disease       Past Surgical History:   Procedure Laterality Date    JOINT REPLACEMENT      Right hip     No family history on file. Review of Systems   Constitutional:  Positive for appetite change and fatigue. Negative for activity change, chills and diaphoresis. Fever: unsure. not taking temp. HENT:  Positive for congestion and rhinorrhea. Negative for ear pain, sinus pressure, sinus pain and sore throat. Respiratory:  Positive for cough. Negative for chest tightness and shortness of breath. Cardiovascular:  Negative for chest pain and palpitations. Gastrointestinal:  Negative for abdominal pain. Musculoskeletal:  Negative for myalgias. Arthralgias: chronic. Neurological:  Negative for dizziness, light-headedness and headaches. Psychiatric/Behavioral:  Negative for sleep disturbance. PMH, Surgical Hx, Family Hx, and Social Hx reviewed and updated.             Objective  Vitals:    10/12/22 1228   BP: 120/80   Pulse: 67   Temp: (!) 102.5 °F (39.2 °C)   SpO2: 98%   Weight: 183 lb (83 kg)   Height: 5' 8\" (1.727 m)     BP Readings from Last 3 Encounters:   10/12/22 120/80   11/06/20 114/60   10/16/20 (!) 140/64 Wt Readings from Last 3 Encounters:   10/12/22 183 lb (83 kg)   11/06/20 183 lb (83 kg)   10/16/20 184 lb (83.5 kg)             Physical Exam  Vitals reviewed. Constitutional:       General: She is not in acute distress. Appearance: Normal appearance. She is not toxic-appearing. HENT:      Right Ear: Tympanic membrane, ear canal and external ear normal.      Left Ear: Tympanic membrane, ear canal and external ear normal.      Nose: Congestion present. Right Sinus: No maxillary sinus tenderness or frontal sinus tenderness. Left Sinus: No maxillary sinus tenderness or frontal sinus tenderness. Mouth/Throat:      Lips: Pink. Mouth: Mucous membranes are moist.      Pharynx: Oropharynx is clear. Uvula midline. No oropharyngeal exudate, posterior oropharyngeal erythema or uvula swelling. Eyes:      General: Lids are normal. Vision grossly intact. Conjunctiva/sclera: Conjunctivae normal.   Cardiovascular:      Rate and Rhythm: Normal rate. Pulses: Normal pulses. Pulmonary:      Effort: Pulmonary effort is normal.      Breath sounds: Examination of the right-lower field reveals decreased breath sounds. Decreased breath sounds present. Musculoskeletal:      Cervical back: Normal range of motion. No rigidity. Lymphadenopathy:      Head:      Right side of head: No submental, submandibular, tonsillar, preauricular or posterior auricular adenopathy. Left side of head: No submental, submandibular, tonsillar, preauricular or posterior auricular adenopathy. Cervical: No cervical adenopathy. Skin:     General: Skin is warm and dry. Capillary Refill: Capillary refill takes less than 2 seconds. Coloration: Skin is not pale. Findings: No rash. Neurological:      General: No focal deficit present. Mental Status: She is alert and oriented to person, place, and time. Assessment & Plan    Diagnosis Orders   1.  Lower resp. tract infection azithromycin (ZITHROMAX) 250 MG tablet      2. Fever, unspecified fever cause        3. Cough, unspecified type  POCT COVID-19, Antigen    POCT RSV        Orders Placed This Encounter   Procedures    POCT COVID-19, Antigen     Order Specific Question:   Is this test for diagnosis or screening? Answer:   Diagnosis of ill patient     Order Specific Question:   Symptomatic for COVID-19 as defined by CDC? Answer:   Yes     Order Specific Question:   Date of Symptom Onset     Answer:   10/12/2022     Order Specific Question:   Hospitalized for COVID-19? Answer:   No     Order Specific Question:   Admitted to ICU for COVID-19? Answer:   No     Order Specific Question:   Employed in healthcare setting? Answer:   No     Order Specific Question:   Resident in a congregate (group) care setting? Answer:   No     Order Specific Question:   Pregnant? Answer:   No     Order Specific Question:   Previously tested for COVID-19? Answer:   Unknown    POCT RSV     Orders Placed This Encounter   Medications    azithromycin (ZITHROMAX) 250 MG tablet     Sig: Take 1 tablet by mouth See Admin Instructions for 5 days 500mg on day 1 followed by 250mg on days 2 - 5     Dispense:  6 tablet     Refill:  0       Return if symptoms worsen or fail to improve, for follow up with PCP. Reviewed with the patient: current clinical status & medications. Side effects, adverse effects of the medication prescribed today, as well as treatment plan/rationale and result expectations have been discussed with the patient who expressed understanding. How can you care for yourself at home? Drink lots of water and other fluids. This helps thin the mucus and soothes a dry or sore throat. Honey or lemon juice in hot water or tea may ease a dry cough. Take cough medicine as directed by your doctor. Prop up your head on pillows to help you breathe and ease a dry cough.   Try cough drops or hard candy to soothe a dry or sore throat. Close follow up to evaluate treatment results and for coordination of care. I have reviewed the patient's medical history in detail and updated the computerized patient record.       KENZIE Hampton NP

## 2024-04-11 DIAGNOSIS — C34.90 MALIGNANT NEOPLASM OF LUNG, UNSPECIFIED LATERALITY, UNSPECIFIED PART OF LUNG (MULTI): ICD-10-CM

## 2024-04-23 NOTE — PROGRESS NOTES
4/13/23 Patient Discussion/Summary     Ms. Mallory is an 77yo with a 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 with completion lobectomy on 1/21/21 with Dr. Martinez for a pT1cN0 NSCLC. Imaging today shows no new concerning lesions and no evidence of recurrence. She is now >2 years out from her resection. Therefore we will plan for RTC in 1 year with a CT chest and a virtual visit to follow.      Chief Complaint     A telephone visit (audio only) between the patient (at the originating site) and the provider (at the distant site) was utilized to provide this telehealth service.   Virtual follow up visit      History of Present IllnessMs. Mallory is a pleasant 77 yo F h/o Lt upper lobe pT1cN0 NSCLC s/p lobectomy by Dr. Martinez on 1/21/21.      She is present today for a virtual visit with her daughter to review surveillance imaging. She has been doing well from a respiratory standpoint since her last visit - denies any SOB, CP, cough, wheezing, or hemoptysis.  She did well with her 2nd knee surgery in January.     By way of review: patient was previously seen by my partner, Dr. Martinez - she presented as a 75yoF who was found to have a left upper lobe nodule that on bronch was biopsy + NSCLC. She reported no significant resp sxs at that time including no cough, hemoptysis or fever. This nodule was found when she was admitted to an OSH with a PE. She was treated with anticoagulation. She is now s/p VATS converted to open VALERIE for a GERTRUDE wedge with completion lobectomy for pT1cN0 NSCLC on 1/21/21

## 2024-04-24 ENCOUNTER — APPOINTMENT (OUTPATIENT)
Dept: RADIOLOGY | Facility: HOSPITAL | Age: 79
End: 2024-04-24
Payer: COMMERCIAL

## 2024-04-25 ENCOUNTER — TELEMEDICINE (OUTPATIENT)
Dept: SURGERY | Facility: HOSPITAL | Age: 79
End: 2024-04-25
Payer: COMMERCIAL

## 2024-04-29 ENCOUNTER — HOSPITAL ENCOUNTER (OUTPATIENT)
Dept: RADIOLOGY | Facility: HOSPITAL | Age: 79
Discharge: HOME | End: 2024-04-29
Payer: COMMERCIAL

## 2024-04-29 DIAGNOSIS — C34.90 MALIGNANT NEOPLASM OF LUNG, UNSPECIFIED LATERALITY, UNSPECIFIED PART OF LUNG (MULTI): ICD-10-CM

## 2024-04-29 PROCEDURE — 71250 CT THORAX DX C-: CPT | Performed by: RADIOLOGY

## 2024-04-29 PROCEDURE — 71250 CT THORAX DX C-: CPT

## 2024-04-30 ENCOUNTER — TELEMEDICINE (OUTPATIENT)
Dept: SURGERY | Facility: CLINIC | Age: 79
End: 2024-04-30
Payer: COMMERCIAL

## 2024-04-30 DIAGNOSIS — R91.1 LUNG NODULE: Primary | ICD-10-CM

## 2024-04-30 DIAGNOSIS — C34.12 MALIGNANT NEOPLASM OF UPPER LOBE OF LEFT LUNG (MULTI): ICD-10-CM

## 2024-04-30 DIAGNOSIS — R91.1 LUNG NODULE: ICD-10-CM

## 2024-04-30 DIAGNOSIS — Z01.818 PRE-OP TESTING: ICD-10-CM

## 2024-04-30 PROCEDURE — 1159F MED LIST DOCD IN RCRD: CPT | Performed by: STUDENT IN AN ORGANIZED HEALTH CARE EDUCATION/TRAINING PROGRAM

## 2024-04-30 PROCEDURE — 99443 PR PHYS/QHP TELEPHONE EVALUATION 21-30 MIN: CPT | Performed by: STUDENT IN AN ORGANIZED HEALTH CARE EDUCATION/TRAINING PROGRAM

## 2024-04-30 NOTE — PROGRESS NOTES
Bronchoscopy Scheduling Request    Pre-bronchoscopy visit: New patient visit with Bronchoscopy group provider  Please schedule procedure: Next available    Cytology on-site:  Yes  Location:  [facility]  Performing physician:  Advanced diagnostic bronchoscopist  Referring physician:  Gissell Pacheco DO, Roxanne Barrett Rogers, DO  Indication:  RUL nodule h/o GERTRUDE lobectomy for NSCLC in 2021  Sedation / Anesthesia:  GA  Procedure:  Navigational bronchoscopy, Staging EBUS  Time:  Tier 3  Fluorscopy:   Yes  Imaging needed:  CT Kalia - same day as procedure  Labs:  CBC, BMP  Meds:  None  Special Considerations:  None  Reviewed by:  Tho Bishop MD     Her daughter Ish is  [phone]

## 2024-04-30 NOTE — PROGRESS NOTES
C/C:  Follow up visit    History Of Present Illness  Brianne Mallory is a 79 y.o. female h/o Lt upper lobe pT1cN0 NSCLC s/p lobectomy by Dr. Martinez on 1/21/21.      She is present today for a virtual visit with her daughter to review surveillance imaging. She has been doing well from a respiratory standpoint since her last visit - denies any SOB, CP, cough, wheezing, or hemoptysis. Had some fatigue/decreased energy levels but these have improved with Vit B12/Vit D - being monitored by her PCP.   No unexplained weight loss or other acute sxs.      By way of review: patient was previously seen by my partner, Dr. Martinez - she presented as a 75yoF who was found to have a left upper lobe nodule that on bronch was biopsy + NSCLC. She reported no significant resp sxs at that time including no cough, hemoptysis or fever. This nodule was found when she was admitted to an OSH with a PE. She was treated with anticoagulation. She is now s/p VATS converted to open VALERIE for a GERTRUDE wedge with completion lobectomy for pT1cN0 NSCLC on 1/21/21    Past Medical History  She has no past medical history on file.    Social History  She has no history on file for tobacco use, alcohol use, and drug use.      Medications  No current outpatient medications on file.    Allergies  Patient has no allergy information on record.    Review of Systems:  Review of Systems   Constitutional: No fevers, chills, unexpected weight change  HENT: No sore throat, congestion, or nasal drainage  Eyes: No visual changes or eye itching  Respiratory:  see HPI. No cough, worsening dyspnea, wheezing  Cardiac: No chest pain, palpitations, or lower extremity edema  Gastrointestinal: No nausea, vomiting, diarrhea. No abdominal pain  Genitourinary: No dysuria or hematuria  Musculoskeletal: No back pain. No significant myalgias or arthralgias  Neurologic: No headaches, dizziness, or seizures.  Hematologic: No east bleeding or bruising.  Psychiatric: No anxiety or  depression.    Physical Exam:  Physical Exam  There were no vitals taken for this visit.    Neurological:      General: No focal deficit present.      Mental Status: Patient is alert and oriented to person, place, and time.   Psychiatric:         Mood and Affect: Mood normal.       Relevant Results:     Pathology:  Component 3 yr ago   Pathology Report Name JANELL MANJARREZ                                                                                                 Accession #: [id number]            Pathologist:                   RIGO EVERETT MD  Date of Procedure:    1/21/2021  Date Received:          1/21/2021  Date Reported           2/8/2021  Submitting Physician:   GREG BURGOS MD  Location:                    [facility]  Other External #                                                                   FINAL DIAGNOSIS  A.  LUNG, LEFT UPPER LOBE, WEDGE:  --INVASIVE POORLY DIFFERENTIATED ADENOCARCINOMA. SEE CASE SUMMARY REPORT.     B.  LEVEL 9 LYMPH NODE:  --LYMPH NODE WITH NO EVIDENCE OF MALIGNANCY.     C.  LEVEL 10 LYMPH NODE:  --LYMPH NODE FRAGMENTS WITH NO EVIDENCE OF MALIGNANCY.     D.  LEVEL 10 LYMPH NODE #2:  --LYMPH NODE FRAGMENTS WITH NO EVIDENCE OF MALIGNANCY.     E.  LEVEL 5 LYMPH NODE:  --LYMPH NODE FRAGMENTS WITH NO EVIDENCE OF MALIGNANCY.     F.  LEVEL 7 LYMPH NODE:  --LYMPH NODE FRAGMENTS WITH NO EVIDENCE OF MALIGNANCY.     G.  LUNG, LEFT UPPER LOBE, LOBECTOMY:    --RESIDUAL ADENOCARCINOMA. SEE CASE SUMMARY REPORT.  --3 LYMPH NODES WITH NO EVIDENCE OF MALIGNANCY.    H.  RIB:    --GROSSLY UNREMARKABLE SEGMENT OF RIB.    I.  LUNG, RIGHT LOWER LOBE, WEDGE:    --SMALL PORTION OF LUNG WITH RECENT HEMORRHAGE AND SMALL SUBPLEURAL LYMPHOID  AGGREGATE; NO EVIDENCE OF MALIGNANCY.    gwyn    The gross and/or microscopic findings were reviewed in conjunction with  pathology resident, Stephanie Jc M.D.                                                                                                     "                                           CASE SUMMARY REPORT       SPECIMEN  Procedure:      Wedge resection       Completion lobectomy  Specimen Laterality:      Left  TUMOR  Tumor Site:      Upper lobe of lung  Histologic Type:      Invasive adenocarcinoma, acinar predominant        Other Subtypes Present: Micropapillary, cribriform, solid  Histologic Grade:      G3: Poorly differentiated     Total Tumor Size (size of entire tumor):      Greatest Dimension  (Centimeters): 2.4 cm  Tumor Focality:      Single focus  Visceral Pleura Invasion:      Not identified  Direct Invasion of Adjacent Structures:      No adjacent structures present  Treatment Effect:      No known presurgical therapy  Lymphovascular Invasion:      Not identified  MARGINS  Margins:      All margins are uninvolved by tumor   Margins Examined:        Bronchial         Vascular         Parenchymal   Distance of Invasive Carcinoma from Closest Margin (Centimeters):          3.0 cm    Closest Margin:         Bronchial  LYMPH NODES  Regional Lymph Nodes:   Number of Lymph Nodes Involved:        0   Number of Lymph Nodes Examined:        At least: 8    John Stations Examined:         7: Subcarinal          5: Subaortic / aortopulmonary (AP) / AP window          9L: Pulmonary ligament          10L: Hilar          12L: Lobar          Left: Intraparenchymal  PATHOLOGIC STAGE CLASSIFICATION (pTNM, AJCC 8th Edition)  Primary Tumor (pT):      pT1c  Regional Lymph Nodes (pN):      pN0  ADDITIONAL FINDINGS  Additional Findings:      Inflammation (type): Patchy chronic interstitial  inflammation       Emphysema        Respiratory bronchiolitis; recent hemorrhage  ADDITIONAL TESTING  REPRESENTATIVE BLOCKS:      Normal Block: G8       Tumor Block: A3       Imaging:    No results found.     Pulmonary Functions Testing Results:    No results found for: \"FEV1\", \"FVC\", \"IJN7QIW\", \"TLC\", \"DLCO\"    CT Chest was personally reviewed     Assessment/Plan   Problem List " Items Addressed This Visit             ICD-10-CM    Malignant neoplasm of upper lobe of left lung (Multi) C34.12    Relevant Orders    NM PET CT lung CA staging    Basic Metabolic Panel    CBC    Protime-INR    Lung nodule R91.1    Relevant Orders    NM PET CT lung CA staging    Basic Metabolic Panel    CBC    Protime-INR         Brianne Mallory is a 79 y.o. female with a 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 with completion lobectomy on 1/21/21 with Dr. Martinez for a pT1cN0 NSCLC. Surveillance imaging today showed a new 2.3 cm right upper lobe spiculated mass which is concerning in appearance. I d/w daughter and patient - I would like a PET scan and plan for Dennis bronch/EBUS. Patient is pretty sure she would NOT want surgery if this turned out to be a malignancy; will therefore hold off on PFTs for now. I will call them once all results are finalized.     Gissell Pacheco, DO  Thoracic & Esophageal Surgery

## 2024-05-16 ENCOUNTER — HOSPITAL ENCOUNTER (OUTPATIENT)
Dept: RADIOLOGY | Facility: CLINIC | Age: 79
Discharge: HOME | End: 2024-05-16
Payer: COMMERCIAL

## 2024-05-16 DIAGNOSIS — C34.12 MALIGNANT NEOPLASM OF UPPER LOBE OF LEFT LUNG (MULTI): ICD-10-CM

## 2024-05-16 DIAGNOSIS — R91.1 LUNG NODULE: ICD-10-CM

## 2024-05-16 PROCEDURE — A9552 F18 FDG: HCPCS | Performed by: STUDENT IN AN ORGANIZED HEALTH CARE EDUCATION/TRAINING PROGRAM

## 2024-05-16 PROCEDURE — 3430000001 HC RX 343 DIAGNOSTIC RADIOPHARMACEUTICALS: Performed by: STUDENT IN AN ORGANIZED HEALTH CARE EDUCATION/TRAINING PROGRAM

## 2024-05-16 PROCEDURE — 78816 PET IMAGE W/CT FULL BODY: CPT | Mod: PET TUMOR INIT TX STRAT | Performed by: NUCLEAR MEDICINE

## 2024-05-16 PROCEDURE — 78815 PET IMAGE W/CT SKULL-THIGH: CPT | Mod: PI

## 2024-05-16 RX ORDER — FLUDEOXYGLUCOSE F 18 200 MCI/ML
13.5 INJECTION, SOLUTION INTRAVENOUS
Status: COMPLETED | OUTPATIENT
Start: 2024-05-16 | End: 2024-05-16

## 2024-05-16 RX ADMIN — FLUDEOXYGLUCOSE F 18 13.5 MILLICURIE: 200 INJECTION, SOLUTION INTRAVENOUS at 08:30

## 2024-05-28 NOTE — H&P (VIEW-ONLY)
Patient: Brianne Mallory    [id number]  : 1945 -- AGE 79 y.o.    Provider: AYLIN Camacho       Location [facility]   Service Date: 6/3/2024            [facility]  New Visit Note    Virtual or Telephone Consent  A telephone visit (audio only) between the patient (at the originating site) and the provider (at the distant site) was utilized to provide this telehealth service.   Verbal consent was requested and obtained from Brianne Mallory on this date, 24 for a telehealth visit.         HISTORY OF PRESENT ILLNESS     The patient's referring provider is: No ref. provider found    HISTORY OF PRESENT ILLNESS   Brianne Mallory is a 79 y.o. female who presents to a [facility] for an evaluation with concerns of new RUL nodule she has a h/o Lt upper lobe pT1cN0 NSCLC s/p lobectomy by Dr. Martinez on 21, needs EBUS for diagnosis. . I have independently interviewed and examined the patient in the office and reviewed available records.    Current History  Brianne Mallory is a 79 y.o. female h/o Lt upper lobe pT1cN0 NSCLC s/p lobectomy by Dr. Martinez on 21.      At baseline,  has dyspnea on exertion, but none at rest. Symptoms started many years ago, but has mostly been stable.  They are active in her everyday life and carries loads and does strenuous exercise. He is only troubled by breathlessness except on strenuous exercise (mMRC 0).  Denies orthopnea, pnd, or renu.   Weight has been mostly stable.  Denies  chronic cough, a little wheezing, and clear, green, blood streaks no sputum. No night cough. No hemoptysis. No fever or shivering chills.  Has no runny nose, or a tingling sensation in the back of his throat.  Denies chest pain or heartburn.     Previous pulmonary history:   previously was told they have COPD  .  currently is not on supplemental oxygen.  Never been to pulmonary rehab. Does not recall having  AECOPD requiring antibiotics or prednisone.    Inhalers/nebulized medications: none     Hospitalization History: Not been hospitalized over the last year for breathing related problem.    Sleep history:  Complains of snoring, denies apnea, denies feeling tired during the day, and taking naps during the day.       ALLERGIES AND MEDICATIONS     ALLERGIES  Allergies   Allergen Reactions    Aspirin Hives    Ibuprofen Hives    Penicillins Rash     mild; occurred approx 20 years ago       MEDICATIONS  Current Outpatient Medications   Medication Sig Dispense Refill    cholecalciferol (Vitamin D-3) 125 MCG (5000 UT) capsule Daily      cyanocobalamin (Vitamin B-12) 1,000 mcg tablet Take 1 tablet (1,000 mcg) by mouth once daily.      levothyroxine (Synthroid, Levoxyl) 88 mcg tablet Take 1 tablet (88 mcg) by mouth early in the morning..      sertraline (Zoloft) 25 mg tablet Take 1 tablet (25 mg) by mouth at 3:00 in the morning.      simvastatin (Zocor) 40 mg tablet Take 1 tablet (40 mg) by mouth once daily at bedtime.       No current facility-administered medications for this visit.         PAST HISTORY     PAST MEDICAL HISTORY  She  has no past medical history on file.  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 Martinez for a pT1cN0 NSCLC.   Hypothyroidism  B 12/ vitamin D deficiency     PAST SURGICAL HISTORY  Past Surgical History:   Procedure Laterality Date    OTHER SURGICAL HISTORY  12/28/2020    Bronchoscopy    OTHER SURGICAL HISTORY  12/28/2020    Biopsy    OTHER SURGICAL HISTORY  04/07/2022    Knee replacement    OTHER SURGICAL HISTORY Left 01/21/2021    GERTRUDE Lung lobectomy       IMMUNIZATION HISTORY  Immunization History   Administered Date(s) Administered    Flu vaccine (IIV4), preservative free *Check age/dose* 10/23/2015, 08/27/2020    Flu vaccine, quadrivalent, high-dose, preservative free, age 65y+ (FLUZONE) 10/25/2022, 12/11/2023    Influenza,  Seasonal, Quadrivalent, Adjuvanted 10/16/2021    Influenza, seasonal, injectable 10/01/2017, 10/01/2020    Influenza, seasonal, injectable, preservative free 10/04/2011, 09/21/2012    Influenza, trivalent, adjuvanted 11/06/2017, 09/24/2018, 09/10/2019    Moderna COVID-19 vaccine, Fall 2023, 12 yeasrs and older (50mcg/0.5mL) 01/20/2024    Moderna COVID-19 vaccine, bivalent, blue cap/gray label *Check age/dose* 10/25/2022    Moderna SARS-CoV-2 Vaccination 02/12/2021, 03/15/2021, 10/30/2021, 05/01/2022    Pneumococcal conjugate vaccine, 13-valent (PREVNAR 13) 02/01/2015, 10/23/2015, 10/01/2020    Pneumococcal polysaccharide vaccine, 23-valent, age 2 years and older (PNEUMOVAX 23) 09/21/2012, 11/06/2017    RSV, 60 Years And Older (AREXVY) 03/01/2024    Zoster vaccine, recombinant, adult (SHINGRIX) 02/06/2018, 03/01/2019    Zoster, live 10/01/2020       SOCIAL HISTORY  She  reports that she has quit smoking. Her smoking use included cigarettes. She started smoking about 5 years ago. She has a 5.1 pack-year smoking history. She has never used smokeless tobacco. She reports that she does not currently use alcohol. She reports that she does not use drugs. She Patient  smoked 1 ppd x 30 years stopped 4 years ago     OCCUPATIONAL/ENVIRONMENTAL HISTORY  Previously worked as: Previously worked as a  and in retail   DOES/DOES NOT EC: does not have known exposure to asbestos, silica, beryllium or inhaled metals.  DOES/DOES NOT EC: does not have exposure to birds or exotic animals.    FAMILY HISTORY  No family history on file.  DOES/DOES NOT EC: does have a family history of pulmonary disease. Maternal grandmother lung cancer   DOES/DOES NOT EC: does have a family history of cancer. Daughter with breast cancer   Son with thymus cancer   DOES/DOES NOT EC: does not have a family history of autoimmune disorders.    RESULTS/DATA     Pulmonary Function Test Results     No testing done     Chest Radiograph     CHEST 2 VIEW  02/12/2021    Narrative  MRN: [id number]  Patient Name: JANELL MANJARREZ    STUDY:  TH CHEST 2 VIEW PA AND LAT;  2/12/2021 10:11 am    INDICATION:  post-op; poss. pneumostat removal.    COMPARISON:  Chest radiograph dated 02/05/2021..    FINDINGS:  A left chest tube is visualized with the tip projecting over the left  upper lobe.    CARDIOMEDIASTINAL SILHOUETTE:  Cardiomediastinal silhouette is normal in size and configuration.    LUNGS:  Seen again is an are postoperative changes from a left upper  lobectomy with persistent elevation of the left hemidiaphragm and a  small left-sided pleural effusion. There is a persistent small  left-sided pneumothorax seen on the lateral exam. Interval  improvement in lung aeration.    ABDOMEN:  No remarkable upper abdominal findings.    BONES:  No acute osseous abnormality.    Impression  1. Similar appearance postoperative changes from a left upper  lobectomy with persistent elevation of the left hemidiaphragm and a  small left sided pleural effusion.  2. There is been interval improvement in the left-sided pneumothorax.      Chest CT Scan   CT chest 4/30/2024      INDICATION:  History of left upper lobe non-small cell lung cancer status post  left upper lobectomy, presenting for surveillance CT.      COMPARISON:  CT chest dated 04/11/2023, 08/06/2021    FINDINGS:  LUNGS AND AIRWAYS:  Postsurgical changes of left upper lobectomy are again noted, without  nodularity on the suture line to suggest local recurrence.      New spiculated 2.4 x 2.2 cm nodule in the right upper lobe (series  202, image 108). A few previously seen punctate nodules for example  in the right upper lobe on series 202, image 73 are unchanged.      Background of mild centrilobular and paraseptal emphysematous changes  and mild bibasilar atelectatic changes. Right fat containing  diaphragmatic hernias are again noted. No focal consolidation,  pleural effusion, or pneumothorax.      MEDIASTINUM AND YESENIA, LOWER  NECK AND AXILLA:  No thyroid parenchyma could be confidently seen within scanned lower  neck. No evidence of thoracic lymphadenopathy by CT criteria.  Esophagus appears within normal limits as seen.      HEART AND VESSELS:  The thoracic aorta normal in course and caliber.There is moderate  calcified atherosclerosis present. Main pulmonary artery and its  branches are normal in caliber. Moderate coronary artery  calcifications are seen. Please note,the study is not optimized for  evaluation of coronary arteries. The cardiac chambers are not  enlarged. There is no pericardial effusion seen.      UPPER ABDOMEN:  Cholelithiasis without evidence of acute cholecystitis. Stable  subcentimeter hypoattenuating lesions in the liver which are too  small to characterize.      CHEST WALL AND OSSEOUS STRUCTURES:  Chest wall is within normal limits.  No acute osseous pathology.There are no suspicious osseous  lesions.Extensive multilevel degenerative changes within visualized  spine.      IMPRESSION:  1. New 2.4 x 2.2 cm spiculated right upper lobe nodule highly  suspicious for malignancy. Further assessment with PET-CT and tissue  sampling is recommended.  2. Stable postsurgical changes of left upper lobectomy.  3. Mild emphysema.    NM PET CT LUNG CA STAGING;  5/16/2024       INDICATION:  Signs/Symptoms:new lung nodule concerning for recurrence..      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 Was found to have a new  lung mass      COMPARISON:  CT 04/29/2024 and 04/11/2023      FINDINGS:  NECK:      No focal hypermetabolic soft tissue lesion is seen in the neck.  No hypermetabolic cervical lymphadenopathy is present.          CHEST:  Previously described spiculated nodule in the right upper lobe  measures 2.7 x 2.5 cm, slightly increased from 2.4 x 2.2 cm  04/29/2024. There is associated intense hypermetabolic activity with  SUV max of 14.7  2 hypermetabolic  station 4a lymph nodes are noted with SUV max of 4.  There is an additional mildly hypermetabolic subcentimeter station 4R  paratracheal lymph node more superiorly with SUV max of 2.4.  Postsurgical changes of left upper lobectomy without evidence of  hypermetabolic activity along the suture line to suggest locoregional  recurrence.          ABDOMEN AND PELVIS:  No hypermetabolic soft tissue lesion is present in the abdomen and  pelvis. No evidence of hypermetabolic lymphadenopathy.  Physiologic radiotracer uptake is present in the liver and spleen  with excretion into the bowel loops and the genitourinary tract.  Cholelithiasis. No discernible gallbladder wall thickening,  pericholecystic fluid, stranding. A defect is noted in the  posteromedial left hemidiaphragm with herniation of the gastric  cardia into the intrathoracic cavity.      MUSCULOSKELETAL/EXTREMITIES:  No focal hypermetabolic lesion is seen in the axial or appendicular  to suggest osseous metastasis.      IMPRESSION:  1. Previously described spiculated nodule in the right upper lobe  demonstrates intense hypermetabolic activity and is most compatible  with malignancy. Intensely hypermetabolic station 4R lymph nodes are  most consistent with metastatic disease.  2. Postsurgical changes of left upper lobectomy without evidence of  abnormal hypermetabolic activity along the resection site to indicate  recurrent disease.  3. No evidence of FDG avid distant metastatic disease.          Echocardiogram     No testing done          REVIEW OF SYSTEMS     REVIEW OF SYSTEMS  Review of Systems   HENT:  Positive for hearing loss.    All other systems reviewed and are negative.        PHYSICAL EXAM     VITAL SIGNS: There were no vitals taken for this visit.     CURRENT WEIGHT: [unfilled]  BMI: [unfilled]  PREVIOUS WEIGHTS:  Wt Readings from Last 3 Encounters:   04/07/22 98 kg (216 lb)   08/06/21 94.9 kg (209 lb 3 oz)   02/12/21 88.6 kg (195 lb 7 oz)       Physical  Exam  Constitutional:       Comments: Clear voice    Pulmonary:      Effort: Pulmonary effort is normal.   Neurological:      General: No focal deficit present.      Mental Status: She is alert and oriented to person, place, and time. Mental status is at baseline.   Psychiatric:         Mood and Affect: Mood normal.         Behavior: Behavior normal.         Thought Content: Thought content normal.         Judgment: Judgment normal.         ASSESSMENT/PLAN     Ms. Mallory is a 79 y.o. female and  has no past medical history on file. She was referred to the [facility] for evaluation of 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 with completion lobectomy on 1/21/21 with Dr. Martinez for a pT1cN0 NSCLC. Surveillance imaging today showed a new 2.3 cm right upper lobe spiculated mass which is concerning in appearance.     Problem List and Orders      Assessment and Plan / Recommendations:  Problem List Items Addressed This Visit    None        Patient's visit was converted to a virtual visit.    1. RUL nodule  - Plan for bronchoscopy with biopsy   - Lab work prior to procedure - done PCP  - Not on any anticoagulation     I explained the procedure to the patient. We discussed that the bronchoscopy will be performed by a member of the Interventional Pulmonary Team; depending on scheduling and provider availability. We also discussed that the IP providers function as a team and not infrequently may have to fill in for one another if there are emergent issues that need attention at the same time. The patient / family expressed understanding and agreed to proceed. All questions were answered.     Patient's visit was converted to a virtual visit. Spoke with the patient via phone for 16 minutes.    Prep: 10 minutes  Phone: 16 minutes  Total: 26 minutes      Thank you for visiting the Pulmonary clinic today!     Molly Arango CNP  My office number is [phone] -      Best way to get a hold of me is to call my office --> Please do not send me follow my health messages  Any test results will be discussed at next visit -- please make sure to make a follow up appt after testing.

## 2024-05-28 NOTE — PROGRESS NOTES
Patient: Brianne Mallory    [id number]  : 1945 -- AGE 79 y.o.    Provider: AYLIN Camacho       Location [facility]   Service Date: 6/3/2024            [facility]  New Visit Note    Virtual or Telephone Consent  A telephone visit (audio only) between the patient (at the originating site) and the provider (at the distant site) was utilized to provide this telehealth service.   Verbal consent was requested and obtained from Brianne Mallory on this date, 24 for a telehealth visit.         HISTORY OF PRESENT ILLNESS     The patient's referring provider is: No ref. provider found    HISTORY OF PRESENT ILLNESS   Brianne Mallory is a 79 y.o. female who presents to a [facility] for an evaluation with concerns of new RUL nodule she has a h/o Lt upper lobe pT1cN0 NSCLC s/p lobectomy by Dr. Martinez on 21, needs EBUS for diagnosis. . I have independently interviewed and examined the patient in the office and reviewed available records.    Current History  Brianne Mallory is a 79 y.o. female h/o Lt upper lobe pT1cN0 NSCLC s/p lobectomy by Dr. Martinez on 21.      At baseline,  has dyspnea on exertion, but none at rest. Symptoms started many years ago, but has mostly been stable.  They are active in her everyday life and carries loads and does strenuous exercise. He is only troubled by breathlessness except on strenuous exercise (mMRC 0).  Denies orthopnea, pnd, or renu.   Weight has been mostly stable.  Denies  chronic cough, a little wheezing, and clear, green, blood streaks no sputum. No night cough. No hemoptysis. No fever or shivering chills.  Has no runny nose, or a tingling sensation in the back of his throat.  Denies chest pain or heartburn.     Previous pulmonary history:   previously was told they have COPD  .  currently is not on supplemental oxygen.  Never been to pulmonary rehab. Does not recall having  AECOPD requiring antibiotics or prednisone.    Inhalers/nebulized medications: none     Hospitalization History: Not been hospitalized over the last year for breathing related problem.    Sleep history:  Complains of snoring, denies apnea, denies feeling tired during the day, and taking naps during the day.       ALLERGIES AND MEDICATIONS     ALLERGIES  Allergies   Allergen Reactions    Aspirin Hives    Ibuprofen Hives    Penicillins Rash     mild; occurred approx 20 years ago       MEDICATIONS  Current Outpatient Medications   Medication Sig Dispense Refill    cholecalciferol (Vitamin D-3) 125 MCG (5000 UT) capsule Daily      cyanocobalamin (Vitamin B-12) 1,000 mcg tablet Take 1 tablet (1,000 mcg) by mouth once daily.      levothyroxine (Synthroid, Levoxyl) 88 mcg tablet Take 1 tablet (88 mcg) by mouth early in the morning..      sertraline (Zoloft) 25 mg tablet Take 1 tablet (25 mg) by mouth at 3:00 in the morning.      simvastatin (Zocor) 40 mg tablet Take 1 tablet (40 mg) by mouth once daily at bedtime.       No current facility-administered medications for this visit.         PAST HISTORY     PAST MEDICAL HISTORY  She  has no past medical history on file.  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 Martinez for a pT1cN0 NSCLC.   Hypothyroidism  B 12/ vitamin D deficiency     PAST SURGICAL HISTORY  Past Surgical History:   Procedure Laterality Date    OTHER SURGICAL HISTORY  12/28/2020    Bronchoscopy    OTHER SURGICAL HISTORY  12/28/2020    Biopsy    OTHER SURGICAL HISTORY  04/07/2022    Knee replacement    OTHER SURGICAL HISTORY Left 01/21/2021    GERTRUDE Lung lobectomy       IMMUNIZATION HISTORY  Immunization History   Administered Date(s) Administered    Flu vaccine (IIV4), preservative free *Check age/dose* 10/23/2015, 08/27/2020    Flu vaccine, quadrivalent, high-dose, preservative free, age 65y+ (FLUZONE) 10/25/2022, 12/11/2023    Influenza,  Seasonal, Quadrivalent, Adjuvanted 10/16/2021    Influenza, seasonal, injectable 10/01/2017, 10/01/2020    Influenza, seasonal, injectable, preservative free 10/04/2011, 09/21/2012    Influenza, trivalent, adjuvanted 11/06/2017, 09/24/2018, 09/10/2019    Moderna COVID-19 vaccine, Fall 2023, 12 yeasrs and older (50mcg/0.5mL) 01/20/2024    Moderna COVID-19 vaccine, bivalent, blue cap/gray label *Check age/dose* 10/25/2022    Moderna SARS-CoV-2 Vaccination 02/12/2021, 03/15/2021, 10/30/2021, 05/01/2022    Pneumococcal conjugate vaccine, 13-valent (PREVNAR 13) 02/01/2015, 10/23/2015, 10/01/2020    Pneumococcal polysaccharide vaccine, 23-valent, age 2 years and older (PNEUMOVAX 23) 09/21/2012, 11/06/2017    RSV, 60 Years And Older (AREXVY) 03/01/2024    Zoster vaccine, recombinant, adult (SHINGRIX) 02/06/2018, 03/01/2019    Zoster, live 10/01/2020       SOCIAL HISTORY  She  reports that she has quit smoking. Her smoking use included cigarettes. She started smoking about 5 years ago. She has a 5.1 pack-year smoking history. She has never used smokeless tobacco. She reports that she does not currently use alcohol. She reports that she does not use drugs. She Patient  smoked 1 ppd x 30 years stopped 4 years ago     OCCUPATIONAL/ENVIRONMENTAL HISTORY  Previously worked as: Previously worked as a  and in retail   DOES/DOES NOT EC: does not have known exposure to asbestos, silica, beryllium or inhaled metals.  DOES/DOES NOT EC: does not have exposure to birds or exotic animals.    FAMILY HISTORY  No family history on file.  DOES/DOES NOT EC: does have a family history of pulmonary disease. Maternal grandmother lung cancer   DOES/DOES NOT EC: does have a family history of cancer. Daughter with breast cancer   Son with thymus cancer   DOES/DOES NOT EC: does not have a family history of autoimmune disorders.    RESULTS/DATA     Pulmonary Function Test Results     No testing done     Chest Radiograph     CHEST 2 VIEW  02/12/2021    Narrative  MRN: [id number]  Patient Name: JANELL MANJARREZ    STUDY:  TH CHEST 2 VIEW PA AND LAT;  2/12/2021 10:11 am    INDICATION:  post-op; poss. pneumostat removal.    COMPARISON:  Chest radiograph dated 02/05/2021..    FINDINGS:  A left chest tube is visualized with the tip projecting over the left  upper lobe.    CARDIOMEDIASTINAL SILHOUETTE:  Cardiomediastinal silhouette is normal in size and configuration.    LUNGS:  Seen again is an are postoperative changes from a left upper  lobectomy with persistent elevation of the left hemidiaphragm and a  small left-sided pleural effusion. There is a persistent small  left-sided pneumothorax seen on the lateral exam. Interval  improvement in lung aeration.    ABDOMEN:  No remarkable upper abdominal findings.    BONES:  No acute osseous abnormality.    Impression  1. Similar appearance postoperative changes from a left upper  lobectomy with persistent elevation of the left hemidiaphragm and a  small left sided pleural effusion.  2. There is been interval improvement in the left-sided pneumothorax.      Chest CT Scan   CT chest 4/30/2024      INDICATION:  History of left upper lobe non-small cell lung cancer status post  left upper lobectomy, presenting for surveillance CT.      COMPARISON:  CT chest dated 04/11/2023, 08/06/2021    FINDINGS:  LUNGS AND AIRWAYS:  Postsurgical changes of left upper lobectomy are again noted, without  nodularity on the suture line to suggest local recurrence.      New spiculated 2.4 x 2.2 cm nodule in the right upper lobe (series  202, image 108). A few previously seen punctate nodules for example  in the right upper lobe on series 202, image 73 are unchanged.      Background of mild centrilobular and paraseptal emphysematous changes  and mild bibasilar atelectatic changes. Right fat containing  diaphragmatic hernias are again noted. No focal consolidation,  pleural effusion, or pneumothorax.      MEDIASTINUM AND YESENIA, LOWER  NECK AND AXILLA:  No thyroid parenchyma could be confidently seen within scanned lower  neck. No evidence of thoracic lymphadenopathy by CT criteria.  Esophagus appears within normal limits as seen.      HEART AND VESSELS:  The thoracic aorta normal in course and caliber.There is moderate  calcified atherosclerosis present. Main pulmonary artery and its  branches are normal in caliber. Moderate coronary artery  calcifications are seen. Please note,the study is not optimized for  evaluation of coronary arteries. The cardiac chambers are not  enlarged. There is no pericardial effusion seen.      UPPER ABDOMEN:  Cholelithiasis without evidence of acute cholecystitis. Stable  subcentimeter hypoattenuating lesions in the liver which are too  small to characterize.      CHEST WALL AND OSSEOUS STRUCTURES:  Chest wall is within normal limits.  No acute osseous pathology.There are no suspicious osseous  lesions.Extensive multilevel degenerative changes within visualized  spine.      IMPRESSION:  1. New 2.4 x 2.2 cm spiculated right upper lobe nodule highly  suspicious for malignancy. Further assessment with PET-CT and tissue  sampling is recommended.  2. Stable postsurgical changes of left upper lobectomy.  3. Mild emphysema.    NM PET CT LUNG CA STAGING;  5/16/2024       INDICATION:  Signs/Symptoms:new lung nodule concerning for recurrence..      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 Was found to have a new  lung mass      COMPARISON:  CT 04/29/2024 and 04/11/2023      FINDINGS:  NECK:      No focal hypermetabolic soft tissue lesion is seen in the neck.  No hypermetabolic cervical lymphadenopathy is present.          CHEST:  Previously described spiculated nodule in the right upper lobe  measures 2.7 x 2.5 cm, slightly increased from 2.4 x 2.2 cm  04/29/2024. There is associated intense hypermetabolic activity with  SUV max of 14.7  2 hypermetabolic  station 4a lymph nodes are noted with SUV max of 4.  There is an additional mildly hypermetabolic subcentimeter station 4R  paratracheal lymph node more superiorly with SUV max of 2.4.  Postsurgical changes of left upper lobectomy without evidence of  hypermetabolic activity along the suture line to suggest locoregional  recurrence.          ABDOMEN AND PELVIS:  No hypermetabolic soft tissue lesion is present in the abdomen and  pelvis. No evidence of hypermetabolic lymphadenopathy.  Physiologic radiotracer uptake is present in the liver and spleen  with excretion into the bowel loops and the genitourinary tract.  Cholelithiasis. No discernible gallbladder wall thickening,  pericholecystic fluid, stranding. A defect is noted in the  posteromedial left hemidiaphragm with herniation of the gastric  cardia into the intrathoracic cavity.      MUSCULOSKELETAL/EXTREMITIES:  No focal hypermetabolic lesion is seen in the axial or appendicular  to suggest osseous metastasis.      IMPRESSION:  1. Previously described spiculated nodule in the right upper lobe  demonstrates intense hypermetabolic activity and is most compatible  with malignancy. Intensely hypermetabolic station 4R lymph nodes are  most consistent with metastatic disease.  2. Postsurgical changes of left upper lobectomy without evidence of  abnormal hypermetabolic activity along the resection site to indicate  recurrent disease.  3. No evidence of FDG avid distant metastatic disease.          Echocardiogram     No testing done          REVIEW OF SYSTEMS     REVIEW OF SYSTEMS  Review of Systems   HENT:  Positive for hearing loss.    All other systems reviewed and are negative.        PHYSICAL EXAM     VITAL SIGNS: There were no vitals taken for this visit.     CURRENT WEIGHT: [unfilled]  BMI: [unfilled]  PREVIOUS WEIGHTS:  Wt Readings from Last 3 Encounters:   04/07/22 98 kg (216 lb)   08/06/21 94.9 kg (209 lb 3 oz)   02/12/21 88.6 kg (195 lb 7 oz)       Physical  Exam  Constitutional:       Comments: Clear voice    Pulmonary:      Effort: Pulmonary effort is normal.   Neurological:      General: No focal deficit present.      Mental Status: She is alert and oriented to person, place, and time. Mental status is at baseline.   Psychiatric:         Mood and Affect: Mood normal.         Behavior: Behavior normal.         Thought Content: Thought content normal.         Judgment: Judgment normal.         ASSESSMENT/PLAN     Ms. Mallory is a 79 y.o. female and  has no past medical history on file. She was referred to the [facility] for evaluation of 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 with completion lobectomy on 1/21/21 with Dr. Martinez for a pT1cN0 NSCLC. Surveillance imaging today showed a new 2.3 cm right upper lobe spiculated mass which is concerning in appearance.     Problem List and Orders      Assessment and Plan / Recommendations:  Problem List Items Addressed This Visit    None        Patient's visit was converted to a virtual visit.    1. RUL nodule  - Plan for bronchoscopy with biopsy   - Lab work prior to procedure - done PCP  - Not on any anticoagulation     I explained the procedure to the patient. We discussed that the bronchoscopy will be performed by a member of the Interventional Pulmonary Team; depending on scheduling and provider availability. We also discussed that the IP providers function as a team and not infrequently may have to fill in for one another if there are emergent issues that need attention at the same time. The patient / family expressed understanding and agreed to proceed. All questions were answered.     Patient's visit was converted to a virtual visit. Spoke with the patient via phone for 16 minutes.    Prep: 10 minutes  Phone: 16 minutes  Total: 26 minutes      Thank you for visiting the Pulmonary clinic today!     Molly Arango CNP  My office number is [phone] -      Best way to get a hold of me is to call my office --> Please do not send me follow my health messages  Any test results will be discussed at next visit -- please make sure to make a follow up appt after testing.

## 2024-06-03 ENCOUNTER — TELEMEDICINE (OUTPATIENT)
Dept: PULMONOLOGY | Facility: CLINIC | Age: 79
End: 2024-06-03
Payer: COMMERCIAL

## 2024-06-03 DIAGNOSIS — R91.1 LUNG NODULE: ICD-10-CM

## 2024-06-03 DIAGNOSIS — Z01.811 PREOP PULMONARY/RESPIRATORY EXAM: ICD-10-CM

## 2024-06-03 DIAGNOSIS — C34.12 MALIGNANT NEOPLASM OF UPPER LOBE OF LEFT LUNG (MULTI): Primary | ICD-10-CM

## 2024-06-03 PROCEDURE — 1159F MED LIST DOCD IN RCRD: CPT | Performed by: NURSE PRACTITIONER

## 2024-06-03 PROCEDURE — 1036F TOBACCO NON-USER: CPT | Performed by: NURSE PRACTITIONER

## 2024-06-03 PROCEDURE — 1126F AMNT PAIN NOTED NONE PRSNT: CPT | Performed by: NURSE PRACTITIONER

## 2024-06-03 PROCEDURE — 99213 OFFICE O/P EST LOW 20 MIN: CPT | Performed by: NURSE PRACTITIONER

## 2024-06-03 RX ORDER — LANOLIN ALCOHOL/MO/W.PET/CERES
1000 CREAM (GRAM) TOPICAL DAILY
COMMUNITY
Start: 2024-05-24 | End: 2024-08-22

## 2024-06-03 RX ORDER — SIMVASTATIN 40 MG/1
1 TABLET, FILM COATED ORAL NIGHTLY
COMMUNITY
Start: 2020-12-06

## 2024-06-03 RX ORDER — LEVOTHYROXINE SODIUM 88 UG/1
1 TABLET ORAL DAILY
COMMUNITY
Start: 2020-12-06

## 2024-06-03 RX ORDER — CHOLECALCIFEROL (VITAMIN D3) 125 MCG
CAPSULE ORAL
COMMUNITY
Start: 2024-05-29

## 2024-06-03 RX ORDER — SERTRALINE HYDROCHLORIDE 25 MG/1
25 TABLET, FILM COATED ORAL NIGHTLY
COMMUNITY
Start: 2024-01-15

## 2024-06-03 ASSESSMENT — ENCOUNTER SYMPTOMS
DEPRESSION: 0
LOSS OF SENSATION IN FEET: 0
OCCASIONAL FEELINGS OF UNSTEADINESS: 0

## 2024-06-03 ASSESSMENT — PAIN SCALES - GENERAL: PAINLEVEL: 0-NO PAIN

## 2024-06-03 ASSESSMENT — PATIENT HEALTH QUESTIONNAIRE - PHQ9
1. LITTLE INTEREST OR PLEASURE IN DOING THINGS: NOT AT ALL
SUM OF ALL RESPONSES TO PHQ9 QUESTIONS 1 AND 2: 0
2. FEELING DOWN, DEPRESSED OR HOPELESS: NOT AT ALL

## 2024-06-10 ENCOUNTER — HOSPITAL ENCOUNTER (OUTPATIENT)
Dept: GASTROENTEROLOGY | Facility: HOSPITAL | Age: 79
Discharge: HOME | End: 2024-06-10
Payer: COMMERCIAL

## 2024-06-10 ENCOUNTER — ANESTHESIA EVENT (OUTPATIENT)
Dept: GASTROENTEROLOGY | Facility: HOSPITAL | Age: 79
End: 2024-06-10
Payer: COMMERCIAL

## 2024-06-10 ENCOUNTER — HOSPITAL ENCOUNTER (OUTPATIENT)
Dept: RADIOLOGY | Facility: HOSPITAL | Age: 79
Discharge: HOME | End: 2024-06-10
Payer: COMMERCIAL

## 2024-06-10 ENCOUNTER — ANESTHESIA (OUTPATIENT)
Dept: GASTROENTEROLOGY | Facility: HOSPITAL | Age: 79
End: 2024-06-10
Payer: COMMERCIAL

## 2024-06-10 VITALS
BODY MASS INDEX: 31.83 KG/M2 | SYSTOLIC BLOOD PRESSURE: 166 MMHG | OXYGEN SATURATION: 95 % | WEIGHT: 210 LBS | RESPIRATION RATE: 17 BRPM | HEIGHT: 68 IN | DIASTOLIC BLOOD PRESSURE: 74 MMHG | HEART RATE: 76 BPM | TEMPERATURE: 97.5 F

## 2024-06-10 DIAGNOSIS — R91.1 LUNG NODULE: ICD-10-CM

## 2024-06-10 DIAGNOSIS — C34.12 MALIGNANT NEOPLASM OF UPPER LOBE OF LEFT LUNG (MULTI): ICD-10-CM

## 2024-06-10 PROCEDURE — 3700000002 HC GENERAL ANESTHESIA TIME - EACH INCREMENTAL 1 MINUTE

## 2024-06-10 PROCEDURE — 88305 TISSUE EXAM BY PATHOLOGIST: CPT | Mod: TC,91,MCY | Performed by: INTERNAL MEDICINE

## 2024-06-10 PROCEDURE — 7100000001 HC RECOVERY ROOM TIME - INITIAL BASE CHARGE

## 2024-06-10 PROCEDURE — 71250 CT THORAX DX C-: CPT | Performed by: RADIOLOGY

## 2024-06-10 PROCEDURE — 2500000005 HC RX 250 GENERAL PHARMACY W/O HCPCS

## 2024-06-10 PROCEDURE — 7100000010 HC PHASE TWO TIME - EACH INCREMENTAL 1 MINUTE

## 2024-06-10 PROCEDURE — 71250 CT THORAX DX C-: CPT

## 2024-06-10 PROCEDURE — 2720000007 HC OR 272 NO HCPCS

## 2024-06-10 PROCEDURE — 88173 CYTOPATH EVAL FNA REPORT: CPT | Mod: TC,91,MCY,59 | Performed by: INTERNAL MEDICINE

## 2024-06-10 PROCEDURE — 7100000002 HC RECOVERY ROOM TIME - EACH INCREMENTAL 1 MINUTE

## 2024-06-10 PROCEDURE — 88305 TISSUE EXAM BY PATHOLOGIST: CPT | Mod: TC,59,SUR | Performed by: INTERNAL MEDICINE

## 2024-06-10 PROCEDURE — 31627 NAVIGATIONAL BRONCHOSCOPY: CPT | Performed by: INTERNAL MEDICINE

## 2024-06-10 PROCEDURE — 2500000005 HC RX 250 GENERAL PHARMACY W/O HCPCS: Performed by: ANESTHESIOLOGY

## 2024-06-10 PROCEDURE — 7100000009 HC PHASE TWO TIME - INITIAL BASE CHARGE

## 2024-06-10 PROCEDURE — 2500000004 HC RX 250 GENERAL PHARMACY W/ HCPCS (ALT 636 FOR OP/ED)

## 2024-06-10 PROCEDURE — 3700000001 HC GENERAL ANESTHESIA TIME - INITIAL BASE CHARGE

## 2024-06-10 RX ORDER — PROPOFOL 10 MG/ML
INJECTION, EMULSION INTRAVENOUS AS NEEDED
Status: DISCONTINUED | OUTPATIENT
Start: 2024-06-10 | End: 2024-06-10

## 2024-06-10 RX ORDER — NORETHINDRONE AND ETHINYL ESTRADIOL 0.5-0.035
KIT ORAL AS NEEDED
Status: DISCONTINUED | OUTPATIENT
Start: 2024-06-10 | End: 2024-06-10

## 2024-06-10 RX ORDER — SODIUM CHLORIDE, SODIUM LACTATE, POTASSIUM CHLORIDE, CALCIUM CHLORIDE 600; 310; 30; 20 MG/100ML; MG/100ML; MG/100ML; MG/100ML
100 INJECTION, SOLUTION INTRAVENOUS CONTINUOUS
OUTPATIENT
Start: 2024-06-10

## 2024-06-10 RX ORDER — PHENYLEPHRINE HCL IN 0.9% NACL 0.4MG/10ML
SYRINGE (ML) INTRAVENOUS AS NEEDED
Status: DISCONTINUED | OUTPATIENT
Start: 2024-06-10 | End: 2024-06-10

## 2024-06-10 RX ORDER — ONDANSETRON HYDROCHLORIDE 2 MG/ML
4 INJECTION, SOLUTION INTRAVENOUS ONCE AS NEEDED
OUTPATIENT
Start: 2024-06-10

## 2024-06-10 RX ORDER — PROPOFOL 10 MG/ML
INJECTION, EMULSION INTRAVENOUS CONTINUOUS PRN
Status: DISCONTINUED | OUTPATIENT
Start: 2024-06-10 | End: 2024-06-10

## 2024-06-10 RX ORDER — LIDOCAINE HYDROCHLORIDE 20 MG/ML
INJECTION, SOLUTION INFILTRATION; PERINEURAL AS NEEDED
Status: DISCONTINUED | OUTPATIENT
Start: 2024-06-10 | End: 2024-06-10

## 2024-06-10 RX ORDER — ROCURONIUM BROMIDE 10 MG/ML
INJECTION, SOLUTION INTRAVENOUS AS NEEDED
Status: DISCONTINUED | OUTPATIENT
Start: 2024-06-10 | End: 2024-06-10

## 2024-06-10 RX ORDER — LIDOCAINE HYDROCHLORIDE 10 MG/ML
0.1 INJECTION INFILTRATION; PERINEURAL ONCE
OUTPATIENT
Start: 2024-06-10 | End: 2024-06-10

## 2024-06-10 RX ADMIN — SUGAMMADEX 200 MG: 100 INJECTION, SOLUTION INTRAVENOUS at 14:24

## 2024-06-10 RX ADMIN — PROPOFOL 30 MG: 10 INJECTION, EMULSION INTRAVENOUS at 14:08

## 2024-06-10 RX ADMIN — Medication 120 MCG: at 13:32

## 2024-06-10 RX ADMIN — ROCURONIUM BROMIDE 20 MG: 10 INJECTION INTRAVENOUS at 14:13

## 2024-06-10 RX ADMIN — PROPOFOL 150 MG: 10 INJECTION, EMULSION INTRAVENOUS at 13:04

## 2024-06-10 RX ADMIN — LIDOCAINE HYDROCHLORIDE 100 MG: 20 INJECTION, SOLUTION INFILTRATION; PERINEURAL at 13:04

## 2024-06-10 RX ADMIN — Medication 120 MCG: at 14:37

## 2024-06-10 RX ADMIN — REMIFENTANIL HYDROCHLORIDE 20 MCG: 1 INJECTION, POWDER, LYOPHILIZED, FOR SOLUTION INTRAVENOUS at 14:31

## 2024-06-10 RX ADMIN — EPHEDRINE SULFATE 10 MG: 50 INJECTION, SOLUTION INTRAVENOUS at 14:55

## 2024-06-10 RX ADMIN — DEXAMETHASONE SODIUM PHOSPHATE 4 MG: 4 INJECTION INTRA-ARTICULAR; INTRALESIONAL; INTRAMUSCULAR; INTRAVENOUS; SOFT TISSUE at 13:18

## 2024-06-10 RX ADMIN — PROPOFOL 20 MG: 10 INJECTION, EMULSION INTRAVENOUS at 14:12

## 2024-06-10 RX ADMIN — PROPOFOL 200 MCG/KG/MIN: 10 INJECTION, EMULSION INTRAVENOUS at 13:07

## 2024-06-10 RX ADMIN — ROCURONIUM BROMIDE 70 MG: 10 INJECTION INTRAVENOUS at 13:04

## 2024-06-10 RX ADMIN — REMIFENTANIL HYDROCHLORIDE 0.05 MCG/KG/MIN: 1 INJECTION, POWDER, LYOPHILIZED, FOR SOLUTION INTRAVENOUS at 14:33

## 2024-06-10 RX ADMIN — PROPOFOL 50 MG: 10 INJECTION, EMULSION INTRAVENOUS at 15:07

## 2024-06-10 RX ADMIN — EPHEDRINE SULFATE 20 MG: 50 INJECTION, SOLUTION INTRAVENOUS at 14:41

## 2024-06-10 RX ADMIN — SODIUM CHLORIDE, SODIUM LACTATE, POTASSIUM CHLORIDE, AND CALCIUM CHLORIDE: 600; 310; 30; 20 INJECTION, SOLUTION INTRAVENOUS at 13:01

## 2024-06-10 ASSESSMENT — ENCOUNTER SYMPTOMS
DEPRESSION: 1
OCCASIONAL FEELINGS OF UNSTEADINESS: 0
LOSS OF SENSATION IN FEET: 0

## 2024-06-10 ASSESSMENT — PAIN SCALES - GENERAL
PAINLEVEL_OUTOF10: 0 - NO PAIN
PAINLEVEL_OUTOF10: 0 - NO PAIN
PAIN_LEVEL: 0
PAINLEVEL_OUTOF10: 0 - NO PAIN

## 2024-06-10 ASSESSMENT — COLUMBIA-SUICIDE SEVERITY RATING SCALE - C-SSRS
2. HAVE YOU ACTUALLY HAD ANY THOUGHTS OF KILLING YOURSELF?: NO
1. IN THE PAST MONTH, HAVE YOU WISHED YOU WERE DEAD OR WISHED YOU COULD GO TO SLEEP AND NOT WAKE UP?: NO
1. IN THE PAST MONTH, HAVE YOU WISHED YOU WERE DEAD OR WISHED YOU COULD GO TO SLEEP AND NOT WAKE UP?: NO
2. HAVE YOU ACTUALLY HAD ANY THOUGHTS OF KILLING YOURSELF?: NO

## 2024-06-10 ASSESSMENT — PAIN - FUNCTIONAL ASSESSMENT
PAIN_FUNCTIONAL_ASSESSMENT: 0-10

## 2024-06-10 NOTE — LETTER
Brianne Summers       5/6/2024                                                                                   INFORMATION FOR YOUR PROCEDURE    INSTRUCTIONS MUST BE FOLLOWED OR YOU RUN THE RISK OF YOUR CASE BEING CANCELED    Information is attached to this e-mail for your upcoming procedure. (Look for the paperclip in your email to access this information)  Lab requisitions (if needed), are also included as well as procedural instructions.       You are scheduled for your Bronchoscopy on  6/10/24 , with Dr. Caty Reyes   [facility] [address] [place] [address]    09:30 AM    - CT  Scan  [facility]   2nd Floor Radiology  [address]    When finished with the CT scan,   please make your way to [facility] Room 1300.  This is right around the corner from [facility].  Located on the first floor.  There are information desks there for your convenience and if you have questions.    Check In will be at  10:30  AM.  This allows us to prepare you for the actual procedure.                                        Bronchoscopy   Location:  [address] [facility]                                         Bronchoscopy / Endoscopy Suite    NPO (No food or drink) after midnight the night before your procedure. This includes coffee, water, and soda, hard candy, gum or mints.  If taking your morning medications, a small sip of water is allowed to get the medication down.    For your safety, you must have a responsible, adult  accompany you to your procedure.  You will not be permitted to drive yourself home if you have received any type of anesthesia or sedation.    Automated calls about your upcoming scheduled appointments can be quite confusing for patients.    The times may vary depending on what you have scheduled for procedure day. Follow the time/s I have given you on your information sheet so there is no confusion.  Be aware you may receive conflicting  times from different departments.      Blood work will need to be done prior to your procedure, preferably at a  Facility.  There are no restrictions for testing. I have attached a requisition for you.    Our Nurse Practitioner, Molly Arango CNP, will call you on 6/3/24, @1:00 PM    This is a phone visit to go over your medical history prior to your procedure, and is a necessary part of your medical workup.  The patient must be present at this visit.      Please reach out to me with any questions you may have  Portia  [phone] or Jackson @ [phone]    Have a nice day!    Portia Iverson    Bronchoscopy   Interventional Pulmonology    MD Mikey Blum MD Sameer Avasarala, MD Catalina Teba, MD Andrew Dunatchik, MD      [facility]  Pulmonary, Critical Care and Sleep Medicine  [address] [place] [address]  P -[phone]  - [phone]  [email or web address]

## 2024-06-10 NOTE — ANESTHESIA PREPROCEDURE EVALUATION
Patient: Brianne Mallory    Procedure Information       Date/Time: 06/10/24 1130    Scheduled providers: Caty CAMARA MD    Procedure: BRONCHOSCOPY    Location: [facility]            Relevant Problems   Anesthesia   (-) Difficult intubation   (-) Malignant hyperthermia      Pulmonary   (+) Malignant neoplasm of upper lobe of left lung (Multi)       Clinical information reviewed:    Allergies  Meds               NPO Detail:  NPO/Void Status  Date of Last Liquid: 06/09/24  Time of Last Liquid: 0800  Date of Last Solid: 06/09/24  Time of Last Solid: 1900  Last Intake Type: Clear fluids; Food  Time of Last Void: 1010         Physical Exam    Airway  Mallampati: II  TM distance: >3 FB     Cardiovascular   Rhythm: regular  Rate: normal     Dental    Pulmonary - normal exam     Abdominal - normal exam  Abdomen: soft         Anesthesia Plan    History of general anesthesia?: yes  History of complications of general anesthesia?: no    ASA 3     general     intravenous induction   Anesthetic plan and risks discussed with patient.    Plan discussed with CRNA.

## 2024-06-10 NOTE — ANESTHESIA POSTPROCEDURE EVALUATION
Patient: Brianne Mallory    Procedure Summary       Date: 06/10/24 Room / Location: [facility]    Anesthesia Start: 1302 Anesthesia Stop: 1523    Procedure: BRONCHOSCOPY Diagnosis:       Lung nodule      Malignant neoplasm of upper lobe of left lung (Multi)    Scheduled Providers: Caty CAMARA MD Responsible Provider: Guero Covarrubias MD    Anesthesia Type: general ASA Status: 3            Anesthesia Type: general    Vitals Value Taken Time   /64 06/10/24 1548   Temp 36.4 °C (97.5 °F) 06/10/24 1525   Pulse 82 06/10/24 1540   Resp 18 06/10/24 1540   SpO2 94 % 06/10/24 1540       Anesthesia Post Evaluation    Patient location during evaluation: PACU  Patient participation: complete - patient participated  Level of consciousness: awake  Pain score: 0  Pain management: adequate  Airway patency: patent  Cardiovascular status: acceptable  Respiratory status: acceptable  Hydration status: acceptable  Postoperative Nausea and Vomiting: none      No notable events documented.

## 2024-06-10 NOTE — INTERVAL H&P NOTE
79F [facility] GERTRUDE pT1cN0 NSCLC s/p lobectomy by Dr. Martinez on 1/21/21, hypothyroidism, presenting for bronchoscopy today for a new 2.4cm spiculated RUL nodule which was intensely hypermetabolic on PET, along with a mildly metabolic 4R LN c/f metastatic disease.     H&P reviewed. The patient was examined and there are no changes to the H&P.

## 2024-06-10 NOTE — ANESTHESIA PROCEDURE NOTES
Airway  Date/Time: 6/10/2024 1:06 PM  Urgency: elective    Airway not difficult    Staffing  Performed: LUIS   Authorized by: Guero Covarrubias MD    Performed by: LUIS Felix  Patient location during procedure: OR    Indications and Patient Condition  Indications for airway management: anesthesia and airway protection  Spontaneous ventilation: present  Sedation level: deep  Preoxygenated: yes  Patient position: sniffing  Mask difficulty assessment: 1 - vent by mask    Final Airway Details  Final airway type: endotracheal airway      Successful airway: ETT  Cuffed: yes   Successful intubation technique: direct laryngoscopy  Facilitating devices/methods: intubating stylet  Blade: Snehal  Blade size: #4  ETT size (mm): 8.0  Cormack-Lehane Classification: grade I - full view of glottis  Placement verified by: bronchoscopy, capnometry and palpation of cuff   Measured from: gums  ETT to gums (cm): 21  Number of attempts at approach: 1

## 2024-06-11 ASSESSMENT — PAIN SCALES - GENERAL: PAINLEVEL_OUTOF10: 0 - NO PAIN

## 2024-06-11 NOTE — ADDENDUM NOTE
Encounter addended by: Gregg Miramontes RN on: 6/11/2024 2:32 PM   Actions taken: Contacts section saved, Flowsheet accepted

## 2024-06-12 LAB
LABORATORY COMMENT REPORT: NORMAL
PATH REPORT.COMMENTS IMP SPEC: NORMAL
PATH REPORT.FINAL DX SPEC: NORMAL
PATH REPORT.GROSS SPEC: NORMAL
PATH REPORT.TOTAL CANCER: NORMAL

## 2024-06-13 LAB
LABORATORY COMMENT REPORT: NORMAL
LABORATORY COMMENT REPORT: NORMAL
PATH REPORT.FINAL DX SPEC: NORMAL
PATH REPORT.GROSS SPEC: NORMAL
PATH REPORT.INTRAOP OBS SPEC DOC: NORMAL
PATH REPORT.RELEVANT HX SPEC: NORMAL
PATH REPORT.TOTAL CANCER: NORMAL

## 2024-06-18 ENCOUNTER — TELEPHONE (OUTPATIENT)
Dept: CARDIOTHORACIC SURGERY | Facility: HOSPITAL | Age: 79
End: 2024-06-18
Payer: COMMERCIAL

## 2024-06-18 DIAGNOSIS — C34.12 MALIGNANT NEOPLASM OF UPPER LOBE OF LEFT LUNG (MULTI): ICD-10-CM

## 2024-06-18 DIAGNOSIS — R91.1 LUNG NODULE: ICD-10-CM

## 2024-06-18 NOTE — TELEPHONE ENCOUNTER
Result Communication    Resulted Orders   Bronchoscopy Tier 3; Navigational, w EBUS    Narrative    Bronchoscopy Report  [facility]  Location: [facility] procedure room 8    INDICATION:   Lung nodule - RUL  Mediastinal staging of suspected lung cancer  H/o GERTRUDE lobectomy    BRONCHOSCOPIST:   Tho Bishop MD  First Assistant: Pallavi Sharma, MD    REFERRING:    DO Deborah Wise DO    FINDINGS:  After obtaining informed consent and performing a time out, the patient   was anesthetized and an ET tube was placed by anesthesia.  The flexible   bronchoscope was then introduced through the advanced airway, and an   airway examination was performed.    The ET tube is in good position.  The trachea is of normal caliber. The   jose miguel is sharp. The tracheobronchial tree of the right lung(s) was   examined to at least the first subsegmental level.  Bronchial anatomy is   normal; there are no endobronchial lesions, and no secretions.    Left lung abnormalities:  GERTRUDE bronchia stump was well healed.    Guided peripheral bronchoscopy - SuperDimension - Electromagnetic   navigation bronchoscopy utilizing the superDimension system with iLogic   upgrade was performed.  The CT scan was used for planning purposes.  A   virtual bronchoscopic image was generated using the planning software.  A   24 mm RUL lesion was marked and a pathway was created.  After a complete   airway exam, the locatable guide/extended working channel was inserted and   an automatic registration was performed.  The navigation phase was then   begun to locate the target lesion(s).  Positioning was confirmed using   fluoroscopy and the Olympus radial probe catheter (Radial EBUS position:   central position), and sampling was performed.  The locatable guide was   removed from the extended working channel.    Transbronchial needle aspirations of the RUL lesion were performed using   PeriView FLEX and  sent for routine cytology.  The procedure was guided by   fluoroscopy.  Transbronchial needle aspiration technique was selected   because the sampling site was not visible endoscopically., The sampling   device penetrated the full thickness of the bronchial wall., Needle   aspiration of lung tissue was obtained..  4 samples with the needle were   obtained.     Transbronchial brushings of the RUL lesion were performed using a cytology   brush and sent for routine cytology.  The procedure was guided by   fluoroscopy.  Transbronchial technique was selected because the sampling   site was not visible endoscopically., The sampling device penetrated the   full thickness of the bronchial wall., Biopsy of lung tissue was obtained.    2 samples were obtained.     Transbronchial biopsies of the RUL lesion were performed using forceps and   sent for histopathologic evaluation.  The procedure was guided by   fluoroscopy.  Transbronchial biopsy technique was selected because the   sampling site was not visible endoscopically., The sampling device   penetrated the full thickness of the bronchial wall., Biopsy of lung   tissue was obtained.  4 biopsy passes were taken.     Rapid On-Site Evaluation (ARTURO): Preliminary cytology of the RUL lesion   showed non-diagnostic material (final results are pending).    The flexible bronchoscope was removed from the airway, and exchanged for   the curvilinear EBUS bronchoscope.  A systematic EBUS staging examination   of the bilateral sobia and mediastinum was performed, as documented below.    Lymph node sizing was performed via endobronchial ultrasound for suspected   lung cancer.  Sampling by transbronchial needle aspiration was performed   using a 22-gauge Olympus ViziShot needle and sent for routine cytology.    The 4L (lower paratracheal) node measured 8.7 mm in size. Sampling was   done, 3 samples with the needle were obtained.  The 2L (upper paratracheal) node measured 3.7 mm in size.  Sampling was not   done due to size criteria (less than 5 mm).  The 7 (subcarinal) node measured 7.1 mm in size. Sampling was done, 3   samples with the needle were obtained.  The 4R (lower paratracheal) node measured 14.1 mm in size. Sampling was   done, 3 samples with the needle were obtained.  The 2R (upper paratracheal) node measured 4.4 mm in size. Sampling was not   done due to size criteria (less than 5 mm).  The 11Rs (superios interlobar) node measured 7.2 mm in size. Sampling was   done, 3 samples with the needle were obtained.  The 11Ri (inferior interlobar) node measured 8.0 mm in size. Sampling was   done, 3 samples with the needle were obtained.    Rapid On-Site Evaluation (ARTURO):  Preliminary cytology from the lymph node station(s) 4L, 4R, 7, 11Rs, 11Ri   showed lymphoid tissue (final results are pending).    COMPLICATIONS:  None    ESTIMATED BLOOD LOSS:    Minimal, < 5 mL    The physical status of the patient was re-assessed after the procedure.    The patient was transported to the recovery area / PACU in stable   condition.      Impression       Guided bronchoscopy with electromagnetic navigation and radial probe EBUS   to localize the peripheral RUL lesion was performed.  A transbronchial needle aspiration was performed in the RUL.  A transbronchial brushing was performed in the RUL.  Rapid On-Site Evaluation (ARTURO): Preliminary cytology of the lesion in the   RUL was suggestive of non-diagnostic material (final results are pending).  A transbronchial biopsy was performed in the RUL.  A systematic EBUS staging evaluation of the bilateral sobia and mediastinum   was performed with EBUS-TBNA, as detailed above.  Rapid On-Site Evaluation (ARTURO): Preliminary cytology was suggestive of   lymphoid tissue in node level 4L, 4R, 7, 11Rs, 11Ri (final results are   pending).    RECOMMENDATION:   The patient will be observed post-procedure, until all discharge criteria   are met.  Await cytology, histopathology  results.  Follow-up with referring physician.           Component    Final Cytological Interpretation   A. LUNG, RIGHT UPPER LOBE, NODULE, FINE NEEDLE ASPIRATION, CYTOLOGY AND CELL BLOCK:  --  Non diagnostic.  Specimen consists of benign lung elements and acute and chronic inflammatory cells.  --  Correlate with concurrent biopsy surgical pathology report ([id number]).               B. LUNG, RIGHT UPPER LOBE, NODULE, BRONCHIAL BRUSH, CYTOLOGY AND CELL BLOCK:  --  Non diagnostic.  Specimen consists of benign lung elements.  --  Correlate with concurrent biopsy surgical pathology report ([id number]).                             C. LYMPH NODE, 4 L PULMONARY, FINE NEEDLE ASPIRATION, CYTOLOGY AND CELL BLOCK:  --  No malignant cells identified.  --  Lymphoid sample.           D. LYMPH NODE, 4 R PULMONARY, FINE NEEDLE ASPIRATION, CYTOLOGY AND CELL BLOCK:  --  No malignant cells identified.  --  Lymphoid sample.                        E. LYMPH NODE,  7 PULMONARY, FINE NEEDLE ASPIRATION, CYTOLOGY AND CELL BLOCK:  --  No malignant cells identified.  --  Lymphoid sample.            F. LYMPH NODE, 11 Rs PULMONARY, FINE NEEDLE ASPIRATION, CYTOLOGY AND CELL BLOCK:  --  No malignant cells identified.  --  Lymphoid sample.                         G. LYMPH NODE, 11 Ri PULMONARY, FINE NEEDLE ASPIRATION, CYTOLOGY AND CELL BLOCK:  --  No malignant cells identified.  --  Lymphoid sample.      Electronically signed by Vaishnavi Bell MD on 6/13/2024 at 1253       STUDY:  NM PET CT LUNG CA STAGING;  5/16/2024 9:50 am      INDICATION:  Signs/Symptoms:new lung nodule concerning for recurrence..      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 Was found to have a new  lung mass      COMPARISON:  CT 04/29/2024 and 04/11/2023      ACCESSION NUMBER(S):  [id number]      ORDERING CLINICIAN:  KIMBERLEY VIERA      TECHNIQUE:  DIVISION OF NUCLEAR MEDICINE  POSITRON EMISSION  TOMOGRAPHY (PET-CT)      The patient received an intravenous dose of 13.5 mCi of Fluorine-18  fluorodeoxyglucose (FDG).  Positron emission tomographic (PET) images  from skull vertex to the feet were then acquired after a one hour  delay. Also acquired was a contemporaneous low dose non-contrast CT  scan performed for attenuation correction of PET images and anatomic  localization.  The PET and CT images were digitally fused for  display.  All images were acquired on a combined PET-CT scanner unit.  Some areas of FDG accumulation may be described in standardized  uptake value (SUV) units.      CODING:  Initial Treatment Strategy (PI)          CALIBRATION:  Dose Injection-to-Scan Interval (mins): 61 min  Mediastinal bloodpool SUV (normal 1.5-2.5): 2.1  Blood glucose: 113 mg/dL      FINDINGS:  NECK:      No focal hypermetabolic soft tissue lesion is seen in the neck.  No hypermetabolic cervical lymphadenopathy is present.          CHEST:  Previously described spiculated nodule in the right upper lobe  measures 2.7 x 2.5 cm, slightly increased from 2.4 x 2.2 cm  04/29/2024. There is associated intense hypermetabolic activity with  SUV max of 14.7  2 hypermetabolic station 4a lymph nodes are noted with SUV max of 4.  There is an additional mildly hypermetabolic subcentimeter station 4R  paratracheal lymph node more superiorly with SUV max of 2.4.  Postsurgical changes of left upper lobectomy without evidence of  hypermetabolic activity along the suture line to suggest locoregional  recurrence.          ABDOMEN AND PELVIS:  No hypermetabolic soft tissue lesion is present in the abdomen and  pelvis. No evidence of hypermetabolic lymphadenopathy.  Physiologic radiotracer uptake is present in the liver and spleen  with excretion into the bowel loops and the genitourinary tract.  Cholelithiasis. No discernible gallbladder wall thickening,  pericholecystic fluid, stranding. A defect is noted in the  posteromedial left  hemidiaphragm with herniation of the gastric  cardia into the intrathoracic cavity.      MUSCULOSKELETAL/EXTREMITIES:  No focal hypermetabolic lesion is seen in the axial or appendicular  to suggest osseous metastasis.      IMPRESSION:  1. Previously described spiculated nodule in the right upper lobe  demonstrates intense hypermetabolic activity and is most compatible  with malignancy. Intensely hypermetabolic station 4R lymph nodes are  most consistent with metastatic disease.  2. Postsurgical changes of left upper lobectomy without evidence of  abnormal hypermetabolic activity along the resection site to indicate  recurrent disease.  3. No evidence of FDG avid distant metastatic disease.    2:05 PM      Results were successfully communicated with the  daughter, Danya,  and they acknowledged their understanding.  Will arrange for IR transthoracic bx at [facility].     Gissell Pacheco DO  Thoracic & Esophageal Surgery

## 2024-06-23 DIAGNOSIS — R79.1 INR (INTERNATIONAL NORMAL RATIO) ABNORMAL: ICD-10-CM

## 2024-06-23 DIAGNOSIS — R91.1 LUNG NODULE: Primary | ICD-10-CM

## 2024-07-03 ENCOUNTER — LAB (OUTPATIENT)
Dept: LAB | Facility: LAB | Age: 79
End: 2024-07-03
Payer: COMMERCIAL

## 2024-07-03 DIAGNOSIS — R79.1 INR (INTERNATIONAL NORMAL RATIO) ABNORMAL: ICD-10-CM

## 2024-07-03 DIAGNOSIS — C34.12 MALIGNANT NEOPLASM OF UPPER LOBE OF LEFT LUNG (MULTI): ICD-10-CM

## 2024-07-03 DIAGNOSIS — Z01.818 PRE-OP TESTING: ICD-10-CM

## 2024-07-03 DIAGNOSIS — R91.1 LUNG NODULE: ICD-10-CM

## 2024-07-03 LAB
ANION GAP SERPL CALC-SCNC: 14 MMOL/L (ref 10–20)
BUN SERPL-MCNC: 12 MG/DL (ref 6–23)
CALCIUM SERPL-MCNC: 9.2 MG/DL (ref 8.6–10.3)
CHLORIDE SERPL-SCNC: 104 MMOL/L (ref 98–107)
CO2 SERPL-SCNC: 25 MMOL/L (ref 21–32)
CREAT SERPL-MCNC: 0.81 MG/DL (ref 0.5–1.05)
EGFRCR SERPLBLD CKD-EPI 2021: 73 ML/MIN/1.73M*2
ERYTHROCYTE [DISTWIDTH] IN BLOOD BY AUTOMATED COUNT: 12.4 % (ref 11.5–14.5)
GLUCOSE SERPL-MCNC: 96 MG/DL (ref 74–99)
HCT VFR BLD AUTO: 42 % (ref 36–46)
HGB BLD-MCNC: 13.4 G/DL (ref 12–16)
INR PPP: 1 (ref 0.9–1.1)
MCH RBC QN AUTO: 30.7 PG (ref 26–34)
MCHC RBC AUTO-ENTMCNC: 31.9 G/DL (ref 32–36)
MCV RBC AUTO: 96 FL (ref 80–100)
NRBC BLD-RTO: 0 /100 WBCS (ref 0–0)
PLATELET # BLD AUTO: 339 X10*3/UL (ref 150–450)
POTASSIUM SERPL-SCNC: 4.3 MMOL/L (ref 3.5–5.3)
PROTHROMBIN TIME: 11.5 SECONDS (ref 9.8–12.8)
RBC # BLD AUTO: 4.36 X10*6/UL (ref 4–5.2)
SODIUM SERPL-SCNC: 139 MMOL/L (ref 136–145)
WBC # BLD AUTO: 6.6 X10*3/UL (ref 4.4–11.3)

## 2024-07-03 PROCEDURE — 36415 COLL VENOUS BLD VENIPUNCTURE: CPT

## 2024-07-03 PROCEDURE — 85027 COMPLETE CBC AUTOMATED: CPT

## 2024-07-03 PROCEDURE — 80048 BASIC METABOLIC PNL TOTAL CA: CPT

## 2024-07-03 PROCEDURE — 85610 PROTHROMBIN TIME: CPT

## 2024-07-09 ENCOUNTER — HOSPITAL ENCOUNTER (OUTPATIENT)
Dept: RADIOLOGY | Facility: HOSPITAL | Age: 79
Discharge: HOME | End: 2024-07-09
Payer: COMMERCIAL

## 2024-07-09 VITALS
DIASTOLIC BLOOD PRESSURE: 54 MMHG | RESPIRATION RATE: 16 BRPM | HEART RATE: 58 BPM | SYSTOLIC BLOOD PRESSURE: 140 MMHG | OXYGEN SATURATION: 97 % | TEMPERATURE: 96.8 F

## 2024-07-09 DIAGNOSIS — R91.1 LUNG NODULE: ICD-10-CM

## 2024-07-09 DIAGNOSIS — C34.12 MALIGNANT NEOPLASM OF UPPER LOBE OF LEFT LUNG (MULTI): ICD-10-CM

## 2024-07-09 PROCEDURE — 7100000010 HC PHASE TWO TIME - EACH INCREMENTAL 1 MINUTE

## 2024-07-09 PROCEDURE — 7100000009 HC PHASE TWO TIME - INITIAL BASE CHARGE

## 2024-07-09 PROCEDURE — 99152 MOD SED SAME PHYS/QHP 5/>YRS: CPT | Performed by: RADIOLOGY

## 2024-07-09 PROCEDURE — 99152 MOD SED SAME PHYS/QHP 5/>YRS: CPT

## 2024-07-09 PROCEDURE — 32408 CORE NDL BX LNG/MED PERQ: CPT | Performed by: RADIOLOGY

## 2024-07-09 PROCEDURE — 2500000004 HC RX 250 GENERAL PHARMACY W/ HCPCS (ALT 636 FOR OP/ED): Performed by: RADIOLOGY

## 2024-07-09 PROCEDURE — 32408 CORE NDL BX LNG/MED PERQ: CPT

## 2024-07-09 PROCEDURE — 71045 X-RAY EXAM CHEST 1 VIEW: CPT

## 2024-07-09 PROCEDURE — 2720000007 HC OR 272 NO HCPCS

## 2024-07-09 RX ORDER — MIDAZOLAM HYDROCHLORIDE 1 MG/ML
INJECTION INTRAMUSCULAR; INTRAVENOUS
Status: COMPLETED | OUTPATIENT
Start: 2024-07-09 | End: 2024-07-09

## 2024-07-09 RX ORDER — FENTANYL CITRATE 50 UG/ML
INJECTION, SOLUTION INTRAMUSCULAR; INTRAVENOUS
Status: COMPLETED | OUTPATIENT
Start: 2024-07-09 | End: 2024-07-09

## 2024-07-09 ASSESSMENT — PAIN SCALES - GENERAL
PAINLEVEL_OUTOF10: 0 - NO PAIN

## 2024-07-09 ASSESSMENT — PAIN - FUNCTIONAL ASSESSMENT
PAIN_FUNCTIONAL_ASSESSMENT: 0-10

## 2024-07-09 NOTE — Clinical Note
Lung bx complete. 1 core obtained. Dressing CDI. Total 2mg versed, 100mcg fentanyl given ivp. VSS t/o procedure.

## 2024-07-09 NOTE — POST-PROCEDURE NOTE
Interventional Radiology Brief Postprocedure Note    Attending: Dr. Peña    Assistant: Dr. Degroot    Diagnosis: lung mass    Description of procedure: Using CT guidance, a total of 1 passes were made into a right upper lobe .nodule using a 18 Gauge BARD needle passed through a 17 gauge coaxial system. Scanning after each pass demonstrated no bleeding. No pneumothorax was identified. See radiology report for full details.      Anesthesia:  MAC    Complications: None    Estimated Blood Loss: minimal    Medications (Filter: Administrations occurring from 0939 to 1004 on 07/09/24) As of 07/09/24 1004      midazolam (Versed) injection (mg) Total dose:  2 mg      Date/Time Rate/Dose/Volume Action       07/09/24  0947 1 mg Given      0959 1 mg Given               fentaNYL PF (Sublimaze) injection (mcg) Total dose:  100 mcg      Date/Time Rate/Dose/Volume Action       07/09/24  0948 50 mcg Given      0959 50 mcg Given                   No specimens collected      See detailed result report with images in PACS.    The patient tolerated the procedure well without incident or complication and is in stable condition.

## 2024-07-09 NOTE — PRE-PROCEDURE NOTE
Interventional Radiology Preprocedure Note    Indication for procedure: Diagnoses of Malignant neoplasm of upper lobe of left lung (Multi) and Lung nodule were pertinent to this visit.    Relevant review of systems: NA    Relevant Labs:   Lab Results   Component Value Date    CREATININE 0.81 07/03/2024    EGFR 73 07/03/2024    INR 1.0 07/03/2024    PROTIME 11.5 07/03/2024       Planned Sedation/Anesthesia: Moderate    Airway assessment: normal    Directed physical examination:    AO*3, no respiratory distress, symmetric chest expansion    Mallampati: II (hard and soft palate, upper portion of tonsils and uvula visible)    ASA Score: ASA 3 - Patient with moderate systemic disease with functional limitations    Benefits, risks and alternatives of procedure and planned sedation have been discussed with the patient and/or their representative. All questions answered and they agree to proceed.

## 2024-07-12 LAB
LABORATORY COMMENT REPORT: NORMAL
PATH REPORT.FINAL DX SPEC: NORMAL
PATH REPORT.GROSS SPEC: NORMAL
PATH REPORT.RELEVANT HX SPEC: NORMAL
PATH REPORT.TOTAL CANCER: NORMAL

## 2024-07-16 DIAGNOSIS — R91.1 LUNG NODULE: Primary | ICD-10-CM

## 2024-07-16 NOTE — PROGRESS NOTES
Patient s/p repeat biopsy of new RUL mass - discussed results with daughter, Danya: [phone]    FINAL DIAGNOSIS      A.  LUNG, RIGHT, BIOPSY:  - Lung parenchyma with reactive pneumocytes, acute and chronic inflammation and foci of organizing pneumonia, see note.     Note: The findings are nonspecific for etiology, and the differential diagnosis may include infection, collagen vascular disease, drug reaction, or may be adjacent to the wall of an abscess or an unsampled mass lesion. No malignant cells are identified in the current specimen. The biopsy is reviewed with Dr. Todd from hematopathology and he concurs that the changes seen are not supportive of a lymphoproliferative disorder. GMS is negative for fungal organisms. Clinical and radiologic correlation is recommended to determine if these findings are representative of the targeted lesion. Repeat biopsy may be considered, if clinically indicated.       : Carrie Neri.    Electronically signed by Clarisa Higgins DO on 7/12/2024 at 1557         Final Cytological Interpretation   A. LUNG, RIGHT UPPER LOBE, NODULE, FINE NEEDLE ASPIRATION, CYTOLOGY AND CELL BLOCK:  --  Non diagnostic.  Specimen consists of benign lung elements and acute and chronic inflammatory cells.  --  Correlate with concurrent biopsy surgical pathology report ([id number]).               B. LUNG, RIGHT UPPER LOBE, NODULE, BRONCHIAL BRUSH, CYTOLOGY AND CELL BLOCK:  --  Non diagnostic.  Specimen consists of benign lung elements.  --  Correlate with concurrent biopsy surgical pathology report ([id number]).                             C. LYMPH NODE, 4 L PULMONARY, FINE NEEDLE ASPIRATION, CYTOLOGY AND CELL BLOCK:  --  No malignant cells identified.  --  Lymphoid sample.           D. LYMPH NODE, 4 R PULMONARY, FINE NEEDLE ASPIRATION, CYTOLOGY AND CELL BLOCK:  --  No malignant cells identified.  --  Lymphoid sample.                        E. LYMPH NODE,  7 PULMONARY,  FINE NEEDLE ASPIRATION, CYTOLOGY AND CELL BLOCK:  --  No malignant cells identified.  --  Lymphoid sample.            F. LYMPH NODE, 11 Rs PULMONARY, FINE NEEDLE ASPIRATION, CYTOLOGY AND CELL BLOCK:  --  No malignant cells identified.  --  Lymphoid sample.                         G. LYMPH NODE, 11 Ri PULMONARY, FINE NEEDLE ASPIRATION, CYTOLOGY AND CELL BLOCK:  --  No malignant cells identified.  --  Lymphoid sample.      Electronically signed by Vaishnavi Bell MD on 6/13/2024 at 1253       Discussed that given patient's history, the appearance of the lesion and growth between April and June CT scans I am still highly concerned about malignancy.  Daughter does not think patient will want surgery. Will plan for Radiation Oncology referral for discussion of potential radiation.    Gissell Pacheco, DO  Thoracic & Esophageal Surgery

## 2024-07-25 ENCOUNTER — TELEPHONE (OUTPATIENT)
Dept: RADIATION ONCOLOGY | Facility: HOSPITAL | Age: 79
End: 2024-07-25
Payer: COMMERCIAL

## 2024-07-25 NOTE — TELEPHONE ENCOUNTER
Called pt. to remind of appointment on 7/29/2024 at 10:30 am with Dr. Kramer. Pt answered and confirmed  appointment.

## 2024-07-29 ENCOUNTER — HOSPITAL ENCOUNTER (OUTPATIENT)
Dept: RADIATION ONCOLOGY | Facility: HOSPITAL | Age: 79
Setting detail: RADIATION/ONCOLOGY SERIES
Discharge: HOME | End: 2024-07-29
Payer: COMMERCIAL

## 2024-07-29 VITALS
TEMPERATURE: 98.1 F | HEIGHT: 68 IN | WEIGHT: 225.6 LBS | SYSTOLIC BLOOD PRESSURE: 143 MMHG | HEART RATE: 64 BPM | BODY MASS INDEX: 34.19 KG/M2 | RESPIRATION RATE: 18 BRPM | DIASTOLIC BLOOD PRESSURE: 83 MMHG | OXYGEN SATURATION: 94 %

## 2024-07-29 DIAGNOSIS — R91.1 LUNG NODULE: ICD-10-CM

## 2024-07-29 DIAGNOSIS — C34.12 MALIGNANT NEOPLASM OF UPPER LOBE OF LEFT LUNG (MULTI): Primary | ICD-10-CM

## 2024-07-29 PROCEDURE — 99205 OFFICE O/P NEW HI 60 MIN: CPT | Performed by: STUDENT IN AN ORGANIZED HEALTH CARE EDUCATION/TRAINING PROGRAM

## 2024-07-29 PROCEDURE — 99215 OFFICE O/P EST HI 40 MIN: CPT | Performed by: STUDENT IN AN ORGANIZED HEALTH CARE EDUCATION/TRAINING PROGRAM

## 2024-07-29 RX ORDER — LEVOFLOXACIN 750 MG/1
750 TABLET ORAL DAILY
Qty: 7 TABLET | Refills: 0 | Status: SHIPPED | OUTPATIENT
Start: 2024-07-29 | End: 2024-07-30

## 2024-07-29 ASSESSMENT — ENCOUNTER SYMPTOMS
MUSCULOSKELETAL NEGATIVE: 1
LOSS OF SENSATION IN FEET: 0
APPETITE CHANGE: 1
NEUROLOGICAL NEGATIVE: 1
GASTROINTESTINAL NEGATIVE: 1
ENDOCRINE NEGATIVE: 1
CARDIOVASCULAR NEGATIVE: 1
EYES NEGATIVE: 1
OCCASIONAL FEELINGS OF UNSTEADINESS: 0
PSYCHIATRIC NEGATIVE: 1
BRUISES/BLEEDS EASILY: 1
RESPIRATORY NEGATIVE: 1
DEPRESSION: 0

## 2024-07-29 ASSESSMENT — COLUMBIA-SUICIDE SEVERITY RATING SCALE - C-SSRS
2. HAVE YOU ACTUALLY HAD ANY THOUGHTS OF KILLING YOURSELF?: NO
6. HAVE YOU EVER DONE ANYTHING, STARTED TO DO ANYTHING, OR PREPARED TO DO ANYTHING TO END YOUR LIFE?: NO
1. IN THE PAST MONTH, HAVE YOU WISHED YOU WERE DEAD OR WISHED YOU COULD GO TO SLEEP AND NOT WAKE UP?: NO

## 2024-07-29 ASSESSMENT — PATIENT HEALTH QUESTIONNAIRE - PHQ9
1. LITTLE INTEREST OR PLEASURE IN DOING THINGS: NOT AT ALL
2. FEELING DOWN, DEPRESSED OR HOPELESS: NOT AT ALL
SUM OF ALL RESPONSES TO PHQ9 QUESTIONS 1 AND 2: 0

## 2024-07-29 NOTE — PROGRESS NOTES
Radiation Oncology Outpatient Consult    Patient Name:  Brianne Mallory  MRN:  [id number]  :  1945    Referring Provider: Gissell Pacheco DO  Primary Care Provider: Deborah Schneider DO  Care Team: Patient Care Team:  Deborah Schneider DO as PCP - General (Family Medicine)    Date of Service: 2024     SUBJECTIVE  History of Present Illness:  Brianne Mallory is a 79 y.o. female who was referred by Gissell Pacheco DO, for a consultation to the [facility] Department of Radiation Oncology.  She is presenting for evaluation and management of RUL lung nodule.    Ms. Mallory is a 79 year old woman with significant history of stage I - NSCLC of the GERTRUDE s/p lobectomy in 2021. Now presents with new RUL lesion that is PET avid. However bronchoscopy and IR CT guided biopsy was not diagnostic.     She is doing well today. Denies any SOB, chest pain, fevers or any other symptoms.         Prior Radiotherapy:  No radiation treatments to show. (Treatments may have been administered in another system.)       Past Medical History:    Past Medical History:   Diagnosis Date    Hyperlipidemia         Past Surgical History:    Past Surgical History:   Procedure Laterality Date    OTHER SURGICAL HISTORY  2020    Bronchoscopy    OTHER SURGICAL HISTORY  2020    Biopsy    OTHER SURGICAL HISTORY  2022    Knee replacement    OTHER SURGICAL HISTORY Left 2021    GERTRUDE Lung lobectomy        Family History:  Cancer-related family history includes Lung cancer in her maternal grandmother.    Social History:    Social History     Tobacco Use    Smoking status: Former     Current packs/day: 1.00     Average packs/day: 1 pack/day for 5.2 years (5.2 ttl pk-yrs)     Types: Cigarettes     Start date: 2019    Smokeless tobacco: Never   Substance Use Topics    Alcohol use: Not Currently     Comment: very rare    Drug use: Never       Allergies:    Allergies   Allergen  "Reactions    Aspirin Hives    Ibuprofen Hives    Penicillins Rash     mild; occurred approx 20 years ago        Medications:    Current Outpatient Medications:     cholecalciferol (Vitamin D-3) 125 MCG (5000 UT) capsule, Daily, Disp: , Rfl:     cyanocobalamin (Vitamin B-12) 1,000 mcg tablet, Take 1 tablet (1,000 mcg) by mouth once daily., Disp: , Rfl:     levothyroxine (Synthroid, Levoxyl) 88 mcg tablet, Take 1 tablet (88 mcg) by mouth early in the morning.., Disp: , Rfl:     sertraline (Zoloft) 25 mg tablet, Take 1 tablet (25 mg) by mouth at 3:00 in the morning., Disp: , Rfl:     simvastatin (Zocor) 40 mg tablet, Take 1 tablet (40 mg) by mouth once daily at bedtime., Disp: , Rfl:       Review of Systems:  Please see the associated nursing note for a complete ROS.    Performance Status:  The Karnofsky performance scale today is 80, Normal activity with effort; some signs or symptoms of disease (ECOG equivalent 1).        OBJECTIVE  /83   Pulse 64   Temp 36.7 °C (98.1 °F) (Temporal)   Resp 18   Ht 1.727 m (5' 8\")   Wt 102 kg (225 lb 9.6 oz)   SpO2 94%   BMI 34.30 kg/m²    Physical Exam  HENT:      Head: Normocephalic.      Nose: Nose normal.      Mouth/Throat:      Mouth: Mucous membranes are moist.   Eyes:      Extraocular Movements: Extraocular movements intact.   Cardiovascular:      Heart sounds: Normal heart sounds.   Pulmonary:      Effort: No respiratory distress.      Breath sounds: No stridor. No wheezing or rhonchi.   Abdominal:      General: Abdomen is flat. Bowel sounds are normal.      Palpations: Abdomen is soft.   Skin:     General: Skin is warm.   Neurological:      General: No focal deficit present.      Mental Status: She is alert.        Laboratory Review:  There are no laboratory contraindications to radiation therapy.    The pertinent lab results were reviewed and discussed with the patient.    Pathology Review:  The pertinent pathology results were reviewed and discussed with the " patient.      Imaging:  The pertinent imaging results were reviewed and discussed with the patient.         ASSESSMENT:   Brianne Mallory is a 79 y.o. 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 She presents with new RUL - PET positive nodule that did not show malignancy on a bronchoscopy and CT guided biopsy did not show any malignant cells.    PLAN:   I had a detailed discussion with Ms. Mallory regarding her clinical and pathological findings. I explained to her that on her serial imaging she was found to have a rapidly growing lesion in the RUL. However multiple attempts at biopsy were negative (both endobronchial and CT guided biopsy). This lesion was not present last year. This is unusual for NSCLC to grow this rapidly and not have tissue on biopsy in my experience. We discussed the possibility that this could be infectious, although she has no infectious symptoms. I will start her on a short course of empiric antibiotics. I will also present her at our weekly tumor board. I will also obtain a new set of PFT's for the patient. Based on the tumor board recommendations we will either proceed with short interval repeat imaging vs emperic SBRT to the lung. The risks, benefits and alternatives of both were discussed in detail with the patient.     Update: Patient's case was discussed at the tumor board. There was concern for a inflammatory process. The recommendation was to do a short interval scan with some steroids. We also have her on a short course of antibiotics. Information relayed to the family. Orders placed. Return to clinic in 1 month.     NCCN Guidelines were applicable to guide this patients treatment plan.   Herb Kramer MD, MS

## 2024-07-29 NOTE — PROGRESS NOTES
Radiation Oncology Nursing Note    Prior Radiotherapy:  No  No radiation treatments to show. (Treatments may have been administered in another system.)     Current Systemic Treatment:  No     Presence of Pacemaker or ICD:  No    History of Autoimmune or Connective Tissue Disorders:  No    Pain: The patient's current pain level was assessed.  They report currently having a pain of 0 out of 10.  They feel their pain is under control without the use of pain medications.    Review of Systems:  Review of Systems   Constitutional:  Positive for appetite change.   HENT:  Negative.     Eyes: Negative.    Respiratory: Negative.     Cardiovascular: Negative.    Gastrointestinal: Negative.    Endocrine: Negative.    Genitourinary: Negative.     Musculoskeletal: Negative.    Skin: Negative.    Neurological: Negative.    Hematological:  Bruises/bleeds easily.   Psychiatric/Behavioral: Negative.

## 2024-07-30 DIAGNOSIS — C34.12 MALIGNANT NEOPLASM OF UPPER LOBE OF LEFT LUNG (MULTI): ICD-10-CM

## 2024-07-30 DIAGNOSIS — R91.1 LUNG NODULE: ICD-10-CM

## 2024-07-30 RX ORDER — LEVOFLOXACIN 750 MG/1
750 TABLET ORAL DAILY
Qty: 7 TABLET | Refills: 0 | Status: SHIPPED | OUTPATIENT
Start: 2024-07-30 | End: 2024-08-06

## 2024-08-02 ENCOUNTER — TUMOR BOARD CONFERENCE (OUTPATIENT)
Dept: HEMATOLOGY/ONCOLOGY | Facility: HOSPITAL | Age: 79
End: 2024-08-02
Payer: COMMERCIAL

## 2024-08-05 NOTE — ADDENDUM NOTE
Encounter addended by: Herb Kramer MD, MS on: 8/5/2024 1:03 PM   Actions taken: Clinical Note Signed

## 2024-08-05 NOTE — ADDENDUM NOTE
Encounter addended by: Herb Kramer MD, MS on: 8/5/2024 12:59 PM   Actions taken: Pharmacy for encounter modified, Order list changed, Diagnosis association updated

## 2024-08-15 ENCOUNTER — HOSPITAL ENCOUNTER (OUTPATIENT)
Dept: RESPIRATORY THERAPY | Facility: HOSPITAL | Age: 79
Discharge: HOME | End: 2024-08-15
Payer: COMMERCIAL

## 2024-08-15 DIAGNOSIS — C34.12 MALIGNANT NEOPLASM OF UPPER LOBE OF LEFT LUNG (MULTI): ICD-10-CM

## 2024-08-15 DIAGNOSIS — R91.1 LUNG NODULE: ICD-10-CM

## 2024-08-15 LAB
MGC ASCENT PFT - FEV1 - PRE: 1.71
MGC ASCENT PFT - FEV1 - PREDICTED: 2.21
MGC ASCENT PFT - FVC - PRE: 2.79
MGC ASCENT PFT - FVC - PREDICTED: 2.96

## 2024-08-15 PROCEDURE — 94726 PLETHYSMOGRAPHY LUNG VOLUMES: CPT

## 2024-08-21 DIAGNOSIS — R91.1 LUNG NODULE: Primary | ICD-10-CM

## 2024-08-21 RX ORDER — PANTOPRAZOLE SODIUM 40 MG/1
40 TABLET, DELAYED RELEASE ORAL
Qty: 30 TABLET | Refills: 0 | Status: SHIPPED | OUTPATIENT
Start: 2024-08-21 | End: 2024-09-20

## 2024-08-21 RX ORDER — METHYLPREDNISOLONE 4 MG/1
4 TABLET ORAL ONCE
Qty: 21 TABLET | Refills: 0 | Status: SHIPPED | OUTPATIENT
Start: 2024-08-21 | End: 2024-08-21

## 2024-09-04 ENCOUNTER — ANCILLARY PROCEDURE (OUTPATIENT)
Facility: HOSPITAL | Age: 79
End: 2024-09-04
Payer: COMMERCIAL

## 2024-09-04 ENCOUNTER — APPOINTMENT (OUTPATIENT)
Facility: HOSPITAL | Age: 79
End: 2024-09-04
Payer: COMMERCIAL

## 2024-09-04 DIAGNOSIS — R91.1 LUNG NODULE: ICD-10-CM

## 2024-09-04 PROCEDURE — 71250 CT THORAX DX C-: CPT | Performed by: STUDENT IN AN ORGANIZED HEALTH CARE EDUCATION/TRAINING PROGRAM

## 2024-09-04 PROCEDURE — 71250 CT THORAX DX C-: CPT

## 2024-09-05 ENCOUNTER — APPOINTMENT (OUTPATIENT)
Facility: HOSPITAL | Age: 79
End: 2024-09-05
Payer: COMMERCIAL

## 2024-09-06 ENCOUNTER — TELEPHONE (OUTPATIENT)
Dept: RADIATION ONCOLOGY | Facility: HOSPITAL | Age: 79
End: 2024-09-06
Payer: COMMERCIAL

## 2024-09-09 ENCOUNTER — TELEPHONE (OUTPATIENT)
Dept: RADIATION ONCOLOGY | Facility: HOSPITAL | Age: 79
End: 2024-09-09
Payer: COMMERCIAL

## 2024-09-11 ENCOUNTER — HOSPITAL ENCOUNTER (OUTPATIENT)
Dept: RADIATION ONCOLOGY | Facility: HOSPITAL | Age: 79
Setting detail: RADIATION/ONCOLOGY SERIES
Discharge: HOME | End: 2024-09-11
Payer: COMMERCIAL

## 2024-09-11 VITALS
HEART RATE: 90 BPM | BODY MASS INDEX: 34.12 KG/M2 | TEMPERATURE: 97.9 F | WEIGHT: 224.4 LBS | DIASTOLIC BLOOD PRESSURE: 71 MMHG | RESPIRATION RATE: 18 BRPM | OXYGEN SATURATION: 95 % | SYSTOLIC BLOOD PRESSURE: 143 MMHG

## 2024-09-11 DIAGNOSIS — R91.1 LUNG NODULE: ICD-10-CM

## 2024-09-11 DIAGNOSIS — C34.12 MALIGNANT NEOPLASM OF UPPER LOBE OF LEFT LUNG (MULTI): ICD-10-CM

## 2024-09-11 PROCEDURE — 99213 OFFICE O/P EST LOW 20 MIN: CPT | Performed by: STUDENT IN AN ORGANIZED HEALTH CARE EDUCATION/TRAINING PROGRAM

## 2024-09-11 ASSESSMENT — ENCOUNTER SYMPTOMS
NEUROLOGICAL NEGATIVE: 1
CARDIOVASCULAR NEGATIVE: 1
ENDOCRINE NEGATIVE: 1
CONSTITUTIONAL NEGATIVE: 1
PSYCHIATRIC NEGATIVE: 1
WHEEZING: 1
GASTROINTESTINAL NEGATIVE: 1
EYES NEGATIVE: 1
MUSCULOSKELETAL NEGATIVE: 1

## 2024-09-11 NOTE — PROGRESS NOTES
Radiation Oncology Nursing Note    Pain: The patient's current pain level was assessed.  They report currently having a pain of 0 out of 10.  They feel their pain is under control without the use of pain medications.    Review of Systems:  Review of Systems   Constitutional: Negative.    HENT:  Negative.     Eyes: Negative.    Respiratory:  Positive for wheezing.    Cardiovascular: Negative.    Gastrointestinal: Negative.    Endocrine: Negative.    Genitourinary: Negative.     Musculoskeletal: Negative.    Skin: Negative.    Neurological: Negative.    Psychiatric/Behavioral: Negative.

## 2024-09-19 ENCOUNTER — TELEMEDICINE (OUTPATIENT)
Dept: SURGERY | Facility: HOSPITAL | Age: 79
End: 2024-09-19
Payer: COMMERCIAL

## 2024-09-19 DIAGNOSIS — R91.8 MASS OF UPPER LOBE OF RIGHT LUNG: Primary | ICD-10-CM

## 2024-09-19 DIAGNOSIS — D49.89 NEOPLASM OF UNSPECIFIED BEHAVIOR OF OTHER SPECIFIED SITES: ICD-10-CM

## 2024-09-19 DIAGNOSIS — R79.1 ABNORMAL COAGULATION PROFILE: ICD-10-CM

## 2024-09-19 PROCEDURE — 99443 PR PHYS/QHP TELEPHONE EVALUATION 21-30 MIN: CPT | Performed by: STUDENT IN AN ORGANIZED HEALTH CARE EDUCATION/TRAINING PROGRAM

## 2024-09-19 RX ORDER — HEPARIN SODIUM 5000 [USP'U]/ML
5000 INJECTION, SOLUTION INTRAVENOUS; SUBCUTANEOUS ONCE
OUTPATIENT
Start: 2024-09-19 | End: 2024-09-19

## 2024-09-19 RX ORDER — SODIUM CHLORIDE, SODIUM LACTATE, POTASSIUM CHLORIDE, CALCIUM CHLORIDE 600; 310; 30; 20 MG/100ML; MG/100ML; MG/100ML; MG/100ML
20 INJECTION, SOLUTION INTRAVENOUS CONTINUOUS
OUTPATIENT
Start: 2024-09-19

## 2024-09-19 NOTE — PROGRESS NOTES
C/C:  Follow up visit    Virtual - phone visit; patient and daughter have consented to telephone visit to discuss care plan    History Of Present Illness  Brianne Mallory is a 79 y.o. female h/o Lt upper lobe pT1cN0 NSCLC s/p lobectomy by Dr. Martinez on 1/21/21. She is present today for a virtual visit with her daughter to review surveillance imaging/recent changes.     Since we spoke last she has seen Dr. Sarkar, Rad Onc, and had multiple biopsies which showed no malignancy (organized pneumonia) however despite multiple rounds of abx the lesion has continued to grow concerning for underlying malignant component. She has been doing well from a respiratory standpoint since her last visit - denies any SOB, CP, cough, wheezing, or hemoptysis.   No unexplained weight loss or other acute sxs.      By way of review: patient was previously seen by my partner, Dr. Martinez - she presented as a 75yoF who was found to have a left upper lobe nodule that on bronch was biopsy + NSCLC. She reported no significant resp sxs at that time including no cough, hemoptysis or fever. This nodule was found when she was admitted to an OSH with a PE. She was treated with anticoagulation. She is now s/p VATS converted to open VALERIE for a GERTRUDE wedge with completion lobectomy for pT1cN0 NSCLC on 1/21/21    Past Medical History  She has a past medical history of Hyperlipidemia.    Social History  She reports that she has quit smoking. Her smoking use included cigarettes. She started smoking about 5 years ago. She has a 5.4 pack-year smoking history. She has never used smokeless tobacco. She reports that she does not currently use alcohol. She reports that she does not use drugs.      Medications    Current Outpatient Medications:     cholecalciferol (Vitamin D-3) 125 MCG (5000 UT) capsule, Daily, Disp: , Rfl:     levothyroxine (Synthroid, Levoxyl) 88 mcg tablet, Take 1 tablet (88 mcg) by mouth early in the morning.., Disp: , Rfl:     pantoprazole  (ProtoNix) 40 mg EC tablet, Take 1 tablet (40 mg) by mouth once daily in the morning. Take before meals. Do not crush, chew, or split., Disp: 30 tablet, Rfl: 0    sertraline (Zoloft) 25 mg tablet, Take 1 tablet (25 mg) by mouth at 3:00 in the morning., Disp: , Rfl:     simvastatin (Zocor) 40 mg tablet, Take 1 tablet (40 mg) by mouth once daily at bedtime., Disp: , Rfl:     Allergies  Aspirin, Ibuprofen, and Penicillins    Review of Systems:  Review of Systems   Constitutional: No fevers, chills, unexpected weight change  HENT: No sore throat, congestion, or nasal drainage  Eyes: No visual changes or eye itching  Respiratory:  see HPI. No cough, worsening dyspnea, wheezing  Cardiac: No chest pain, palpitations, or lower extremity edema  Gastrointestinal: No nausea, vomiting, diarrhea. No abdominal pain  Genitourinary: No dysuria or hematuria  Musculoskeletal: No back pain. No significant myalgias or arthralgias  Neurologic: No headaches, dizziness, or seizures.  Hematologic: No east bleeding or bruising.  Psychiatric: No anxiety or depression.    Physical Exam:  Physical Exam  There were no vitals taken for this visit.    Neurological:      General: No focal deficit present.      Mental Status: Patient is alert and oriented to person, place, and time.   Psychiatric:         Mood and Affect: Mood normal.       Relevant Results:     Pathology:  Component    FINAL DIAGNOSIS   A. LUNG, RIGHT UPPER LOBE; TRANSBRONCHIAL BIOPSY:      -- Non-diagnostic fragments of bronchial wall tissue. See comment   Electronically signed by Maurice Neri MD on 6/12/2024 at 0923       Component    Final Cytological Interpretation   A. LUNG, RIGHT UPPER LOBE, NODULE, FINE NEEDLE ASPIRATION, CYTOLOGY AND CELL BLOCK:  --  Non diagnostic.  Specimen consists of benign lung elements and acute and chronic inflammatory cells.  --  Correlate with concurrent biopsy surgical pathology report ([id number]).               B. LUNG, RIGHT UPPER LOBE,  NODULE, BRONCHIAL BRUSH, CYTOLOGY AND CELL BLOCK:  --  Non diagnostic.  Specimen consists of benign lung elements.  --  Correlate with concurrent biopsy surgical pathology report ([id number]).                             C. LYMPH NODE, 4 L PULMONARY, FINE NEEDLE ASPIRATION, CYTOLOGY AND CELL BLOCK:  --  No malignant cells identified.  --  Lymphoid sample.           D. LYMPH NODE, 4 R PULMONARY, FINE NEEDLE ASPIRATION, CYTOLOGY AND CELL BLOCK:  --  No malignant cells identified.  --  Lymphoid sample.                        E. LYMPH NODE,  7 PULMONARY, FINE NEEDLE ASPIRATION, CYTOLOGY AND CELL BLOCK:  --  No malignant cells identified.  --  Lymphoid sample.            F. LYMPH NODE, 11 Rs PULMONARY, FINE NEEDLE ASPIRATION, CYTOLOGY AND CELL BLOCK:  --  No malignant cells identified.  --  Lymphoid sample.                         G. LYMPH NODE, 11 Ri PULMONARY, FINE NEEDLE ASPIRATION, CYTOLOGY AND CELL BLOCK:  --  No malignant cells identified.  --  Lymphoid sample.      Electronically signed by Vaishnavi Bell MD on 6/13/2024 at 1253       Component 3 yr ago   Pathology Report Name JANELL MANJARREZ                                                                                                 Accession #: [id number]            Pathologist:                   RIGO EVERETT MD  Date of Procedure:    1/21/2021  Date Received:          1/21/2021  Date Reported           2/8/2021  Submitting Physician:   GREG BURGOS MD  Location:                    OR  Other External #                                                                   FINAL DIAGNOSIS  A.  LUNG, LEFT UPPER LOBE, WEDGE:  --INVASIVE POORLY DIFFERENTIATED ADENOCARCINOMA. SEE CASE SUMMARY REPORT.     B.  LEVEL 9 LYMPH NODE:  --LYMPH NODE WITH NO EVIDENCE OF MALIGNANCY.     C.  LEVEL 10 LYMPH NODE:  --LYMPH NODE FRAGMENTS WITH NO EVIDENCE OF MALIGNANCY.     D.  LEVEL 10 LYMPH NODE #2:  --LYMPH NODE FRAGMENTS WITH NO EVIDENCE OF MALIGNANCY.     E.   LEVEL 5 LYMPH NODE:  --LYMPH NODE FRAGMENTS WITH NO EVIDENCE OF MALIGNANCY.     F.  LEVEL 7 LYMPH NODE:  --LYMPH NODE FRAGMENTS WITH NO EVIDENCE OF MALIGNANCY.     G.  LUNG, LEFT UPPER LOBE, LOBECTOMY:    --RESIDUAL ADENOCARCINOMA. SEE CASE SUMMARY REPORT.  --3 LYMPH NODES WITH NO EVIDENCE OF MALIGNANCY.    H.  RIB:    --GROSSLY UNREMARKABLE SEGMENT OF RIB.    I.  LUNG, RIGHT LOWER LOBE, WEDGE:    --SMALL PORTION OF LUNG WITH RECENT HEMORRHAGE AND SMALL SUBPLEURAL LYMPHOID  AGGREGATE; NO EVIDENCE OF MALIGNANCY.    gwyn    The gross and/or microscopic findings were reviewed in conjunction with  pathology resident, Stephanie Jc M.D.                                                                                                                                               CASE SUMMARY REPORT       SPECIMEN  Procedure:      Wedge resection       Completion lobectomy  Specimen Laterality:      Left  TUMOR  Tumor Site:      Upper lobe of lung  Histologic Type:      Invasive adenocarcinoma, acinar predominant        Other Subtypes Present: Micropapillary, cribriform, solid  Histologic Grade:      G3: Poorly differentiated     Total Tumor Size (size of entire tumor):      Greatest Dimension  (Centimeters): 2.4 cm  Tumor Focality:      Single focus  Visceral Pleura Invasion:      Not identified  Direct Invasion of Adjacent Structures:      No adjacent structures present  Treatment Effect:      No known presurgical therapy  Lymphovascular Invasion:      Not identified  MARGINS  Margins:      All margins are uninvolved by tumor   Margins Examined:        Bronchial         Vascular         Parenchymal   Distance of Invasive Carcinoma from Closest Margin (Centimeters):          3.0 cm    Closest Margin:         Bronchial  LYMPH NODES  Regional Lymph Nodes:   Number of Lymph Nodes Involved:        0   Number of Lymph Nodes Examined:        At least: 8    John Stations Examined:         7: Subcarinal          5: Subaortic /  "aortopulmonary (AP) / AP window          9L: Pulmonary ligament          10L: Hilar          12L: Lobar          Left: Intraparenchymal  PATHOLOGIC STAGE CLASSIFICATION (pTNM, AJCC 8th Edition)  Primary Tumor (pT):      pT1c  Regional Lymph Nodes (pN):      pN0  ADDITIONAL FINDINGS  Additional Findings:      Inflammation (type): Patchy chronic interstitial  inflammation       Emphysema        Respiratory bronchiolitis; recent hemorrhage  ADDITIONAL TESTING  REPRESENTATIVE BLOCKS:      Normal Block: G8       Tumor Block: A3       Imaging:    No results found.     Pulmonary Functions Testing Results:    No results found for: \"FEV1\", \"FVC\", \"IVU2GFP\", \"TLC\", \"DLCO\"    Recent  CT Chest was personally reviewed     Assessment/Plan   Problem List Items Addressed This Visit    None          Brianne Mallory is a 79 y.o. female with a 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 with completion lobectomy on 1/21/21 with Dr. Martinez for a pT1cN0 NSCLC. She is present today for a virtual visit with her daughter to review surveillance imaging/recent changes.     Since we spoke last she has seen Dr. Sarkar, Rad Onc, and had multiple biopsies which showed no malignancy (organized pneumonia) however despite multiple rounds of abx the lesion has continued to grow concerning for underlying malignant component.     I discussed with patient and daughter today the ongoing concern after MDTB presentations about a missed malignancy and surgical option. We discussed briefly potential of anti-fungal intervention but I would want ID on board. Radiation may not be the best option at this point in time. From a surgical standpoint: I discussed with the patient & daughter the potential plan for surgery. During the discussion we covered the benefits/alternatives/risks of the procedure including but not limited to arrhythmias including atrial fibrillation, DVT/PE, air leak, possibility of oxygen requirements, additional " surgery/resection. Patient & daughter understand and  ultimately wish to proceed.  Will plan for ECHO, PAT and Rt robotic upper [place] [facility] [place]      Gissell Pacheco, DO  Thoracic & Esophageal Surgery

## 2024-10-03 ENCOUNTER — CLINICAL SUPPORT (OUTPATIENT)
Dept: PREADMISSION TESTING | Facility: HOSPITAL | Age: 79
End: 2024-10-03
Payer: MEDICARE

## 2024-10-03 NOTE — CPM/PAT NURSE NOTE
CPM/PAT Nurse Note      Name: Brianne Mallory (Brianne Mallory)  /Age: 1945/79 y.o.     Brianne Mallory is scheduled for Resection Robot-Assisted Lung Lobe - Right on 10/23/24    **Data input  Past Medical History:   Diagnosis Date    Anxiety     GERD (gastroesophageal reflux disease)     Hyperlipidemia     Hypothyroidism        Past Surgical History:   Procedure Laterality Date    BRONCHOSCOPY      KNEE ARTHROPLASTY      OTHER SURGICAL HISTORY  2020    Biopsy    OTHER SURGICAL HISTORY Left 2021    GERTRUDE Lung lobectomy       Patient  has no history on file for sexual activity.    Family History   Problem Relation Name Age of Onset    Lung cancer Maternal Grandmother         Allergies   Allergen Reactions    Aspirin Hives    Ibuprofen Hives    Penicillins Rash     mild; occurred approx 20 years ago       Prior to Admission medications    Medication Sig Start Date End Date Taking? Authorizing Provider   cholecalciferol (Vitamin D-3) 125 MCG (5000 UT) capsule Daily 24   Historical Provider, MD   levothyroxine (Synthroid, Levoxyl) 88 mcg tablet Take 1 tablet (88 mcg) by mouth early in the morning.. 20   Historical Provider, MD   pantoprazole (ProtoNix) 40 mg EC tablet Take 1 tablet (40 mg) by mouth once daily in the morning. Take before meals. Do not crush, chew, or split. 24  Herb Kramer MD, MS   sertraline (Zoloft) 25 mg tablet Take 1 tablet (25 mg) by mouth at 3:00 in the morning. 1/15/24   Historical Provider, MD   simvastatin (Zocor) 40 mg tablet Take 1 tablet (40 mg) by mouth once daily at bedtime. 20   Historical Provider, MD QUINTANILLA ROS     DASI Risk Score    No data to display       Caprini DVT Assessment    No data to display       Modified Frailty Index    No data to display       CHADS2 Stroke Risk  Current as of 3 hours ago        N/A 3 to 100%: High Risk   2 to < 3%: Medium Risk   0 to < 2%: Low Risk     Last Change: N/A          This score determines the  patient's risk of having a stroke if the patient has atrial fibrillation.        This score is not applicable to this patient. Components are not calculated.          Revised Cardiac Risk Index    No data to display       Apfel Simplified Score    No data to display       Risk Analysis Index Results This Encounter    No data found in the last 10 encounters.     Providers:                                                                                                             PCP: Deborah Schneider at [facility] , LOV 24 for Left ankle sprain     Optometrist:Roxanna Day at [facility], LOV 23 post op visit for cataract surgery and refraction.       Pulmonology: Molly Arango  CNP, 24 evaluation with concerns of new RUL nodule she has a h/o Lt upper lobe pT1cN0 NSCLC s/p lobectomy by Dr. Martinez on 21, needs EBUS for diagnosis. Surveillance imaging today showed a new 2.3 cm right upper lobe spiculated mass which is concerning in appearance.         Onc: Herb Kramer 24 Evaluation and management of a rapidly growing lesion in the RUL. However multiple attempts at biopsy were negative (both endobronchial and CT guided biopsy). This lesion was not present last year. This is unusual for NSCLC to grow this rapidly and not have tissue on biopsy in my experience.       ThoracicSx: Gissell Sinjonathan 24 presents to review surveillance imaging/recent changes of new RUL lesion that is PET positive nodule that did not show malignancy on a bronchoscopy and CT guided biopsy did not show any malignant cells.   H/o 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 with completion lobectomy on 21 with Dr. Martinez         --TESTING:      - Echo: Scheduled for 10/09/24    - EK23 at [facility], see media        - CT Calcium scoring: 10/07/20  IMPRESSION:  1. Coronary artery calcium score of 369*.  2. HOWARD 79th percentile** for age, gender, and race in  asymptomatic  patients.      - CT Chest: 09/04/24  IMPRESSION:  1. Further increase in the size of the spiculated right upper lobe  mass measuring currently 4.8 x 3.3 cm in comparison to 3.2 x 3.2 cm with surrounding right upper lobe interstitial septal thickening and scattered micro nodules. Finding would still be concerning for neoplastic process despite the previous negative biopsy findings given continued growth.  2. Stable changes of left upper lobectomy.  3. Prominent right lower paratracheal lymph node measuring 1 cm which showed FDG uptake in prior PET scan dated 05/16/2020.  4. Minimal hepatic contour irregularity. This finding is nonspecific, however advise correlation with liver function tests.  5. Uncomplicated cholelithiasis.  6. Partially visualized left cortical renal hypoattenuating lesion  measuring 2.9 cm likely cyst.      - PET CT: 05/16/24  IMPRESSION:  1. Previously described spiculated nodule in the right upper lobe  demonstrates intense hypermetabolic activity and is most compatible with malignancy. Intensely hypermetabolic station 4R lymph nodes are most consistent with metastatic disease.  2. Postsurgical changes of left upper lobectomy without evidence of abnormal hypermetabolic activity along the resection site to indicate recurrent disease.  3. No evidence of FDG avid distant metastatic disease.      - PFTs: 08/15/24  The FEV1/FVC is reduced. The FEV1 is normal. The FVC is normal. The DLCO is normal; however, the diffusing capacity was not corrected for the patient's hemoglobin. The airways resistance is normal. Conclusion: Reduced FEV1/FVC with a normal FVC indicates a possible airflow obstructive defect. The DLCO is normal. Mild COPD.         _______________________________________________________________  **Data input only. No medications, history or providers verified           Cecelia Costello LPN  Preadmission Testing

## 2024-10-09 ENCOUNTER — PRE-ADMISSION TESTING (OUTPATIENT)
Dept: PREADMISSION TESTING | Facility: HOSPITAL | Age: 79
End: 2024-10-09
Payer: MEDICARE

## 2024-10-09 ENCOUNTER — HOSPITAL ENCOUNTER (OUTPATIENT)
Dept: CARDIOLOGY | Facility: HOSPITAL | Age: 79
Discharge: HOME | End: 2024-10-09
Payer: MEDICARE

## 2024-10-09 VITALS
HEIGHT: 68 IN | DIASTOLIC BLOOD PRESSURE: 71 MMHG | SYSTOLIC BLOOD PRESSURE: 126 MMHG | OXYGEN SATURATION: 99 % | WEIGHT: 219.8 LBS | TEMPERATURE: 97.3 F | BODY MASS INDEX: 33.31 KG/M2 | HEART RATE: 62 BPM

## 2024-10-09 DIAGNOSIS — R91.8 MASS OF UPPER LOBE OF RIGHT LUNG: ICD-10-CM

## 2024-10-09 DIAGNOSIS — C34.12 MALIGNANT NEOPLASM OF UPPER LOBE OF LEFT LUNG (MULTI): Primary | ICD-10-CM

## 2024-10-09 DIAGNOSIS — D49.89 NEOPLASM OF UNSPECIFIED BEHAVIOR OF OTHER SPECIFIED SITES: ICD-10-CM

## 2024-10-09 DIAGNOSIS — E03.9 HYPOTHYROIDISM, UNSPECIFIED TYPE: Primary | ICD-10-CM

## 2024-10-09 DIAGNOSIS — D48.9 NEOPLASM OF UNCERTAIN BEHAVIOR, UNSPECIFIED: ICD-10-CM

## 2024-10-09 DIAGNOSIS — R79.1 ABNORMAL COAGULATION PROFILE: ICD-10-CM

## 2024-10-09 PROBLEM — Z85.29 HISTORY OF MALIGNANT NEOPLASM OF THORACIC CAVITY STRUCTURE: Status: ACTIVE | Noted: 2024-10-09

## 2024-10-09 PROBLEM — E53.8 B12 DEFICIENCY: Status: ACTIVE | Noted: 2024-10-09

## 2024-10-09 PROBLEM — E55.9 VITAMIN D DEFICIENCY: Status: ACTIVE | Noted: 2024-10-09

## 2024-10-09 PROBLEM — D50.0 ANEMIA DUE TO BLOOD LOSS: Status: ACTIVE | Noted: 2024-10-09

## 2024-10-09 PROBLEM — I10 ESSENTIAL HYPERTENSION: Status: ACTIVE | Noted: 2024-10-09

## 2024-10-09 PROBLEM — M17.12 OSTEOARTHRITIS OF LEFT KNEE: Status: ACTIVE | Noted: 2024-10-09

## 2024-10-09 PROBLEM — Z86.711 HISTORY OF PULMONARY EMBOLISM: Status: ACTIVE | Noted: 2022-02-10

## 2024-10-09 PROBLEM — I82.401 DEEP VEIN THROMBOSIS (DVT) OF RIGHT LOWER EXTREMITY (MULTI): Status: ACTIVE | Noted: 2022-01-27

## 2024-10-09 PROBLEM — R00.1 BRADYCARDIA: Status: ACTIVE | Noted: 2024-10-09

## 2024-10-09 PROBLEM — Z96.652 STATUS POST TOTAL LEFT KNEE REPLACEMENT: Status: ACTIVE | Noted: 2022-02-10

## 2024-10-09 PROBLEM — R55 SYNCOPE AND COLLAPSE: Status: ACTIVE | Noted: 2020-09-21

## 2024-10-09 PROBLEM — R53.1 WEAKNESS: Status: ACTIVE | Noted: 2024-10-09

## 2024-10-09 PROBLEM — E78.5 HLD (HYPERLIPIDEMIA): Status: ACTIVE | Noted: 2024-10-09

## 2024-10-09 PROBLEM — R60.0 EDEMA OF LOWER EXTREMITY: Status: ACTIVE | Noted: 2024-10-09

## 2024-10-09 PROBLEM — R41.3 MEMORY CHANGES: Status: ACTIVE | Noted: 2024-10-09

## 2024-10-09 PROBLEM — F32.A DEPRESSION: Status: ACTIVE | Noted: 2024-10-09

## 2024-10-09 LAB
ABO GROUP (TYPE) IN BLOOD: NORMAL
ALBUMIN SERPL BCP-MCNC: 4.2 G/DL (ref 3.4–5)
ALP SERPL-CCNC: 62 U/L (ref 33–136)
ALT SERPL W P-5'-P-CCNC: 6 U/L (ref 7–45)
ANION GAP SERPL CALC-SCNC: 14 MMOL/L (ref 10–20)
ANTIBODY SCREEN: NORMAL
APTT PPP: 41 SECONDS (ref 27–38)
AST SERPL W P-5'-P-CCNC: 14 U/L (ref 9–39)
BILIRUB SERPL-MCNC: 0.3 MG/DL (ref 0–1.2)
BUN SERPL-MCNC: 15 MG/DL (ref 6–23)
CALCIUM SERPL-MCNC: 9.9 MG/DL (ref 8.6–10.6)
CHLORIDE SERPL-SCNC: 101 MMOL/L (ref 98–107)
CO2 SERPL-SCNC: 30 MMOL/L (ref 21–32)
CREAT SERPL-MCNC: 0.73 MG/DL (ref 0.5–1.05)
EGFRCR SERPLBLD CKD-EPI 2021: 83 ML/MIN/1.73M*2
ERYTHROCYTE [DISTWIDTH] IN BLOOD BY AUTOMATED COUNT: 12.8 % (ref 11.5–14.5)
GLUCOSE SERPL-MCNC: 82 MG/DL (ref 74–99)
HCT VFR BLD AUTO: 41.9 % (ref 36–46)
HGB BLD-MCNC: 13.2 G/DL (ref 12–16)
INR PPP: 1.1 (ref 0.9–1.1)
MCH RBC QN AUTO: 29.7 PG (ref 26–34)
MCHC RBC AUTO-ENTMCNC: 31.5 G/DL (ref 32–36)
MCV RBC AUTO: 94 FL (ref 80–100)
NRBC BLD-RTO: 0 /100 WBCS (ref 0–0)
PLATELET # BLD AUTO: 440 X10*3/UL (ref 150–450)
POTASSIUM SERPL-SCNC: 4.7 MMOL/L (ref 3.5–5.3)
PROT SERPL-MCNC: 7.1 G/DL (ref 6.4–8.2)
PROTHROMBIN TIME: 12 SECONDS (ref 9.8–12.8)
RBC # BLD AUTO: 4.44 X10*6/UL (ref 4–5.2)
RH FACTOR (ANTIGEN D): NORMAL
SODIUM SERPL-SCNC: 140 MMOL/L (ref 136–145)
TSH SERPL-ACNC: 2.17 MIU/L (ref 0.44–3.98)
WBC # BLD AUTO: 9.2 X10*3/UL (ref 4.4–11.3)

## 2024-10-09 PROCEDURE — 93005 ELECTROCARDIOGRAM TRACING: CPT

## 2024-10-09 PROCEDURE — 93010 ELECTROCARDIOGRAM REPORT: CPT | Performed by: INTERNAL MEDICINE

## 2024-10-09 PROCEDURE — 93306 TTE W/DOPPLER COMPLETE: CPT | Performed by: INTERNAL MEDICINE

## 2024-10-09 PROCEDURE — 36415 COLL VENOUS BLD VENIPUNCTURE: CPT

## 2024-10-09 PROCEDURE — 99205 OFFICE O/P NEW HI 60 MIN: CPT | Performed by: NURSE PRACTITIONER

## 2024-10-09 PROCEDURE — 85730 THROMBOPLASTIN TIME PARTIAL: CPT

## 2024-10-09 PROCEDURE — 93306 TTE W/DOPPLER COMPLETE: CPT

## 2024-10-09 PROCEDURE — 87081 CULTURE SCREEN ONLY: CPT

## 2024-10-09 PROCEDURE — 84075 ASSAY ALKALINE PHOSPHATASE: CPT

## 2024-10-09 PROCEDURE — 85027 COMPLETE CBC AUTOMATED: CPT

## 2024-10-09 PROCEDURE — 86870 RBC ANTIBODY IDENTIFICATION: CPT

## 2024-10-09 PROCEDURE — 86900 BLOOD TYPING SEROLOGIC ABO: CPT

## 2024-10-09 PROCEDURE — 84443 ASSAY THYROID STIM HORMONE: CPT

## 2024-10-09 RX ORDER — CHLORHEXIDINE GLUCONATE ORAL RINSE 1.2 MG/ML
SOLUTION DENTAL
Qty: 15 ML | Refills: 0 | Status: SHIPPED | OUTPATIENT
Start: 2024-10-09

## 2024-10-09 RX ORDER — CHLORHEXIDINE GLUCONATE 40 MG/ML
SOLUTION TOPICAL
Qty: 473 ML | Refills: 0 | Status: SHIPPED | OUTPATIENT
Start: 2024-10-09

## 2024-10-09 ASSESSMENT — DUKE ACTIVITY SCORE INDEX (DASI)
CAN YOU RUN A SHORT DISTANCE: NO
CAN YOU HAVE SEXUAL RELATIONS: NO
CAN YOU TAKE CARE OF YOURSELF (EAT, DRESS, BATHE, OR USE TOILET): YES
CAN YOU DO HEAVY WORK AROUND THE HOUSE LIKE SCRUBBING FLOORS OR LIFTING AND MOVING HEAVY FURNITURE: NO
CAN YOU CLIMB A FLIGHT OF STAIRS OR WALK UP A HILL: YES
CAN YOU WALK A BLOCK OR TWO ON LEVEL GROUND: NO
TOTAL_SCORE: 16.2
CAN YOU DO YARD WORK LIKE RAKING LEAVES, WEEDING OR PUSHING A MOWER: NO
DASI METS SCORE: 4.7
CAN YOU WALK INDOORS, SUCH AS AROUND YOUR HOUSE: YES
CAN YOU DO MODERATE WORK AROUND THE HOUSE LIKE VACUUMING, SWEEPING FLOORS OR CARRYING GROCERIES: YES
CAN YOU PARTICIPATE IN STRENOUS SPORTS LIKE SWIMMING, SINGLES TENNIS, FOOTBALL, BASKETBALL, OR SKIING: NO
CAN YOU PARTICIPATE IN MODERATE RECREATIONAL ACTIVITIES LIKE GOLF, BOWLING, DANCING, DOUBLES TENNIS OR THROWING A BASEBALL OR FOOTBALL: NO
CAN YOU DO LIGHT WORK AROUND THE HOUSE LIKE DUSTING OR WASHING DISHES: YES

## 2024-10-09 ASSESSMENT — ENCOUNTER SYMPTOMS
ARTHRALGIAS: 1
GASTROINTESTINAL NEGATIVE: 1
RESPIRATORY NEGATIVE: 1
NEUROLOGICAL NEGATIVE: 1
CARDIOVASCULAR NEGATIVE: 1
ENDOCRINE NEGATIVE: 1
NECK NEGATIVE: 1
CONSTITUTIONAL NEGATIVE: 1
EYES NEGATIVE: 1

## 2024-10-09 ASSESSMENT — LIFESTYLE VARIABLES: SMOKING_STATUS: NONSMOKER

## 2024-10-09 NOTE — PREPROCEDURE INSTRUCTIONS
Fasting Guidelines    NPO Instructions:    Do not eat any food after midnight the night before your surgery/procedure.  You may have up to 13.5 ounces of clear liquids until TWO hours before your instructed arrival time to the hospital. This includes water, black tea/coffee, (no milk or cream), apple juice, and/or electrolyte drinks (Gatorade).  You may chew gum up to TWO hours before your surgery/procedure.    Additional Instructions:     We have sent a prescription for Hibiclens soap and Peridex mouth wash to your preferred pharmacy.  If you have not already, Please  your prescription and start using as directed before surgery.  Follow the instruction sheet provided to you at your CPM/PAT appointment.    Avoid herbal supplements, multivitamins and NSAIDS (non-steroidal anti-inflammatory drugs) such as Advil, Aleve, Ibuprofen, Naproxen, Excedrin, Meloxicam or Celebrex for at least 7 days prior to surgery. May take Tylenol as needed.    Avoid tobacco and alcohol products for 24 hours prior to surgery.    CONTACT SURGEON'S OFFICE IF YOU DEVELOP:  * Fever = 100.4 F   * New respiratory symptoms (e.g. cough, shortness of breath, respiratory distress, sore throat)  * Recent loss of taste or smell  *Flu like symptoms such as headache, fatigue or gastrointestinal symptoms  * You develop any open sores, shingles, burning or painful urination   AND/OR:  * You no longer wish to have the surgery.  * Any other personal circumstances change that may lead to the need to cancel or defer this surgery.  *You were admitted to any hospital within one week of your planned procedure.    Seven/Six Days before Surgery:  Review your medication instructions, stop indicated medications    Day of Surgery:  Review your medication instructions, take indicated medications  Wear comfortable loose fitting clothing  Do not use moisturizers, creams, lotions or perfume  All jewelry and valuables should be left at home    Douglas Thorpe  [facility]  Dguke-[phone]  Ppf-[phone]  Email-[email or web address]      Patient Information: Pre-Operative Infection Prevention Measures     Why did I have my nose, under my arms, and groin swabbed?  The purpose of the swab is to identify Staphylococcus aureus inside your nose or on your skin.  The swab was sent to the laboratory for culture.  A positive swab/culture for Staphylococcus aureus is called colonization or carriage.      What is Staphylococcus aureus?  Staphylococcus aureus, also known as “staph”, is a germ found on the skin or in the nose of healthy people.  Sometimes Staphylococcus aureus can get into the body and cause an infection.  This can be minor (such as pimples, boils, or other skin problems).  It might also be serious (such as a blood infection, pneumonia, or a surgical site infection).    What is Staphylococcus aureus colonization or carriage?  Colonization or carriage means that a person has the germ but is not sick from it.  These bacteria can be spread on the hands or when breathing or sneezing.    How is Staphylococcus aureus spread?  It is most often spread by close contact with a person or item that carries it.    What happens if my culture is positive for Staphylococcus aureus?  Your doctor/medical team will use this information to guide any antibiotic treatment which may be necessary.  Regardless of the culture results, we will clean the inside of your nose with a betadine swab just before you have your surgery.      Will I get an infection if I have Staphylococcus aureus in my nose or on my skin?  Anyone can get an infection with Staphylococcus aureus.  However, the best way to reduce your risk of infection is to follow the instructions provided to you for the use of your CHG soap and dental rinse.        Patient Information: Oral/Dental Rinse    What is oral/dental rinse?   It is a mouthwash. It is a way of cleaning the mouth with a  germ-killing solution before your surgery.  The solution contains chlorhexidine, commonly known as CHG.   It is used inside the mouth to kill a bacteria known as Staphylococcus aureus.  Let your doctor know if you are allergic to Chlorhexidine.    Why do I need to use CHG oral/dental rinse?  The CHG oral/dental rinse helps to kill a bacteria in your mouth known as Staphylococcus aureus.     This reduces the risk of infection at the surgical site.      Using your CHG oral/dental rinse  STEPS:  Use your CHG oral/dental rinse after you brush your teeth the night before (at bedtime) and the morning of your surgery.  Follow all directions on your prescription label.    Use the cap on the container to measure 15ml   Swish (gargle if you can) the mouthwash in your mouth for at least 30 seconds, (do not swallow) and spit out  After you use your CHG rinse, do not rinse your mouth with water, drink or eat.  Please refer to the prescription label for the appropriate time to resume oral intake      What side effects might I have using the CHG oral/dental rinse?  CHG rinse will stick to plaque on the teeth.  Brush and floss just before use.  Teeth brushing will help avoid staining of plaque during use.      Patient Information: Home Preoperative Antibacterial Shower      What is a home preoperative antibacterial shower?  This shower is a way of cleaning the skin with a germ-killing solution before surgery.  The solution contains chlorhexidine, commonly known as CHG.  CHG is a skin cleanser with germ-killing ability.  Let your doctor know if you are allergic to chlorhexidine.    Why do I need to take a preoperative antibacterial shower?  Skin is not sterile.  It is best to try to make your skin as free of germs as possible before surgery.  Proper cleansing with a germ-killing soap before surgery can lower the number of germs on your skin.  This helps to reduce the risk of infection at the surgical site.  Following the  instructions listed below will help you prepare your skin for surgery.      How do I use the solution?  Steps:  Begin using your CHG soap 5 days before your scheduled surgery on ________________________.    First, wash and rinse your hair using the CHG soap. Keep CHG soap away from ear canals and eyes.  Rinse completely, do not condition.  Hair extensions should be removed.  Wash your face with your normal soap and rinse.    Apply the CHG solution to a clean wet washcloth.  Turn the water off or move away from the water spray to avoid premature rinsing of the CHG soap as you are applying.   Firmly lather your entire body from the neck down.  Do not use on your face.  Pay special attention to the area(s) where your incision(s) will be located unless they are on your face.  Avoid scrubbing your skin too hard.  The important point is to have the CHG soap sit on your skin for 3 minutes.    When the 3 minutes are up, turn on the water and rinse the CHG solution off your body completely.   DO NOT wash with regular soap after you have used the CHG soap solution  Pat yourself dry with a clean, freshly-laundered towel.  DO NOT apply powders, deodorants, or lotions.  Dress in clean, freshly laundered nightclothes.    Be sure to sleep with clean, freshly laundered sheets.  Be aware that CHG will cause stains on fabrics; if you wash them with bleach after use.  Rinse your washcloth and other linens that have contact with CHG completely.  Use only non-chlorine detergents to launder the items used.   The morning of surgery is the fifth day.  Repeat the above steps and dress in clean comfortable clothing     Whom should I contact if I have any questions regarding the use of CHG soap?  Call the [facility] at [phone] if you have any questions.               Preoperative Brain Exercises    What are brain exercises?  A brain exercise is any activity that  engages your thinking (cognitive) skills.    What types of activities are considered brain exercises?  Jigsaw puzzles, crossword puzzles, word jumble, memory games, word search, and many more.  Many can be found free online or on your phone via a mobile apoorva.    Why should I do brain exercises before my surgery?  More recent research has shown brain exercise before surgery can lower the risk of postoperative delirium (confusion) which can be especially important for older adults.  Patients who did brain exercises for 5 to 10 hours the days before surgery, cut their risk of postoperative delirium in half up to 1 week after surgery.         [facility]    Preoperative Deep Breathing Exercises    Why it is important to do deep breathing exercises before my surgery?  Deep breathing exercises strengthen your breathing muscles.  This helps you to recover after your surgery and decreases the chance of breathing complications.      How are the deep breathing exercises done?  Sit straight with your back supported.  Breathe in deeply and slowly through your nose. Your lower rib cage should expand and your abdomen may move forward.  Hold that breath for 3 to 5 seconds.  Breathe out through pursed lips, slowly and completely.  Rest and repeat 10 times every hour while awake.  Rest longer if you become dizzy or lightheaded.         Patient and Family Education             Ways You Can Help Prevent Blood Clots             This handout explains some simple things you can do to help prevent blood clots.      Blood clots are blockages that can form in the body's veins. When a blood clot forms in your deep veins, it may be called a deep vein thrombosis, or DVT for short. Blood clots can happen in any part of the body where blood flows, but they are most common in the arms and legs. If a piece of a blood clot breaks free and travels to the lungs, it is called a pulmonary embolus (PE). A PE can be a very  serious problem.         Being in the hospital or having surgery can raise your chances of getting a blood clot because you may not be well enough to move around as much as you normally do.         Ways you can help prevent blood clots in the hospital         Wearing SCDs. SCDs stands for Sequential Compression Devices.   SCDs are special sleeves that wrap around your legs  They attach to a pump that fills them with air to gently squeeze your legs every few minutes.   This helps return the blood in your legs to your heart.   SCDs should only be taken off when walking or bathing.   SCDs may not be comfortable, but they can help save your life.               Wearing compression stockings - if your doctor orders them. These special snug fitting stockings gently squeeze your legs to help blood flow.       Walking. Walking helps move the blood in your legs.   If your doctor says it is ok, try walking the halls at least   5 times a day. Ask us to help you get up, so you don't fall.      Taking any blood thinning medicines your doctor orders.        Page 1 of 2     [facility]; 3/23   Ways you can help prevent blood clots at home       Wearing compression stockings - if your doctor orders them. ? Walking - to help move the blood in your legs.       Taking any blood thinning medicines your doctor orders.      Signs of a blood clot or PE      Tell your doctor or nurse know right away if you have of the problems listed below.    If you are at home, seek medical care right away. Call 911 for chest pain or problems breathing.               Signs of a blood clot (DVT) - such as pain,  swelling, redness or warmth in your arm or leg      Signs of a pulmonary embolism (PE) - such as chest     pain or feeling short of breath

## 2024-10-09 NOTE — CPM/PAT H&P
CPM/PAT Evaluation       Name: Brianne Mallory (Brianne Mallory)  /Age: 1945/79 y.o.     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 Now with RUL lesion that is PET positive, biopsy negative for malignant cells.  She presents today for perioperative evaluation in anticipation of Resection Robot-Assisted Lung Lobe - Right on 10/23/24 with Dr. Pacheco.    Past Medical History:   Diagnosis Date    Anxiety     Cataract     Clotting disorder (Multi)     COPD (chronic obstructive pulmonary disease) (Multi)     Coronary artery calcification seen on CT scan     GERD (gastroesophageal reflux disease)     History of blood transfusion     History of cancer of upper lobe bronchus or lung     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 with completion lobectomy on 21    Hyperlipidemia     Hypertension     Hypothyroidism     Mass of upper lobe of right lung     RUL mass measuring currently 4.8 x 3.3 cm    Sprain of ankle, left 2024       Past Surgical History:   Procedure Laterality Date    BRONCHOSCOPY      CATARACT EXTRACTION      KNEE ARTHROPLASTY      right an dleft    OTHER SURGICAL HISTORY  2020    Biopsy    OTHER SURGICAL HISTORY Left 2021    GERTRUDE Lung lobectomy       Patient Sexual activity questions deferred to the physician.    Family History   Problem Relation Name Age of Onset    Hypertension Mother      Heart attack Father      Lung cancer Maternal Grandmother      Diabetes Paternal Grandmother         Allergies   Allergen Reactions    Aspirin Hives    Ibuprofen Hives    Nickel Unknown    Sulfa (Sulfonamide Antibiotics) Unknown    Penicillins Rash     mild; occurred approx 20 years ago       Prior to Admission medications    Medication Sig Start Date End Date Taking? Authorizing Provider   cholecalciferol (Vitamin D-3) 125 MCG (5000 UT) capsule  Daily 5/29/24   Historical Provider, MD   levothyroxine (Synthroid, Levoxyl) 88 mcg tablet Take 1 tablet (88 mcg) by mouth early in the morning.. 12/6/20   Historical Provider, MD   pantoprazole (ProtoNix) 40 mg EC tablet Take 1 tablet (40 mg) by mouth once daily in the morning. Take before meals. Do not crush, chew, or split. 8/21/24 9/20/24  Herb Kramer MD, MS   sertraline (Zoloft) 25 mg tablet Take 1 tablet (25 mg) by mouth at 3:00 in the morning. 1/15/24   Historical Provider, MD   simvastatin (Zocor) 40 mg tablet Take 1 tablet (40 mg) by mouth once daily at bedtime. 12/6/20   Historical Provider, MD QUINTANILLA ROS:   Constitutional:   neg    Neuro/Psych:   neg    Eyes:   neg    Ears:    hearing loss   hearing aides  Nose:   neg    Mouth:   neg    Throat:   neg    Neck:   neg    Cardio:   neg    Respiratory:   neg    Endocrine:   neg    GI:   neg    :   neg    Musculoskeletal:    arthralgias  Hematologic:   neg    Skin:  neg        Physical Exam  Vitals reviewed.   Constitutional:       Appearance: Normal appearance.   HENT:      Head: Normocephalic and atraumatic.      Nose: Nose normal.      Mouth/Throat:      Mouth: Mucous membranes are moist.      Pharynx: Oropharynx is clear.   Eyes:      Extraocular Movements: Extraocular movements intact.      Conjunctiva/sclera: Conjunctivae normal.      Pupils: Pupils are equal, round, and reactive to light.   Cardiovascular:      Rate and Rhythm: Normal rate and regular rhythm.      Pulses: Normal pulses.      Heart sounds: Normal heart sounds.   Pulmonary:      Effort: Pulmonary effort is normal.      Breath sounds: Normal breath sounds.   Musculoskeletal:         General: Normal range of motion.      Cervical back: Normal range of motion.   Skin:     General: Skin is warm and dry.   Neurological:      General: No focal deficit present.      Mental Status: She is alert and oriented to person, place, and time.      Gait: Gait abnormal.      Comments: Uses cane  "  Psychiatric:         Mood and Affect: Mood normal.         Behavior: Behavior normal.          PAT AIRWAY:   Airway:     Mallampati::  III    TM distance::  >3 FB    Neck ROM::  Full   upper dentures and lower dentures      Visit Vitals  /71   Pulse 62   Temp 36.3 °C (97.3 °F)   Ht 1.727 m (5' 8\")   Wt 99.7 kg (219 lb 12.8 oz)   SpO2 99%   BMI 33.42 kg/m²   Smoking Status Former   BSA 2.19 m²          DASI Risk Score      Flowsheet Row Pre-Admission Testing from 10/9/2024 in [facility]   Can you take care of yourself (eat, dress, bathe, or use toilet)?  2.75 filed at 10/09/2024 1438   Can you walk indoors, such as around your house? 1.75 filed at 10/09/2024 1438   Can you walk a block or two on level ground?  0 filed at 10/09/2024 1438   Can you climb a flight of stairs or walk up a hill? 5.5 filed at 10/09/2024 1438   Can you run a short distance? 0 filed at 10/09/2024 1438   Can you do light work around the house like dusting or washing dishes? 2.7 filed at 10/09/2024 1438   Can you do moderate work around the house like vacuuming, sweeping floors or carrying groceries? 3.5 filed at 10/09/2024 1438   Can you do heavy work around the house like scrubbing floors or lifting and moving heavy furniture?  0 filed at 10/09/2024 1438   Can you do yard work like raking leaves, weeding or pushing a mower? 0 filed at 10/09/2024 1438   Can you have sexual relations? 0 filed at 10/09/2024 1438   Can you participate in moderate recreational activities like golf, bowling, dancing, doubles tennis or throwing a baseball or football? 0 filed at 10/09/2024 1438   Can you participate in strenous sports like swimming, singles tennis, football, basketball, or skiing? 0 filed at 10/09/2024 1438   DASI SCORE 16.2 filed at 10/09/2024 1438   METS Score (Will be calculated only when all the questions are answered) 4.7 filed at 10/09/2024 1438          Caprini DVT Assessment      Flowsheet Row Pre-Admission Testing " from 10/9/2024 in [facility]   DVT Score 13 filed at 10/09/2024 1455   Medical Factors Present cancer, chemotherapy, or previous malignancy, History of DVT/PE filed at 10/09/2024 1455   Surgical Factors Major surgery planned, lasting 2-3 hours filed at 10/09/2024 1455   BMI 31-40 (Obesity) filed at 10/09/2024 1455          Modified Frailty Index      Flowsheet Row Pre-Admission Testing from 10/9/2024 in [facility]   Non-independent functional status (problems with dressing, bathing, personal grooming, or cooking) 0 filed at 10/09/2024 1456   History of diabetes mellitus  0 filed at 10/09/2024 1456   History of COPD 0.0909 filed at 10/09/2024 1456   History of CHF No filed at 10/09/2024 1456   History of MI 0 filed at 10/09/2024 1456   History of Percutaneous Coronary Intervention, Cardiac Surgery, or Angina No filed at 10/09/2024 1456   Hypertension requiring the use of medication  0.0909 filed at 10/09/2024 1456   Peripheral vascular disease 0 filed at 10/09/2024 1456   Impaired sensorium (cognitive impairement or loss, clouding, or delirium) 0 filed at 10/09/2024 1456   TIA or CVA withouy residual deficit 0 filed at 10/09/2024 1456   Cerebrovascular accident with deficit 0 filed at 10/09/2024 1456   Modified Frailty Index Calculator .1818 filed at 10/09/2024 1456          CHADS2 Stroke Risk  Current as of 5 hours ago        N/A 3 to 100%: High Risk   2 to < 3%: Medium Risk   0 to < 2%: Low Risk     Last Change: N/A          This score determines the patient's risk of having a stroke if the patient has atrial fibrillation.        This score is not applicable to this patient. Components are not calculated.          Revised Cardiac Risk Index      Flowsheet Row Pre-Admission Testing from 10/9/2024 in [facility]   High-Risk Surgery (Intraperitoneal, Intrathoracic,Suprainguinal vascular) 1 filed at 10/09/2024 1456   History of ischemic heart disease (History of MI,  History of positive exercuse test, Current chest paint considered due to myocardial ischemia, Use of nitrate therapy, ECG with pathological Q Waves) 1 filed at 10/09/2024 1456   History of congestive heart failure (pulmonary edemia, bilateral rales or S3 gallop, Paroxysmal nocturnal dyspnea, CXR showing pulmonary vascular redistribution) 0 filed at 10/09/2024 1456   History of cerebrovascular disease (Prior TIA or stroke) 0 filed at 10/09/2024 1456   Pre-operative insulin treatment 0 filed at 10/09/2024 1456   Pre-operative creatinine>2 mg/dl 0 filed at 10/09/2024 1456   Revised Cardiac Risk Calculator 2 filed at 10/09/2024 1456          Apfel Simplified Score      Flowsheet Row Pre-Admission Testing from 10/9/2024 in [facility]   Smoking status 1 filed at 10/09/2024 1456   History of motion sickness or PONV  0 filed at 10/09/2024 1456   Use of postoperative opioids 1 filed at 10/09/2024 1456   Gender - Female 1=Yes filed at 10/09/2024 1456   Apfel Simplified Score Calculator 3 filed at 10/09/2024 1456          Risk Analysis Index Results This Encounter    No data found in the last 10 encounters.       Stop Bang Score      Flowsheet Row Pre-Admission Testing from 10/9/2024 in [facility]   Do you snore loudly? 0 filed at 10/09/2024 1437   Do you often feel tired or fatigued after your sleep? 0 filed at 10/09/2024 1437   Has anyone ever observed you stop breathing in your sleep? 0 filed at 10/09/2024 1437   Do you have or are you being treated for high blood pressure? 0 filed at 10/09/2024 1437   Recent BMI (Calculated) 34.3 filed at 10/09/2024 1437   Is BMI greater than 35 kg/m2? 0=No filed at 10/09/2024 1437   Age older than 50 years old? 1=Yes filed at 10/09/2024 1437   Is your neck circumference greater than 17 inches (Male) or 16 inches (Female)? 0 filed at 10/09/2024 1437   Gender - Male 0=No filed at 10/09/2024 1437   STOP-BANG Total Score 1 filed at 10/09/2024 1437          No  results found for this or any previous visit (from the past 504 hour(s)).     Diagnostic Results    -EKG 10/9/24  Normal sinus rhythm  Right bundle branch block  Left anterior fascicular block  **Bifascicular block**   Abnormal ECG  When compared with ECG of 2021 18:39,    -CHARLENE 10/9/24  CONCLUSIONS:   1. Left ventricular ejection fraction is normal, calculated by Naidu's biplane at 61%.   2. Spectral Doppler shows an impaired relaxation pattern of left ventricular diastolic filling.   3. There is normal right ventricular global systolic function.   4. Mildly elevated right ventricular systolic pressure.    - EK23 at [facility], see media  Sinus rhythm   Left anterior fascicular block   Low voltage, precordial leads   Abnormal R-wave progression, early transition   Probable left ventricular hypertrophy   Anterior Q waves, possibly due to LVH   Abnormal ECG     - CT Calcium scoring: 10/07/20  IMPRESSION:  1. Coronary artery calcium score of 369*.  2. HOWARD 79th percentile** for age, gender, and race in asymptomatic  patients.     - CT Chest: 24  IMPRESSION:  1. Further increase in the size of the spiculated right upper lobe  mass measuring currently 4.8 x 3.3 cm in comparison to 3.2 x 3.2 cm with surrounding right upper lobe interstitial septal thickening and scattered micro nodules. Finding would still be concerning for neoplastic process despite the previous negative biopsy findings given continued growth.  2. Stable changes of left upper lobectomy.  3. Prominent right lower paratracheal lymph node measuring 1 cm which showed FDG uptake in prior PET scan dated 2020.  4. Minimal hepatic contour irregularity. This finding is nonspecific, however advise correlation with liver function tests.  5. Uncomplicated cholelithiasis.  6. Partially visualized left cortical renal hypoattenuating lesion  measuring 2.9 cm likely cyst.     - PET CT: 24  IMPRESSION:  1. Previously described spiculated  nodule in the right upper lobe  demonstrates intense hypermetabolic activity and is most compatible with malignancy. Intensely hypermetabolic station 4R lymph nodes are most consistent with metastatic disease.  2. Postsurgical changes of left upper lobectomy without evidence of abnormal hypermetabolic activity along the resection site to indicate recurrent disease.  3. No evidence of FDG avid distant metastatic disease.     - PFTs: 08/15/24  The FEV1/FVC is reduced. The FEV1 is normal. The FVC is normal. The DLCO is normal; however, the diffusing capacity was not corrected for the patient's hemoglobin. The airways resistance is normal. Conclusion: Reduced FEV1/FVC with a normal FVC indicates a possible airflow obstructive defect. The DLCO is normal. Mild COPD.     VASC US Lower Extremity Venous Duplex Right 9/21/24  FINDINGS: Evaluation of the right lower extremity from the thigh to the knee shows occlusive thrombus in the right femoral vein and popliteal vein and probably in the right calf veins. Thrombotic material in the proximal right femoral vein appears   mobile.     US Carotid Artery Bilateral 9/22/2020  Impression  MINIMAL ATHEROSCLEROSIS AND INTIMAL THICKENING INVOLVING BOTH ICAS.     BILATERAL ICA LESS THAN 50% STENOSIS.     BILATERAL ANTEGRADE VERTEBRAL FLOW.     CHARLENE 9/22/2020  Impression:    1. Normal LV systolic function.    2. LVEF 65%.    3. LV hypertrophy with diastolic dysfunction.    4. MARYANNE    5. RVE with RV Global Hypokinesis and RV Pressure Volume overload.    6. Moderate PHTN, RVSP 45.    7. Moderate Tricuspid Regurgitation.    8. No significant valvular heart disease otherwise.    6. Normal pericardium.     Assessment and Plan:     Anesthesia:  The patient denies problems with anesthesia in the past such as PONV, prolonged sedation, awareness, dental damage, aspiration, cardiac arrest, difficult intubation, or unexpected hospital admissions.     Cardiovascular  #hypertension-previously on  metoprolol. Not currently requiring medication  #hyperlipidemia managed on simvastatin-continue as prescribed in the perioperative period.    #Elevated Calcium CT score-per patient no follow up testing. She is noted to have severe coronary artery calcifications noted on CT scan from 6/2024. Referral placed for cardiology and preop risk stratification prior to surgery.   #Moderate TR -by echo 9/2020, planned for repeat echo today at 330  She is scheduled for non-cardiac surgery associated with elevated risk. The patient has no major cardiac contraindications to non- cardiac surgery.  Cardiology Evaluation  The patient is not followed by cardiology.  METS  The patient's functional capacity capacity is greater than 4 METS.  RCRI  The patient meets 2 RCRI criteria and therefore has a 10.1% risk (elevated) of major adverse cardiac complications.  NORTH score which indicates a 0.1% risk of intraoperative or 30-day postoperative MACE (major adverse cardiac event).    Pulmonary   #COPD-mild per recent PFTs 8/15/24. Not requiring inhalers. Denies any recent URIs, exacerbations or hospitalizations   #GERTRUDE [id number] NSCLC s/p lobectomy by Dr. Martinez on 1/21/21   #RUL lung nodule-concern for malignancy, scheduled for surgery with Dr. Pacheco on 10/23/24  Onc: Herb Kramer 07/29/24   Pulmonology: Molly Arango  CNP, 06/03/24     The patient has a stop bang score of 1, which places patient at low risk for having DANIE.    ARISCAT 56, High, 42.1% risk of in-hospital postoperative pulmonary complications  PRODIGY 16, high risk of respiratory depression episode. Patient given PI sheet for preoperative deep breathing exercises.    Neuro:   The patient has no neurological diagnoses or significant findings on chart review, clinical presentation, and evaluation. No grossly apparent neurological perioperative risk. The patient is at increased risk for postoperative delirium secondary to age 65 or older. The patient is at increased risk  for perioperative stroke secondary to hypertension , increased age, hyperlipidemia, female gender, general anesthesia, operative time >2.5 hours. Handouts for preoperative brain exercises given to patient.    HEENT/Airway  No diagnoses, significant findings on chart review, clinical presentation, or evaluation. No documented or reported history of airway difficulty.     Endocrine  #hypothyroidism managed on Levothyroxine. TSH obtained.    Gastrointestinal  #GERD managed on pantoprazole.  No current GI complaints.    Eat 10- 0,  self-perceived oropharyngeal dysphagia scale (0-40)     Genitourinary  No diagnoses or significant findings on chart review or clinical presentation and evaluation.    Renal  No renal diagnoses or significant findings on chart review or clinical presentation and evaluation. The patient has specific risk factors associated with increased risk of perioperative renal complications related to age greater than 55, hypertension. Preventative measures include preoperative hydration.    Hematology  #RLE DVT and B/L PE -9/2020 in the setting of newly diagnosed GERTRUDE lung cancer. Treated with AC. No recurrence.    Caprini score 13, high risk of perioperative VTE.     Patient instructed to ambulate as soon as possible postoperatively to decrease thromboembolic risk. Initiate mechanical DVT prophylaxis as soon as possible and initiate chemical prophylaxis when deemed safe from a bleeding standpoint post surgery.     Transfusion Evaluation  A type and screen was obtained given the likelihood for perioperative transfusion of blood or blood products.    Musculoskeletal  #OA-prior knee replacement left and right. Uses cane to ambulate. No current complaints. Avoid NSAIDs 7 days prior to surgery.     ID  No diagnoses or significant findings on chart review or clinical presentation and evaluation. MRSA screening obtained. Prescriptions and instructions given for Hibiclens and Peridex.    -Preoperative medication  instructions were provided and reviewed with the patient.  Any additional testing or evaluation was explained to the patient.  NPO Instructions were discussed, and the patient's questions were answered prior to conclusion of this encounter. Patient verbalized understanding of preoperative instructions. After Visit Summary given.      10/14/24-Patient with positive antibodies. Plan  for repeat T&S within 72 hours of surgery. Order placed.    10/18-Patient seen by cardiology Dr. Sagar Sol. Plan to undergo cardiac stress in the setting of abnormal EKG and severe coronary artery calcifications. Patient scheduled for today at 12pm. Dr. Pacheco emailed regarding update.    10/20  MYOCARDIAL PERFUSION STRESS TEST WITH LEXISCAN   IMPRESSION:  Normal Lexiscan Myoview cardiac perfusion stress test.  No evidence of ischemia or myocardial infarction by perfusion imaging.  Normal left ventricular systolic function, ejection fraction 66%.  Noninvasive risk stratification: Low risk.  No previous available for comparison.

## 2024-10-10 LAB
AORTIC VALVE MEAN GRADIENT: 5.3 MMHG
AORTIC VALVE PEAK VELOCITY: 1.62 M/S
ATRIAL RATE: 61 BPM
AV PEAK GRADIENT: 10.5 MMHG
AVA (PEAK VEL): 2.14 CM2
AVA (VTI): 2.01 CM2
BB ANTIBODY IDENTIFICATION: NORMAL
CASE #: NORMAL
EJECTION FRACTION APICAL 4 CHAMBER: 63.3
EJECTION FRACTION: 61 %
LEFT ATRIUM VOLUME AREA LENGTH INDEX BSA: 21 ML/M2
LEFT VENTRICULAR OUTFLOW TRACT DIAMETER: 1.96 CM
MITRAL VALVE E/A RATIO: 0.83
P AXIS: 43 DEGREES
P OFFSET: 191 MS
P ONSET: 128 MS
PATH REV-IMMUNOHEMATOLOGY-PR30: NORMAL
PR INTERVAL: 176 MS
Q ONSET: 216 MS
QRS COUNT: 10 BEATS
QRS DURATION: 134 MS
QT INTERVAL: 454 MS
QTC CALCULATION(BAZETT): 457 MS
QTC FREDERICIA: 456 MS
R AXIS: -51 DEGREES
RIGHT VENTRICLE FREE WALL PEAK S': 10 CM/S
RIGHT VENTRICLE PEAK SYSTOLIC PRESSURE: 39 MMHG
T AXIS: -9 DEGREES
T OFFSET: 443 MS
TRICUSPID ANNULAR PLANE SYSTOLIC EXCURSION: 2.1 CM
VENTRICULAR RATE: 61 BPM

## 2024-10-11 LAB — STAPHYLOCOCCUS SPEC CULT: NORMAL

## 2024-10-17 ENCOUNTER — APPOINTMENT (OUTPATIENT)
Dept: CARDIOLOGY | Facility: CLINIC | Age: 79
End: 2024-10-17
Payer: MEDICARE

## 2024-10-17 VITALS
BODY MASS INDEX: 33.49 KG/M2 | SYSTOLIC BLOOD PRESSURE: 102 MMHG | WEIGHT: 221 LBS | HEIGHT: 68 IN | HEART RATE: 88 BPM | DIASTOLIC BLOOD PRESSURE: 60 MMHG

## 2024-10-17 DIAGNOSIS — R94.31 ABNORMAL EKG: Primary | ICD-10-CM

## 2024-10-17 DIAGNOSIS — R79.1 ABNORMAL COAGULATION PROFILE: ICD-10-CM

## 2024-10-17 DIAGNOSIS — R91.8 MASS OF UPPER LOBE OF RIGHT LUNG: ICD-10-CM

## 2024-10-17 DIAGNOSIS — Z01.810 ENCOUNTER FOR PREPROCEDURAL CARDIOVASCULAR EXAMINATION: ICD-10-CM

## 2024-10-17 DIAGNOSIS — E03.9 HYPOTHYROIDISM, UNSPECIFIED TYPE: ICD-10-CM

## 2024-10-17 PROCEDURE — 3078F DIAST BP <80 MM HG: CPT | Performed by: INTERNAL MEDICINE

## 2024-10-17 PROCEDURE — 3074F SYST BP LT 130 MM HG: CPT | Performed by: INTERNAL MEDICINE

## 2024-10-17 PROCEDURE — 99204 OFFICE O/P NEW MOD 45 MIN: CPT | Performed by: INTERNAL MEDICINE

## 2024-10-17 PROCEDURE — 1159F MED LIST DOCD IN RCRD: CPT | Performed by: INTERNAL MEDICINE

## 2024-10-17 RX ORDER — AMINOPHYLLINE 25 MG/ML
125 INJECTION, SOLUTION INTRAVENOUS ONCE AS NEEDED
OUTPATIENT
Start: 2024-10-17

## 2024-10-17 RX ORDER — REGADENOSON 0.08 MG/ML
0.4 INJECTION, SOLUTION INTRAVENOUS
Status: CANCELLED | OUTPATIENT
Start: 2024-10-17

## 2024-10-17 NOTE — PROGRESS NOTES
Patient presents for cardiovascular evaluation in the [place] office at the request of Deborah Schneider DO for the following:    Chief Complaint:  abnormal EKG, preop risk assessment     HISTORY OF PRESENT ILLNESS:      Brianne Mallory is a 79 y.o. female with prior cardiac history of HPL.  Other pertinent medical history includes R lung mass, needing clearance for general anesthesia.    Patient reports no symptoms of chest pain or shortness of breath.  She has orthopedic issues that prevent her from exerting, she is using a cane and wheelchair.      No palpitations, syncope, or claudication.  No family history of CAD.  No tobacco smoking.    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 Her smoking use included cigarettes. She started smoking about 5 years ago. She has a 5.5 pack-year smoking history. She has never used smokeless tobacco. She reports that she does not currently use alcohol. She reports that she does not use drugs.     Family History:  family history includes Diabetes in her paternal grandmother; Heart attack in her father; Hypertension in her mother; Lung cancer in her maternal grandmother.     Allergies:  Aspirin, Ibuprofen, Nickel, Sulfa (sulfonamide  "antibiotics), and Penicillins    Outpatient Medications:  Current Outpatient Medications   Medication Instructions    chlorhexidine (Hibiclens) 4 % external liquid Use as directed daily preoperatively    chlorhexidine (Peridex) 0.12 % solution Swish and spit with 15ml of solution the night before and morning of surgery. Do not swallow.    cholecalciferol (Vitamin D-3) 125 MCG (5000 UT) capsule Daily    levothyroxine (Synthroid, Levoxyl) 88 mcg tablet 1 tablet, Daily    pantoprazole (PROTONIX) 40 mg, oral, Daily before breakfast, Do not crush, chew, or split.    sertraline (ZOLOFT) 25 mg, Nightly    simvastatin (Zocor) 40 mg tablet 1 tablet, Nightly       ROS: 12 review of systems negative other than chief complaint    PHYSICAL EXAM    Vitals:    10/17/24 1258   BP: 102/60   BP Location: Left arm   Patient Position: Sitting   Pulse: 88   Weight: 100 kg (221 lb)   Height: 1.727 m (5' 8\")     General: No acute distress, appears comfortable  Cardiac: Regular rate and rhythm with no murmurs rubs or gallops, normal S1-S2  Pulmonary: right lung with mild bronchial breath sounds, with no rales or rhonchi, normal respiratory effort  Gastrointestinal: Soft abdomen with no tenderness or guarding, no rebound  Musculoskeletal: No lower extremity edema, normal  Neurological: Alert and oriented x 4, appropriate, moving all extremities well, normal affect  Psychiatric: Normal affect, mood appropriate to occasion  Skin: No rashes, hives or jaundice  HEENT: Normocephalic, atraumatic.  Pupils equal round and reactive.    Last Labs:    CBC -  Lab Results   Component Value Date    WBC 9.2 10/09/2024    HGB 13.2 10/09/2024    HCT 41.9 10/09/2024    MCV 94 10/09/2024     10/09/2024       CMP -  Lab Results   Component Value Date    CALCIUM 9.9 10/09/2024    PHOS 2.1 (L) 01/24/2021    PROT 7.1 10/09/2024    ALBUMIN 4.2 10/09/2024    AST 14 10/09/2024    ALT 6 (L) 10/09/2024    ALKPHOS 62 10/09/2024    BILITOT 0.3 10/09/2024 " "      LIPID PANEL -   No results found for: \"CHOL\", \"HDL\", \"CHHDL\", \"LDL\", \"VLDL\", \"TRIG\", \"NHDL\"    RENAL FUNCTION PANEL -   Lab Results   Component Value Date    K 4.7 10/09/2024    PHOS 2.1 (L) 01/24/2021       No results found for: \"BNP\", \"HGBA1C\"    Last Cardiology Tests:    EKG:  ECHO:  Ejection Fractions:   EF   Date/Time Value Ref Range Status   10/09/2024 05:01 PM 61 %      Cath:  Stress test:  Cardiac imaging:     Assessment/Plan   Diagnoses and all orders for this visit:  Abnormal EKG  -     Nuclear Stress Test; Future  Mass of upper lobe of right lung  -     Referral to Cardiology  Hypothyroidism, unspecified type  -     Referral to Cardiology  Abnormal coagulation profile  -     Referral to Cardiology  Encounter for preprocedural cardiovascular examination  -     Nuclear Stress Test; Future  Other orders  -     regadenoson (Lexiscan) injection 0.4 mg  -     aminophylline syringe 125 mg      Assessment and plan (narrative):    Brianne Mallory is a 79 y.o. female with no prior cardiac history.  She was sent over for preprocedural risk assessment due to abnormal EKG.  EKG is sinus rhythm with right bundle branch block and left anterior fascicular block.  Patient has no ischemic type symptoms but is unable to achieve 4 METS due to orthopedic issues.  She also has a CT scan recently showing significant coronary artery calcification.  I think she should undergo a nuclear stress test prior to general anesthesia.  This is challenging however given the patient's surgery is planned for next Wednesday.  Will attempt to get a nuclear stress test prior.    Followup: After testing    Sagar Sol MD  "

## 2024-10-18 ENCOUNTER — ANCILLARY PROCEDURE (OUTPATIENT)
Dept: CARDIOLOGY | Facility: HOSPITAL | Age: 79
End: 2024-10-18
Payer: MEDICARE

## 2024-10-18 DIAGNOSIS — R94.31 ABNORMAL EKG: ICD-10-CM

## 2024-10-18 DIAGNOSIS — Z01.810 ENCOUNTER FOR PREPROCEDURAL CARDIOVASCULAR EXAMINATION: ICD-10-CM

## 2024-10-18 PROCEDURE — 93016 CV STRESS TEST SUPVJ ONLY: CPT | Performed by: INTERNAL MEDICINE

## 2024-10-18 PROCEDURE — 78452 HT MUSCLE IMAGE SPECT MULT: CPT

## 2024-10-18 PROCEDURE — 93018 CV STRESS TEST I&R ONLY: CPT | Performed by: INTERNAL MEDICINE

## 2024-10-18 PROCEDURE — 93017 CV STRESS TEST TRACING ONLY: CPT

## 2024-10-18 PROCEDURE — 78452 HT MUSCLE IMAGE SPECT MULT: CPT | Performed by: INTERNAL MEDICINE

## 2024-10-18 RX ORDER — REGADENOSON 0.08 MG/ML
0.4 INJECTION, SOLUTION INTRAVENOUS
Status: COMPLETED | OUTPATIENT
Start: 2024-10-18 | End: 2024-10-18

## 2024-10-21 ENCOUNTER — LAB (OUTPATIENT)
Dept: LAB | Facility: LAB | Age: 79
End: 2024-10-21
Payer: MEDICARE

## 2024-10-21 ENCOUNTER — LAB REQUISITION (OUTPATIENT)
Dept: LAB | Facility: HOSPITAL | Age: 79
DRG: 164 | End: 2024-10-21
Payer: MEDICARE

## 2024-10-21 DIAGNOSIS — R91.1 SOLITARY PULMONARY NODULE: ICD-10-CM

## 2024-10-21 DIAGNOSIS — E03.9 HYPOTHYROIDISM, UNSPECIFIED TYPE: ICD-10-CM

## 2024-10-21 DIAGNOSIS — R79.1 ABNORMAL COAGULATION PROFILE: ICD-10-CM

## 2024-10-21 DIAGNOSIS — R91.1 LUNG NODULE: ICD-10-CM

## 2024-10-21 DIAGNOSIS — R91.8 MASS OF UPPER LOBE OF RIGHT LUNG: ICD-10-CM

## 2024-10-21 DIAGNOSIS — E09.9 DRUG OR CHEMICAL INDUCED DIABETES MELLITUS WITHOUT COMPLICATIONS (MULTI): ICD-10-CM

## 2024-10-21 DIAGNOSIS — R91.8 OTHER NONSPECIFIC ABNORMAL FINDING OF LUNG FIELD: ICD-10-CM

## 2024-10-21 LAB
ABO GROUP (TYPE) IN BLOOD: NORMAL
ANTIBODY SCREEN: NORMAL
RH FACTOR (ANTIGEN D): NORMAL

## 2024-10-21 PROCEDURE — 86850 RBC ANTIBODY SCREEN: CPT

## 2024-10-21 PROCEDURE — 86922 COMPATIBILITY TEST ANTIGLOB: CPT

## 2024-10-21 PROCEDURE — 36415 COLL VENOUS BLD VENIPUNCTURE: CPT

## 2024-10-21 PROCEDURE — 86901 BLOOD TYPING SEROLOGIC RH(D): CPT

## 2024-10-21 PROCEDURE — 86077 PHYS BLOOD BANK SERV XMATCH: CPT | Performed by: NURSE PRACTITIONER

## 2024-10-21 PROCEDURE — 86870 RBC ANTIBODY IDENTIFICATION: CPT

## 2024-10-21 PROCEDURE — 86900 BLOOD TYPING SEROLOGIC ABO: CPT

## 2024-10-22 ENCOUNTER — ANESTHESIA EVENT (OUTPATIENT)
Dept: OPERATING ROOM | Facility: HOSPITAL | Age: 79
End: 2024-10-22
Payer: MEDICARE

## 2024-10-22 LAB
BB ANTIBODY IDENTIFICATION: NORMAL
CASE #: NORMAL

## 2024-10-22 RX ORDER — LANOLIN ALCOHOL/MO/W.PET/CERES
1000 CREAM (GRAM) TOPICAL DAILY
COMMUNITY
End: 2024-10-25 | Stop reason: HOSPADM

## 2024-10-22 NOTE — PROGRESS NOTES
Pharmacy Medication History Review    Brianne Mallory is a 79 y.o. female who is planned to be admitted for Mass of upper lobe of right lung. Pharmacy called the patient prior to their scheduled procedure and reviewed the patient's [id number] medications for accuracy.    Medications ADDED:  Cyancobalamin 1000mcg  Medications CHANGED:  none  Medications REMOVED:   Pantoprazole 40mg    Please review updated prior to admission medication list and comments regarding how patient may be taking medications differently by going to Admission tab --> Admission Orders --> Admit Orders / Review prior to admission medications.     Preferred pharmacy, last doses of medications, and allergies to be confirmed with patient by nursing the day of procedure.     Sources used to complete the med history include spoke with patient's daughter (marcos), surescripts, oarrs, care everywhere    Below are additional concerns with the patient's PTA list.  Patient states they have not needed the pantoprazole    Ban Rush    [facility]  Please reach out via Secure Chat for questions

## 2024-10-23 ENCOUNTER — ANESTHESIA (OUTPATIENT)
Dept: OPERATING ROOM | Facility: HOSPITAL | Age: 79
End: 2024-10-23
Payer: MEDICARE

## 2024-10-23 ENCOUNTER — HOSPITAL ENCOUNTER (INPATIENT)
Facility: HOSPITAL | Age: 79
LOS: 2 days | Discharge: HOME HEALTH | End: 2024-10-25
Attending: STUDENT IN AN ORGANIZED HEALTH CARE EDUCATION/TRAINING PROGRAM | Admitting: STUDENT IN AN ORGANIZED HEALTH CARE EDUCATION/TRAINING PROGRAM
Payer: MEDICARE

## 2024-10-23 ENCOUNTER — APPOINTMENT (OUTPATIENT)
Dept: RADIOLOGY | Facility: HOSPITAL | Age: 79
End: 2024-10-23
Payer: MEDICARE

## 2024-10-23 DIAGNOSIS — R91.8 MASS OF UPPER LOBE OF RIGHT LUNG: Primary | ICD-10-CM

## 2024-10-23 LAB — PATH REV-IMMUNOHEMATOLOGY-PR30: NORMAL

## 2024-10-23 PROCEDURE — 3700000001 HC GENERAL ANESTHESIA TIME - INITIAL BASE CHARGE: Performed by: STUDENT IN AN ORGANIZED HEALTH CARE EDUCATION/TRAINING PROGRAM

## 2024-10-23 PROCEDURE — 2500000004 HC RX 250 GENERAL PHARMACY W/ HCPCS (ALT 636 FOR OP/ED): Mod: JZ | Performed by: PHYSICIAN ASSISTANT

## 2024-10-23 PROCEDURE — 1200000002 HC GENERAL ROOM WITH TELEMETRY DAILY

## 2024-10-23 PROCEDURE — 7100000001 HC RECOVERY ROOM TIME - INITIAL BASE CHARGE: Performed by: STUDENT IN AN ORGANIZED HEALTH CARE EDUCATION/TRAINING PROGRAM

## 2024-10-23 PROCEDURE — 81458 SO GSAP DNA CPY NMBR&MCRSTL: CPT | Performed by: STUDENT IN AN ORGANIZED HEALTH CARE EDUCATION/TRAINING PROGRAM

## 2024-10-23 PROCEDURE — 99221 1ST HOSP IP/OBS SF/LOW 40: CPT | Performed by: PHYSICIAN ASSISTANT

## 2024-10-23 PROCEDURE — 2720000007 HC OR 272 NO HCPCS: Performed by: STUDENT IN AN ORGANIZED HEALTH CARE EDUCATION/TRAINING PROGRAM

## 2024-10-23 PROCEDURE — 3600000017 HC OR TIME - EACH INCREMENTAL 1 MINUTE - PROCEDURE LEVEL SIX: Performed by: STUDENT IN AN ORGANIZED HEALTH CARE EDUCATION/TRAINING PROGRAM

## 2024-10-23 PROCEDURE — 3700000002 HC GENERAL ANESTHESIA TIME - EACH INCREMENTAL 1 MINUTE: Performed by: STUDENT IN AN ORGANIZED HEALTH CARE EDUCATION/TRAINING PROGRAM

## 2024-10-23 PROCEDURE — 2500000004 HC RX 250 GENERAL PHARMACY W/ HCPCS (ALT 636 FOR OP/ED): Performed by: STUDENT IN AN ORGANIZED HEALTH CARE EDUCATION/TRAINING PROGRAM

## 2024-10-23 PROCEDURE — 7100000002 HC RECOVERY ROOM TIME - EACH INCREMENTAL 1 MINUTE: Performed by: STUDENT IN AN ORGANIZED HEALTH CARE EDUCATION/TRAINING PROGRAM

## 2024-10-23 PROCEDURE — 2500000001 HC RX 250 WO HCPCS SELF ADMINISTERED DRUGS (ALT 637 FOR MEDICARE OP): Performed by: PHYSICIAN ASSISTANT

## 2024-10-23 PROCEDURE — G0452 MOLECULAR PATHOLOGY INTERPR: HCPCS | Performed by: STUDENT IN AN ORGANIZED HEALTH CARE EDUCATION/TRAINING PROGRAM

## 2024-10-23 PROCEDURE — 88312 SPECIAL STAINS GROUP 1: CPT | Performed by: STUDENT IN AN ORGANIZED HEALTH CARE EDUCATION/TRAINING PROGRAM

## 2024-10-23 PROCEDURE — 88309 TISSUE EXAM BY PATHOLOGIST: CPT | Performed by: STUDENT IN AN ORGANIZED HEALTH CARE EDUCATION/TRAINING PROGRAM

## 2024-10-23 PROCEDURE — 8E0W4CZ ROBOTIC ASSISTED PROCEDURE OF TRUNK REGION, PERCUTANEOUS ENDOSCOPIC APPROACH: ICD-10-PCS | Performed by: STUDENT IN AN ORGANIZED HEALTH CARE EDUCATION/TRAINING PROGRAM

## 2024-10-23 PROCEDURE — 3600000018 HC OR TIME - INITIAL BASE CHARGE - PROCEDURE LEVEL SIX: Performed by: STUDENT IN AN ORGANIZED HEALTH CARE EDUCATION/TRAINING PROGRAM

## 2024-10-23 PROCEDURE — 32663 THORACOSCOPY W/LOBECTOMY: CPT | Performed by: THORACIC SURGERY (CARDIOTHORACIC VASCULAR SURGERY)

## 2024-10-23 PROCEDURE — 88381 MICRODISSECTION MANUAL: CPT | Performed by: STUDENT IN AN ORGANIZED HEALTH CARE EDUCATION/TRAINING PROGRAM

## 2024-10-23 PROCEDURE — 2500000005 HC RX 250 GENERAL PHARMACY W/O HCPCS: Performed by: STUDENT IN AN ORGANIZED HEALTH CARE EDUCATION/TRAINING PROGRAM

## 2024-10-23 PROCEDURE — 32674 THORACOSCOPY LYMPH NODE EXC: CPT | Performed by: THORACIC SURGERY (CARDIOTHORACIC VASCULAR SURGERY)

## 2024-10-23 PROCEDURE — 88305 TISSUE EXAM BY PATHOLOGIST: CPT | Performed by: STUDENT IN AN ORGANIZED HEALTH CARE EDUCATION/TRAINING PROGRAM

## 2024-10-23 PROCEDURE — 2500000004 HC RX 250 GENERAL PHARMACY W/ HCPCS (ALT 636 FOR OP/ED): Performed by: ANESTHESIOLOGY

## 2024-10-23 PROCEDURE — 88313 SPECIAL STAINS GROUP 2: CPT | Performed by: STUDENT IN AN ORGANIZED HEALTH CARE EDUCATION/TRAINING PROGRAM

## 2024-10-23 PROCEDURE — P9045 ALBUMIN (HUMAN), 5%, 250 ML: HCPCS | Mod: JZ | Performed by: STUDENT IN AN ORGANIZED HEALTH CARE EDUCATION/TRAINING PROGRAM

## 2024-10-23 PROCEDURE — 0BTC4ZZ RESECTION OF RIGHT UPPER LUNG LOBE, PERCUTANEOUS ENDOSCOPIC APPROACH: ICD-10-PCS | Performed by: STUDENT IN AN ORGANIZED HEALTH CARE EDUCATION/TRAINING PROGRAM

## 2024-10-23 PROCEDURE — 32674 THORACOSCOPY LYMPH NODE EXC: CPT | Performed by: STUDENT IN AN ORGANIZED HEALTH CARE EDUCATION/TRAINING PROGRAM

## 2024-10-23 PROCEDURE — 88331 PATH CONSLTJ SURG 1 BLK 1SPC: CPT | Performed by: STUDENT IN AN ORGANIZED HEALTH CARE EDUCATION/TRAINING PROGRAM

## 2024-10-23 PROCEDURE — 71045 X-RAY EXAM CHEST 1 VIEW: CPT

## 2024-10-23 PROCEDURE — 88305 TISSUE EXAM BY PATHOLOGIST: CPT | Mod: TC,SUR | Performed by: STUDENT IN AN ORGANIZED HEALTH CARE EDUCATION/TRAINING PROGRAM

## 2024-10-23 PROCEDURE — 0BJ08ZZ INSPECTION OF TRACHEOBRONCHIAL TREE, VIA NATURAL OR ARTIFICIAL OPENING ENDOSCOPIC: ICD-10-PCS | Performed by: STUDENT IN AN ORGANIZED HEALTH CARE EDUCATION/TRAINING PROGRAM

## 2024-10-23 PROCEDURE — 32663 THORACOSCOPY W/LOBECTOMY: CPT | Performed by: STUDENT IN AN ORGANIZED HEALTH CARE EDUCATION/TRAINING PROGRAM

## 2024-10-23 PROCEDURE — 88332 PATH CONSLTJ SURG EA ADD BLK: CPT | Mod: TC,SUR | Performed by: STUDENT IN AN ORGANIZED HEALTH CARE EDUCATION/TRAINING PROGRAM

## 2024-10-23 PROCEDURE — 07T74ZZ RESECTION OF THORAX LYMPHATIC, PERCUTANEOUS ENDOSCOPIC APPROACH: ICD-10-PCS | Performed by: STUDENT IN AN ORGANIZED HEALTH CARE EDUCATION/TRAINING PROGRAM

## 2024-10-23 PROCEDURE — 2500000002 HC RX 250 W HCPCS SELF ADMINISTERED DRUGS (ALT 637 FOR MEDICARE OP, ALT 636 FOR OP/ED): Performed by: PHYSICIAN ASSISTANT

## 2024-10-23 PROCEDURE — 2780000003 HC OR 278 NO HCPCS: Performed by: STUDENT IN AN ORGANIZED HEALTH CARE EDUCATION/TRAINING PROGRAM

## 2024-10-23 RX ORDER — LIDOCAINE HCL/PF 100 MG/5ML
SYRINGE (ML) INTRAVENOUS
Status: DISCONTINUED
Start: 2024-10-23 | End: 2024-10-25 | Stop reason: HOSPADM

## 2024-10-23 RX ORDER — ACETAMINOPHEN 325 MG/1
650 TABLET ORAL EVERY 4 HOURS PRN
Status: DISCONTINUED | OUTPATIENT
Start: 2024-10-23 | End: 2024-10-25

## 2024-10-23 RX ORDER — PHENYLEPHRINE HCL IN 0.9% NACL 0.4MG/10ML
SYRINGE (ML) INTRAVENOUS AS NEEDED
Status: DISCONTINUED | OUTPATIENT
Start: 2024-10-23 | End: 2024-10-23

## 2024-10-23 RX ORDER — HEPARIN SODIUM 5000 [USP'U]/ML
5000 INJECTION, SOLUTION INTRAVENOUS; SUBCUTANEOUS ONCE
Status: COMPLETED | OUTPATIENT
Start: 2024-10-23 | End: 2024-10-23

## 2024-10-23 RX ORDER — BUPIVACAINE HCL/EPINEPHRINE 0.25-.0005
VIAL (ML) INJECTION AS NEEDED
Status: DISCONTINUED | OUTPATIENT
Start: 2024-10-23 | End: 2024-10-23 | Stop reason: HOSPADM

## 2024-10-23 RX ORDER — ROCURONIUM BROMIDE 10 MG/ML
INJECTION, SOLUTION INTRAVENOUS
Status: COMPLETED
Start: 2024-10-23 | End: 2024-10-23

## 2024-10-23 RX ORDER — SODIUM CHLORIDE, SODIUM LACTATE, POTASSIUM CHLORIDE, CALCIUM CHLORIDE 600; 310; 30; 20 MG/100ML; MG/100ML; MG/100ML; MG/100ML
100 INJECTION, SOLUTION INTRAVENOUS CONTINUOUS
Status: DISCONTINUED | OUTPATIENT
Start: 2024-10-23 | End: 2024-10-24

## 2024-10-23 RX ORDER — LEVOTHYROXINE SODIUM 88 UG/1
88 TABLET ORAL DAILY
Status: DISCONTINUED | OUTPATIENT
Start: 2024-10-24 | End: 2024-10-25 | Stop reason: HOSPADM

## 2024-10-23 RX ORDER — CEFAZOLIN SODIUM 2 G/100ML
2 INJECTION, SOLUTION INTRAVENOUS EVERY 8 HOURS
Status: COMPLETED | OUTPATIENT
Start: 2024-10-23 | End: 2024-10-24

## 2024-10-23 RX ORDER — PANTOPRAZOLE SODIUM 40 MG/1
40 TABLET, DELAYED RELEASE ORAL
Status: CANCELLED | OUTPATIENT
Start: 2024-10-24

## 2024-10-23 RX ORDER — SODIUM CHLORIDE, SODIUM LACTATE, POTASSIUM CHLORIDE, CALCIUM CHLORIDE 600; 310; 30; 20 MG/100ML; MG/100ML; MG/100ML; MG/100ML
INJECTION, SOLUTION INTRAVENOUS
Status: DISCONTINUED
Start: 2024-10-23 | End: 2024-10-25 | Stop reason: HOSPADM

## 2024-10-23 RX ORDER — ONDANSETRON HYDROCHLORIDE 2 MG/ML
4 INJECTION, SOLUTION INTRAVENOUS ONCE AS NEEDED
Status: DISCONTINUED | OUTPATIENT
Start: 2024-10-23 | End: 2024-10-25 | Stop reason: HOSPADM

## 2024-10-23 RX ORDER — NALOXONE HYDROCHLORIDE 0.4 MG/ML
0.2 INJECTION, SOLUTION INTRAMUSCULAR; INTRAVENOUS; SUBCUTANEOUS AS NEEDED
Status: DISCONTINUED | OUTPATIENT
Start: 2024-10-23 | End: 2024-10-25 | Stop reason: HOSPADM

## 2024-10-23 RX ORDER — ONDANSETRON HYDROCHLORIDE 2 MG/ML
INJECTION, SOLUTION INTRAVENOUS
Status: COMPLETED
Start: 2024-10-23 | End: 2024-10-23

## 2024-10-23 RX ORDER — ONDANSETRON HYDROCHLORIDE 2 MG/ML
4 INJECTION, SOLUTION INTRAVENOUS ONCE AS NEEDED
Status: DISCONTINUED | OUTPATIENT
Start: 2024-10-23 | End: 2024-10-23 | Stop reason: HOSPADM

## 2024-10-23 RX ORDER — FENTANYL CITRATE 50 UG/ML
INJECTION, SOLUTION INTRAMUSCULAR; INTRAVENOUS CONTINUOUS PRN
Status: DISCONTINUED | OUTPATIENT
Start: 2024-10-23 | End: 2024-10-23

## 2024-10-23 RX ORDER — AMOXICILLIN 250 MG
2 CAPSULE ORAL 2 TIMES DAILY
Status: DISCONTINUED | OUTPATIENT
Start: 2024-10-23 | End: 2024-10-25 | Stop reason: HOSPADM

## 2024-10-23 RX ORDER — ALBUMIN HUMAN 50 G/1000ML
SOLUTION INTRAVENOUS
Status: COMPLETED
Start: 2024-10-23 | End: 2024-10-23

## 2024-10-23 RX ORDER — LIDOCAINE HYDROCHLORIDE 10 MG/ML
0.1 INJECTION, SOLUTION INFILTRATION; PERINEURAL ONCE
Status: DISCONTINUED | OUTPATIENT
Start: 2024-10-23 | End: 2024-10-25 | Stop reason: HOSPADM

## 2024-10-23 RX ORDER — ESMOLOL HYDROCHLORIDE 10 MG/ML
INJECTION INTRAVENOUS
Status: DISCONTINUED
Start: 2024-10-23 | End: 2024-10-25 | Stop reason: HOSPADM

## 2024-10-23 RX ORDER — PHENYLEPHRINE HCL IN 0.9% NACL 0.4MG/10ML
SYRINGE (ML) INTRAVENOUS
Status: COMPLETED
Start: 2024-10-23 | End: 2024-10-23

## 2024-10-23 RX ORDER — ACETAMINOPHEN 10 MG/ML
1000 INJECTION, SOLUTION INTRAVENOUS ONCE
Status: COMPLETED | OUTPATIENT
Start: 2024-10-23 | End: 2024-10-23

## 2024-10-23 RX ORDER — HEPARIN SODIUM 5000 [USP'U]/ML
5000 INJECTION, SOLUTION INTRAVENOUS; SUBCUTANEOUS EVERY 8 HOURS
Status: DISCONTINUED | OUTPATIENT
Start: 2024-10-24 | End: 2024-10-25 | Stop reason: HOSPADM

## 2024-10-23 RX ORDER — CEFAZOLIN SODIUM 2 G/100ML
2 INJECTION, SOLUTION INTRAVENOUS ONCE
Status: DISCONTINUED | OUTPATIENT
Start: 2024-10-23 | End: 2024-10-23 | Stop reason: HOSPADM

## 2024-10-23 RX ORDER — HYDROMORPHONE HYDROCHLORIDE 1 MG/ML
0.1 INJECTION, SOLUTION INTRAMUSCULAR; INTRAVENOUS; SUBCUTANEOUS EVERY 5 MIN PRN
Status: DISCONTINUED | OUTPATIENT
Start: 2024-10-23 | End: 2024-10-23 | Stop reason: HOSPADM

## 2024-10-23 RX ORDER — GLYCOPYRROLATE 0.2 MG/ML
INJECTION INTRAMUSCULAR; INTRAVENOUS AS NEEDED
Status: DISCONTINUED | OUTPATIENT
Start: 2024-10-23 | End: 2024-10-23

## 2024-10-23 RX ORDER — DEXMEDETOMIDINE HYDROCHLORIDE 4 UG/ML
.1-1.5 INJECTION, SOLUTION INTRAVENOUS CONTINUOUS
Status: DISCONTINUED | OUTPATIENT
Start: 2024-10-23 | End: 2024-10-25

## 2024-10-23 RX ORDER — FENTANYL CITRATE 50 UG/ML
INJECTION, SOLUTION INTRAMUSCULAR; INTRAVENOUS
Status: DISCONTINUED
Start: 2024-10-23 | End: 2024-10-25 | Stop reason: HOSPADM

## 2024-10-23 RX ORDER — ONDANSETRON HYDROCHLORIDE 2 MG/ML
INJECTION, SOLUTION INTRAVENOUS AS NEEDED
Status: DISCONTINUED | OUTPATIENT
Start: 2024-10-23 | End: 2024-10-23

## 2024-10-23 RX ORDER — HYDROMORPHONE HYDROCHLORIDE 1 MG/ML
0.5 INJECTION, SOLUTION INTRAMUSCULAR; INTRAVENOUS; SUBCUTANEOUS EVERY 5 MIN PRN
Status: DISCONTINUED | OUTPATIENT
Start: 2024-10-23 | End: 2024-10-23 | Stop reason: HOSPADM

## 2024-10-23 RX ORDER — ONDANSETRON HYDROCHLORIDE 2 MG/ML
4 INJECTION, SOLUTION INTRAVENOUS EVERY 8 HOURS PRN
Status: DISCONTINUED | OUTPATIENT
Start: 2024-10-23 | End: 2024-10-25 | Stop reason: HOSPADM

## 2024-10-23 RX ORDER — GLYCOPYRROLATE 0.2 MG/ML
INJECTION INTRAMUSCULAR; INTRAVENOUS
Status: DISCONTINUED
Start: 2024-10-23 | End: 2024-10-25 | Stop reason: HOSPADM

## 2024-10-23 RX ORDER — HYDROMORPHONE HYDROCHLORIDE 1 MG/ML
0.2 INJECTION, SOLUTION INTRAMUSCULAR; INTRAVENOUS; SUBCUTANEOUS EVERY 5 MIN PRN
Status: DISCONTINUED | OUTPATIENT
Start: 2024-10-23 | End: 2024-10-23 | Stop reason: HOSPADM

## 2024-10-23 RX ORDER — CEFAZOLIN 1 G/1
INJECTION, POWDER, FOR SOLUTION INTRAVENOUS AS NEEDED
Status: DISCONTINUED | OUTPATIENT
Start: 2024-10-23 | End: 2024-10-23

## 2024-10-23 RX ORDER — PROPOFOL 10 MG/ML
INJECTION, EMULSION INTRAVENOUS
Status: COMPLETED
Start: 2024-10-23 | End: 2024-10-23

## 2024-10-23 RX ORDER — CEFAZOLIN 1 G/1
INJECTION, POWDER, FOR SOLUTION INTRAVENOUS
Status: DISCONTINUED
Start: 2024-10-23 | End: 2024-10-25 | Stop reason: HOSPADM

## 2024-10-23 RX ORDER — SODIUM CHLORIDE 0.9 G/100ML
IRRIGANT IRRIGATION AS NEEDED
Status: DISCONTINUED | OUTPATIENT
Start: 2024-10-23 | End: 2024-10-23 | Stop reason: HOSPADM

## 2024-10-23 RX ORDER — PROPOFOL 10 MG/ML
INJECTION, EMULSION INTRAVENOUS AS NEEDED
Status: DISCONTINUED | OUTPATIENT
Start: 2024-10-23 | End: 2024-10-23

## 2024-10-23 RX ORDER — SODIUM CHLORIDE, SODIUM LACTATE, POTASSIUM CHLORIDE, CALCIUM CHLORIDE 600; 310; 30; 20 MG/100ML; MG/100ML; MG/100ML; MG/100ML
20 INJECTION, SOLUTION INTRAVENOUS CONTINUOUS
Status: DISCONTINUED | OUTPATIENT
Start: 2024-10-23 | End: 2024-10-23

## 2024-10-23 RX ORDER — HYDROMORPHONE HCL/0.9% NACL/PF 15 MG/30ML
PATIENT CONTROLLED ANALGESIA SYRINGE INTRAVENOUS CONTINUOUS
Status: DISCONTINUED | OUTPATIENT
Start: 2024-10-23 | End: 2024-10-24

## 2024-10-23 RX ORDER — METOPROLOL TARTRATE 25 MG/1
12.5 TABLET, FILM COATED ORAL EVERY 8 HOURS SCHEDULED
Status: DISCONTINUED | OUTPATIENT
Start: 2024-10-23 | End: 2024-10-25

## 2024-10-23 RX ORDER — ALBUMIN HUMAN 50 G/1000ML
SOLUTION INTRAVENOUS AS NEEDED
Status: DISCONTINUED | OUTPATIENT
Start: 2024-10-23 | End: 2024-10-23

## 2024-10-23 RX ORDER — SERTRALINE HYDROCHLORIDE 25 MG/1
25 TABLET, FILM COATED ORAL DAILY
Status: DISCONTINUED | OUTPATIENT
Start: 2024-10-23 | End: 2024-10-25 | Stop reason: HOSPADM

## 2024-10-23 RX ORDER — SODIUM CHLORIDE, SODIUM LACTATE, POTASSIUM CHLORIDE, CALCIUM CHLORIDE 600; 310; 30; 20 MG/100ML; MG/100ML; MG/100ML; MG/100ML
100 INJECTION, SOLUTION INTRAVENOUS CONTINUOUS
Status: DISCONTINUED | OUTPATIENT
Start: 2024-10-23 | End: 2024-10-23 | Stop reason: HOSPADM

## 2024-10-23 RX ORDER — DROPERIDOL 2.5 MG/ML
0.62 INJECTION, SOLUTION INTRAMUSCULAR; INTRAVENOUS ONCE AS NEEDED
Status: DISCONTINUED | OUTPATIENT
Start: 2024-10-23 | End: 2024-10-23 | Stop reason: HOSPADM

## 2024-10-23 RX ORDER — LIDOCAINE HYDROCHLORIDE 10 MG/ML
0.1 INJECTION, SOLUTION INFILTRATION; PERINEURAL ONCE
Status: DISCONTINUED | OUTPATIENT
Start: 2024-10-23 | End: 2024-10-23 | Stop reason: HOSPADM

## 2024-10-23 RX ORDER — IPRATROPIUM BROMIDE AND ALBUTEROL SULFATE 2.5; .5 MG/3ML; MG/3ML
3 SOLUTION RESPIRATORY (INHALATION) EVERY 6 HOURS PRN
Status: DISCONTINUED | OUTPATIENT
Start: 2024-10-23 | End: 2024-10-25 | Stop reason: HOSPADM

## 2024-10-23 RX ORDER — SIMVASTATIN 40 MG/1
40 TABLET, FILM COATED ORAL NIGHTLY
Status: DISCONTINUED | OUTPATIENT
Start: 2024-10-23 | End: 2024-10-25 | Stop reason: HOSPADM

## 2024-10-23 RX ORDER — PHENYLEPHRINE 10 MG/250 ML(40 MCG/ML)IN 0.9 % SOD.CHLORIDE INTRAVENOUS
CONTINUOUS PRN
Status: DISCONTINUED | OUTPATIENT
Start: 2024-10-23 | End: 2024-10-23

## 2024-10-23 RX ORDER — ROCURONIUM BROMIDE 10 MG/ML
INJECTION, SOLUTION INTRAVENOUS
Status: DISCONTINUED
Start: 2024-10-23 | End: 2024-10-25 | Stop reason: HOSPADM

## 2024-10-23 RX ORDER — LIDOCAINE HYDROCHLORIDE 20 MG/ML
INJECTION, SOLUTION INFILTRATION; PERINEURAL AS NEEDED
Status: DISCONTINUED | OUTPATIENT
Start: 2024-10-23 | End: 2024-10-23

## 2024-10-23 RX ORDER — ROCURONIUM BROMIDE 10 MG/ML
INJECTION, SOLUTION INTRAVENOUS AS NEEDED
Status: DISCONTINUED | OUTPATIENT
Start: 2024-10-23 | End: 2024-10-23

## 2024-10-23 SDOH — SOCIAL STABILITY: SOCIAL INSECURITY: ABUSE: ADULT

## 2024-10-23 SDOH — SOCIAL STABILITY: SOCIAL INSECURITY: DO YOU FEEL UNSAFE GOING BACK TO THE PLACE WHERE YOU ARE LIVING?: NO

## 2024-10-23 SDOH — SOCIAL STABILITY: SOCIAL INSECURITY: ARE YOU OR HAVE YOU BEEN THREATENED OR ABUSED PHYSICALLY, EMOTIONALLY, OR SEXUALLY BY ANYONE?: NO

## 2024-10-23 SDOH — SOCIAL STABILITY: SOCIAL INSECURITY: DOES ANYONE TRY TO KEEP YOU FROM HAVING/CONTACTING OTHER FRIENDS OR DOING THINGS OUTSIDE YOUR HOME?: NO

## 2024-10-23 SDOH — SOCIAL STABILITY: SOCIAL INSECURITY: HAVE YOU HAD ANY THOUGHTS OF HARMING ANYONE ELSE?: NO

## 2024-10-23 SDOH — SOCIAL STABILITY: SOCIAL INSECURITY: DO YOU FEEL ANYONE HAS EXPLOITED OR TAKEN ADVANTAGE OF YOU FINANCIALLY OR OF YOUR PERSONAL PROPERTY?: NO

## 2024-10-23 SDOH — SOCIAL STABILITY: SOCIAL INSECURITY: ARE THERE ANY APPARENT SIGNS OF INJURIES/BEHAVIORS THAT COULD BE RELATED TO ABUSE/NEGLECT?: NO

## 2024-10-23 SDOH — SOCIAL STABILITY: SOCIAL INSECURITY: HAVE YOU HAD THOUGHTS OF HARMING ANYONE ELSE?: NO

## 2024-10-23 SDOH — SOCIAL STABILITY: SOCIAL INSECURITY: WERE YOU ABLE TO COMPLETE ALL THE BEHAVIORAL HEALTH SCREENINGS?: YES

## 2024-10-23 SDOH — SOCIAL STABILITY: SOCIAL INSECURITY: HAS ANYONE EVER THREATENED TO HURT YOUR FAMILY OR YOUR PETS?: NO

## 2024-10-23 ASSESSMENT — COGNITIVE AND FUNCTIONAL STATUS - GENERAL
TURNING FROM BACK TO SIDE WHILE IN FLAT BAD: A LITTLE
WALKING IN HOSPITAL ROOM: A LITTLE
DRESSING REGULAR LOWER BODY CLOTHING: A LITTLE
PERSONAL GROOMING: A LITTLE
STANDING UP FROM CHAIR USING ARMS: A LITTLE
MOVING TO AND FROM BED TO CHAIR: A LITTLE
CLIMB 3 TO 5 STEPS WITH RAILING: A LITTLE
MOVING FROM LYING ON BACK TO SITTING ON SIDE OF FLAT BED WITH BEDRAILS: A LITTLE
DAILY ACTIVITIY SCORE: 19
MOBILITY SCORE: 18
TOILETING: A LITTLE
DRESSING REGULAR UPPER BODY CLOTHING: A LITTLE
HELP NEEDED FOR BATHING: A LITTLE
PATIENT BASELINE BEDBOUND: NO

## 2024-10-23 ASSESSMENT — PAIN SCALES - GENERAL
PAINLEVEL_OUTOF10: 5 - MODERATE PAIN
PAINLEVEL_OUTOF10: 6
PAINLEVEL_OUTOF10: 5 - MODERATE PAIN
PAINLEVEL_OUTOF10: 0 - NO PAIN
PAINLEVEL_OUTOF10: 7
PAIN_LEVEL: 2
PAINLEVEL_OUTOF10: 7

## 2024-10-23 ASSESSMENT — PAIN - FUNCTIONAL ASSESSMENT
PAIN_FUNCTIONAL_ASSESSMENT: 0-10
PAIN_FUNCTIONAL_ASSESSMENT: UNABLE TO SELF-REPORT
PAIN_FUNCTIONAL_ASSESSMENT: 0-10
PAIN_FUNCTIONAL_ASSESSMENT: UNABLE TO SELF-REPORT
PAIN_FUNCTIONAL_ASSESSMENT: UNABLE TO SELF-REPORT

## 2024-10-23 ASSESSMENT — COLUMBIA-SUICIDE SEVERITY RATING SCALE - C-SSRS
1. IN THE PAST MONTH, HAVE YOU WISHED YOU WERE DEAD OR WISHED YOU COULD GO TO SLEEP AND NOT WAKE UP?: NO
2. HAVE YOU ACTUALLY HAD ANY THOUGHTS OF KILLING YOURSELF?: NO
6. HAVE YOU EVER DONE ANYTHING, STARTED TO DO ANYTHING, OR PREPARED TO DO ANYTHING TO END YOUR LIFE?: NO

## 2024-10-23 ASSESSMENT — LIFESTYLE VARIABLES
AUDIT-C TOTAL SCORE: 0
SUBSTANCE_ABUSE_PAST_12_MONTHS: NO
AUDIT-C TOTAL SCORE: 0
SKIP TO QUESTIONS 9-10: 1
HOW MANY STANDARD DRINKS CONTAINING ALCOHOL DO YOU HAVE ON A TYPICAL DAY: PATIENT DOES NOT DRINK
HOW OFTEN DO YOU HAVE A DRINK CONTAINING ALCOHOL: NEVER
PRESCIPTION_ABUSE_PAST_12_MONTHS: NO
HOW OFTEN DO YOU HAVE 6 OR MORE DRINKS ON ONE OCCASION: NEVER

## 2024-10-23 ASSESSMENT — ACTIVITIES OF DAILY LIVING (ADL)
JUDGMENT_ADEQUATE_SAFELY_COMPLETE_DAILY_ACTIVITIES: YES
FEEDING YOURSELF: INDEPENDENT
ASSISTIVE_DEVICE: WALKER;CANE
BATHING: INDEPENDENT
HEARING - RIGHT EAR: FUNCTIONAL
WALKS IN HOME: INDEPENDENT
GROOMING: INDEPENDENT
TOILETING: INDEPENDENT
PATIENT'S MEMORY ADEQUATE TO SAFELY COMPLETE DAILY ACTIVITIES?: YES
HEARING - LEFT EAR: FUNCTIONAL
DRESSING YOURSELF: INDEPENDENT
ADEQUATE_TO_COMPLETE_ADL: YES

## 2024-10-23 ASSESSMENT — PATIENT HEALTH QUESTIONNAIRE - PHQ9
1. LITTLE INTEREST OR PLEASURE IN DOING THINGS: NOT AT ALL
2. FEELING DOWN, DEPRESSED OR HOPELESS: NOT AT ALL
SUM OF ALL RESPONSES TO PHQ9 QUESTIONS 1 & 2: 0

## 2024-10-23 ASSESSMENT — PAIN DESCRIPTION - LOCATION: LOCATION: CHEST

## 2024-10-23 ASSESSMENT — PAIN DESCRIPTION - ORIENTATION: ORIENTATION: RIGHT

## 2024-10-23 NOTE — BRIEF OP NOTE
Date: 10/23/2024  OR Location: [facility] OR    Name: Brianne Mallory, : 1945, Age: 79 y.o., MRN: [id number], Sex: female    Diagnosis  Pre-op Diagnosis      * Mass of upper lobe of right lung [R91.8] Post-op Diagnosis     * Mass of upper lobe of right lung [R91.8]     Procedures  Bronchoscopy, Right robotic assisted upper lobectomy, Mediastinal lymph node dissection    Surgeons      * Gissell Pacheco - Primary    Resident/Fellow/Other Assistant:  Surgeons and Role:     * Joshua Zhu MD - Assisting     * Merle Cosby PA-C - [facility] Assist    Procedure Summary  Anesthesia: General  ASA: III  Anesthesia Staff: Anesthesiologist: Richard Waters MD  CRNA: CHEYANNE Stafford-CRNA  Anesthesia Resident: Yair Gomes MD  Estimated Blood Loss: 150mL  Intra-op Medications:   Administrations occurring from 1040 to 1455 on 10/23/24:   Medication Name Total Dose   sodium chloride 0.9 % irrigation solution 1,000 mL   dexmedeTOMIDine (Precedex) 400 mcg in 100 mL (4 mcg/mL) sodium chloride 0.9% infusion 111.86 mcg   lactated Ringer's infusion 737.5 mL   albumin human infusion  - Omnicell Override Pull Cannot be calculated   dexAMETHasone (Decadron) injection  - Omnicell Override Pull Cannot be calculated   phenylephrine HCl in 0.9% NaCl syringe  - Omnicell Override Pull Cannot be calculated   phenylephrine HCl in 0.9% NaCl syringe  - Omnicell Override Pull Cannot be calculated   propofol (Diprivan) injection  - Omnicell Override Pull Cannot be calculated   albumin human 5 % 250 mL   ceFAZolin (Ancef) vial 1 g 2 g   dexAMETHasone (Decadron) injection 4 mg/mL 10 mg   glycopyrrolate (Robinul) injection 0.4 mg   lidocaine (Xylocaine) injection 2 % 100 mg   phenylephrine (Adrián-Synephrine) 10 mg in sodium chloride 0.9% 250 mL (0.04 mg/mL) infusion (premix) 2.21 mg   phenylephrine 40 mcg/mL syringe 10 mL 800 mcg   propofol (Diprivan) injection 10 mg/mL 200 mg   rocuronium (ZeMuron) 50 mg/5 mL injection  100 mg              Anesthesia Record               Intraprocedure I/O Totals          Intake    Dexmedetomidine 0.00 mL    The total shown is the total volume documented since Anesthesia Start was filed.    Phenylephrine Drip 0.00 mL    The total shown is the total volume documented since Anesthesia Start was filed.    acetaminophen (Ofirmev) injection 1,000 mg 100.00 mL    Total Intake 100 mL       Output    Urine 200 mL    Est. Blood Loss 400 mL    Total Output 600 mL       Net    Net Volume -500 mL          Specimen:   ID Type Source Tests Collected by Time   1 : LEVEL 9 LYMPH NODE Tissue LYMPH NODE PULMONARY (SPECIFY SITE) SURGICAL PATHOLOGY EXAM Gissell Pacheco, DO 10/23/2024 1302   2 : LEVEL 8 LYMPH NODE Tissue LYMPH NODE PULMONARY (SPECIFY SITE) SURGICAL PATHOLOGY EXAM Gissell Pacheco, DO 10/23/2024 1302   3 : LEVEL 7 LYMPH NODE Tissue LYMPH NODE PULMONARY (SPECIFY SITE) SURGICAL PATHOLOGY EXAM Gissell Pacheco, DO 10/23/2024 1304   4 : LEVEL 11 LN Tissue LYMPH NODE PULMONARY (SPECIFY SITE) SURGICAL PATHOLOGY EXAM Gissell Pacheco, DO 10/23/2024 1340   5 : LEVEL 12 LN Tissue LYMPH NODE PULMONARY (SPECIFY SITE) SURGICAL PATHOLOGY EXAM Gissell Pacheco, DO 10/23/2024 1419   6 : RIGHT UPPER LOBE Tissue LUNG LOBECTOMY/SEGMENTECTOMY RIGHT (SPECIFY SITE) SURGICAL PATHOLOGY EXAM Gissell Pacheco, DO 10/23/2024 1456   7 : LEVEL 10 LN Tissue LYMPH NODE PULMONARY (SPECIFY SITE) SURGICAL PATHOLOGY EXAM Gissell Pacheco, DO 10/23/2024 1503   8 : LEVEL 4 LN Tissue LYMPH NODE PULMONARY (SPECIFY SITE) SURGICAL PATHOLOGY EXAM Gissell Pacheco, DO 10/23/2024 1507        Staff:   Circulator: Kwan Mckinnon Person: Asim Howard Scrub: Reagan Howard Circulator: Alfonso Howard Circulator: Zoila    Findings: Frozen pathology of the right upper lobe suggestive of squamous cell carcinoma. See Dr. Pacheco's note for further details.    Complications:  None; patient tolerated the procedure well.     Disposition:  PACU - hemodynamically stable.  Condition: stable    I was the bedside assist for Dr. Pacheco's robotic assisted right upper lobectomy and lymph node dissection.    Merle Cosby PA-C

## 2024-10-23 NOTE — ANESTHESIA PREPROCEDURE EVALUATION
Patient: Brianne Mallory    Procedure Information       Date/Time: 10/23/24 1040    Procedure: Resection Robot-Assisted Lung Lobe (Right: Chest) - ROBOTIC LOBECTOMY RIGHT    [facility]    Surgeons: Gissell Pacheco, DO            Relevant Problems   Anesthesia (within normal limits)      Cardiac   (+) Essential hypertension   (+) HLD (hyperlipidemia)      Pulmonary   (+) Malignant neoplasm of upper lobe of left lung (Multi)      Neuro   (+) Depression      Endocrine   (+) Hypothyroid      Hematology   (+) Anemia due to blood loss   (+) Deep vein thrombosis (DVT) of right lower extremity (Multi)      Musculoskeletal   (+) Osteoarthritis of left knee      Coag and Thromboembolic   (+) History of pulmonary embolism      Musculoskeletal and Injuries   (+) Status post total left knee replacement       Clinical information reviewed:   Tobacco  Allergies  Meds   Med Hx  Surg Hx   Fam Hx  Soc Hx        NPO Detail:  NPO/Void Status  Date of Last Liquid: 10/22/24  Time of Last Liquid: 2000  Date of Last Solid: 10/22/24  Time of Last Solid: 2000         PHYSICAL EXAM    Anesthesia Plan    History of general anesthesia?: yes  History of complications of general anesthesia?: no    ASA 3     general     Anesthetic plan and risks discussed with patient.  Use of blood products discussed with patient who.    Plan discussed with resident.

## 2024-10-23 NOTE — ANESTHESIA PROCEDURE NOTES
Arterial Line:    Date/Time: 10/23/2024 12:00 PM    Staffing  Performed: attending   Authorized by: Richard Waters MD    Performed by: Yair Gomes MD    An arterial line was placed. Procedure performed using surface landmarks.in the OR for the following indication(s): continuous blood pressure monitoring.    A 20 gauge (size), 1 and 3/4 inch (length), Angiocath (type) catheter was placed into the Left radial artery, secured by Tegaderm,   Seldinger technique used.  Events:  patient tolerated procedure well with no complications.

## 2024-10-23 NOTE — ANESTHESIA POSTPROCEDURE EVALUATION
Patient: Brianne Mallory    Procedure Summary       Date: 10/23/24 Room / Location: [facility] OR 14 / [facility] OR    Anesthesia Start: 1143 Anesthesia Stop: 1604    Procedure: Robot-Assisted Right Upper Lobectomy with Lymph Node Dissection (Right: Chest) Diagnosis:       Mass of upper lobe of right lung      (Mass of upper lobe of right lung [R91.8])    Surgeons: Gissell Pacheco DO Responsible Provider: Richard Waters MD    Anesthesia Type: general ASA Status: 3            Anesthesia Type: general    Vitals Value Taken Time   BP 84/48 10/23/24 1612   Temp 36.2 10/23/24 1617   Pulse 57 10/23/24 1614   Resp 19 10/23/24 1614   SpO2 90 % 10/23/24 1614   Vitals shown include unfiled device data.    Anesthesia Post Evaluation    Patient location during evaluation: PACU  Patient participation: complete - patient participated  Level of consciousness: sleepy but conscious  Pain score: 2  Pain management: adequate  Multimodal analgesia pain management approach  Airway patency: patent  Two or more strategies used to mitigate risk of obstructive sleep apnea  Cardiovascular status: acceptable and stable  Respiratory status: acceptable, room air, airway suctioned and nonlabored ventilation  Hydration status: acceptable  Postoperative Nausea and Vomiting: none        There were no known notable events for this encounter.

## 2024-10-23 NOTE — H&P
History Of Present Illness  Brianne Mallory is an 79 y.o. female with a past medical history of GERTRUDE pT1cN0 NSCLC s/p lobectomy by Dr. Martinez on 1/21/21, anxiety, COPD, HTN, HLD, hypothyroidism, and a growing RUL nodule. Pt is here today for a right robotic assisted upper lobectomy and mediastinal lymph node dissection with Dr. Pacheco.     Home medications reviewed:   Pt typically takes Vit D3, B12, levothyroxine 88mcg daily, Zoloft 20mg daily, and simvastatin 40mg daily, but she has not taken any of her home medications today.     Otherwise, pt reports no changes in her past medical history besides that she does not take Protonix and has never been diagnosed with GERD.    Past Medical History  Past Medical History:   Diagnosis Date    Anxiety     Cataract     Clotting disorder (Multi)     COPD (chronic obstructive pulmonary disease) (Multi)     Coronary artery calcification seen on CT scan     GERD (gastroesophageal reflux disease)     History of blood transfusion     History of cancer of upper lobe bronchus or lung     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 with completion lobectomy on 1/21/21    Hyperlipidemia     Hypertension     Hypothyroidism     Mass of upper lobe of right lung     RUL mass measuring currently 4.8 x 3.3 cm    Sprain of ankle, left 09/11/2024     Surgical History  Past Surgical History:   Procedure Laterality Date    BRONCHOSCOPY      CATARACT EXTRACTION      KNEE ARTHROPLASTY      right an dleft    OTHER SURGICAL HISTORY  12/28/2020    Biopsy    OTHER SURGICAL HISTORY Left 01/21/2021    GERTRUDE Lung lobectomy     Social History  She reports that she has quit smoking. Her smoking use included cigarettes. She started smoking about 5 years ago. She has a 5.5 pack-year smoking history. She has never used smokeless tobacco. She reports that she does not currently use alcohol. She reports that she does not use drugs.    Family History  Family History   Problem Relation Name  "Age of Onset    Hypertension Mother      Heart attack Father      Lung cancer Maternal Grandmother      Diabetes Paternal Grandmother          Allergies  Aspirin, Ibuprofen, Nickel, Sulfa (sulfonamide antibiotics), and Penicillins    Review of Systems   All other systems reviewed and are negative.    Physical Exam  Constitutional:       General: She is not in acute distress.     Comments: Oriented x3, resting comfortably in bed   HENT:      Head: Normocephalic and atraumatic.   Neck:      Comments: No JVD or tracheal deviation  Cardiovascular:      Comments: Regular rate and rhythm on telemetry   Pulmonary:      Comments: No accessory muscle use to breathe. On room air.  Musculoskeletal:         General: No tenderness.      Right lower leg: No edema.      Left lower leg: No edema.   Skin:     Comments: Warm and dry.    Neurological:      Mental Status: She is alert.   Psychiatric:      Comments: Appropriate mood and behavior       Last Recorded Vitals  Blood pressure 160/76, pulse 55, temperature 36 °C (96.8 °F), temperature source Temporal, resp. rate 16, height 1.727 m (5' 8\"), weight 98.7 kg (217 lb 9.5 oz), SpO2 96%.    Relevant Results  Scheduled medications  acetaminophen, 1,000 mg, intravenous, Once  ceFAZolin, 2 g, intravenous, Once      Continuous medications  dexmedeTOMIDine, 0.1-1.5 mcg/kg/hr  lactated Ringer's, 20 mL/hr      PRN medications  PRN medications: fentaNYL PF, propofol     Results for orders placed or performed in visit on 10/21/24 (from the past 24 hours)   Prepare RBC   Result Value Ref Range    PRODUCT CODE Y4259F83     Unit Number [id number]     Unit ABO A     Unit RH NEG     XM INTEP COMP     Dispense Status XM     Blood Expiration Date 11/4/2024 11:59:00 PM EST     PRODUCT BLOOD TYPE 0600     UNIT VOLUME 350     PRODUCT CODE K4128L80     Unit Number [id number]     Unit ABO A     Unit RH NEG     XM INTEP COMP     Dispense Status XM     Blood Expiration Date 11/3/2024 11:59:00 PM " EST     PRODUCT BLOOD TYPE 0600     UNIT VOLUME 329        Assessment/Plan   Assessment & Plan  Mass of upper lobe of right lung    Brianne Mallory is an 79 y.o. female with a past medical history of GERTRUDE pT1cN0 NSCLC s/p lobectomy by Dr. Martinez on 1/21/21, anxiety, COPD, HTN, HLD, hypothyroidism, and a growing RUL nodule.     Plan:   - OR today with Dr. Pacheco for a right robotic assisted upper lobectomy and mediastinal lymph node dissection  - Admit to [facility] 3 postop  - Subcutaneous heparin and SCDs for DVT prophylaxis  - Perioperative antibiotics x3    Merle Cosby PA-C

## 2024-10-23 NOTE — ANESTHESIA PROCEDURE NOTES
Airway  Date/Time: 10/23/2024 12:01 PM  Urgency: elective    Airway not difficult    Staffing  Performed: resident   Authorized by: Richard Waters MD    Performed by: Yair Gomes MD  Patient location during procedure: OR    Indications and Patient Condition  Indications for airway management: anesthesia and airway protection  Spontaneous Ventilation: absent  Sedation level: deep  Preoxygenated: yes  Patient position: sniffing  Mask difficulty assessment: 1 - vent by mask  Planned trial extubation    Final Airway Details  Final airway type: endotracheal airway      Successful airway: ETT - double lumen left  Cuffed: yes   Successful intubation technique: direct laryngoscopy  Facilitating devices/methods: intubating stylet  Endotracheal tube insertion site: oral  Blade: Snehal  Blade size: #3  ETT DL size (fr): 37  Cormack-Lehane Classification: grade I - full view of glottis  Placement verified by: chest auscultation, bronchoscopy and capnometry   Measured from: lips  ETT to lips (cm): 25  Number of attempts at approach: 1  Number of other approaches attempted: 0

## 2024-10-24 ENCOUNTER — APPOINTMENT (OUTPATIENT)
Dept: RADIOLOGY | Facility: HOSPITAL | Age: 79
End: 2024-10-24
Payer: MEDICARE

## 2024-10-24 LAB
ANION GAP SERPL CALC-SCNC: 12 MMOL/L (ref 10–20)
BUN SERPL-MCNC: 12 MG/DL (ref 6–23)
CALCIUM SERPL-MCNC: 9 MG/DL (ref 8.6–10.6)
CHLORIDE SERPL-SCNC: 101 MMOL/L (ref 98–107)
CO2 SERPL-SCNC: 28 MMOL/L (ref 21–32)
CREAT SERPL-MCNC: 0.68 MG/DL (ref 0.5–1.05)
EGFRCR SERPLBLD CKD-EPI 2021: 88 ML/MIN/1.73M*2
ERYTHROCYTE [DISTWIDTH] IN BLOOD BY AUTOMATED COUNT: 12.9 % (ref 11.5–14.5)
GLUCOSE SERPL-MCNC: 109 MG/DL (ref 74–99)
HCT VFR BLD AUTO: 34.2 % (ref 36–46)
HGB BLD-MCNC: 10.5 G/DL (ref 12–16)
MAGNESIUM SERPL-MCNC: 1.61 MG/DL (ref 1.6–2.4)
MCH RBC QN AUTO: 29.7 PG (ref 26–34)
MCHC RBC AUTO-ENTMCNC: 30.7 G/DL (ref 32–36)
MCV RBC AUTO: 97 FL (ref 80–100)
NRBC BLD-RTO: 0 /100 WBCS (ref 0–0)
PLATELET # BLD AUTO: 347 X10*3/UL (ref 150–450)
POTASSIUM SERPL-SCNC: 4.1 MMOL/L (ref 3.5–5.3)
RBC # BLD AUTO: 3.54 X10*6/UL (ref 4–5.2)
SODIUM SERPL-SCNC: 137 MMOL/L (ref 136–145)
WBC # BLD AUTO: 11.6 X10*3/UL (ref 4.4–11.3)

## 2024-10-24 PROCEDURE — 1200000002 HC GENERAL ROOM WITH TELEMETRY DAILY

## 2024-10-24 PROCEDURE — 2500000004 HC RX 250 GENERAL PHARMACY W/ HCPCS (ALT 636 FOR OP/ED): Performed by: NURSE PRACTITIONER

## 2024-10-24 PROCEDURE — 83735 ASSAY OF MAGNESIUM: CPT | Performed by: PHYSICIAN ASSISTANT

## 2024-10-24 PROCEDURE — 32674 THORACOSCOPY LYMPH NODE EXC: CPT | Performed by: THORACIC SURGERY (CARDIOTHORACIC VASCULAR SURGERY)

## 2024-10-24 PROCEDURE — 36415 COLL VENOUS BLD VENIPUNCTURE: CPT | Performed by: PHYSICIAN ASSISTANT

## 2024-10-24 PROCEDURE — 97530 THERAPEUTIC ACTIVITIES: CPT | Mod: GO

## 2024-10-24 PROCEDURE — 2500000001 HC RX 250 WO HCPCS SELF ADMINISTERED DRUGS (ALT 637 FOR MEDICARE OP): Performed by: PHYSICIAN ASSISTANT

## 2024-10-24 PROCEDURE — 71045 X-RAY EXAM CHEST 1 VIEW: CPT

## 2024-10-24 PROCEDURE — 2500000002 HC RX 250 W HCPCS SELF ADMINISTERED DRUGS (ALT 637 FOR MEDICARE OP, ALT 636 FOR OP/ED): Performed by: PHYSICIAN ASSISTANT

## 2024-10-24 PROCEDURE — 80048 BASIC METABOLIC PNL TOTAL CA: CPT | Performed by: PHYSICIAN ASSISTANT

## 2024-10-24 PROCEDURE — 2500000004 HC RX 250 GENERAL PHARMACY W/ HCPCS (ALT 636 FOR OP/ED): Performed by: ANESTHESIOLOGY

## 2024-10-24 PROCEDURE — 99231 SBSQ HOSP IP/OBS SF/LOW 25: CPT | Performed by: PHYSICIAN ASSISTANT

## 2024-10-24 PROCEDURE — 2500000004 HC RX 250 GENERAL PHARMACY W/ HCPCS (ALT 636 FOR OP/ED): Mod: JZ | Performed by: PHYSICIAN ASSISTANT

## 2024-10-24 PROCEDURE — 32663 THORACOSCOPY W/LOBECTOMY: CPT | Performed by: THORACIC SURGERY (CARDIOTHORACIC VASCULAR SURGERY)

## 2024-10-24 PROCEDURE — 97165 OT EVAL LOW COMPLEX 30 MIN: CPT | Mod: GO

## 2024-10-24 PROCEDURE — 85027 COMPLETE CBC AUTOMATED: CPT | Performed by: PHYSICIAN ASSISTANT

## 2024-10-24 RX ORDER — TRAMADOL HYDROCHLORIDE 50 MG/1
50 TABLET ORAL EVERY 6 HOURS PRN
Status: DISCONTINUED | OUTPATIENT
Start: 2024-10-24 | End: 2024-10-25 | Stop reason: HOSPADM

## 2024-10-24 RX ORDER — LANOLIN ALCOHOL/MO/W.PET/CERES
400 CREAM (GRAM) TOPICAL ONCE
Status: COMPLETED | OUTPATIENT
Start: 2024-10-24 | End: 2024-10-24

## 2024-10-24 RX ORDER — PANTOPRAZOLE SODIUM 40 MG/1
40 TABLET, DELAYED RELEASE ORAL
Status: DISCONTINUED | OUTPATIENT
Start: 2024-10-24 | End: 2024-10-25 | Stop reason: HOSPADM

## 2024-10-24 ASSESSMENT — COGNITIVE AND FUNCTIONAL STATUS - GENERAL
HELP NEEDED FOR BATHING: A LITTLE
STANDING UP FROM CHAIR USING ARMS: A LITTLE
CLIMB 3 TO 5 STEPS WITH RAILING: A LITTLE
DRESSING REGULAR LOWER BODY CLOTHING: A LITTLE
MOBILITY SCORE: 18
TOILETING: A LOT
TURNING FROM BACK TO SIDE WHILE IN FLAT BAD: A LITTLE
DAILY ACTIVITIY SCORE: 15
DAILY ACTIVITIY SCORE: 19
MOVING TO AND FROM BED TO CHAIR: A LITTLE
EATING MEALS: A LITTLE
WALKING IN HOSPITAL ROOM: A LITTLE
DRESSING REGULAR LOWER BODY CLOTHING: A LOT
TOILETING: A LITTLE
DRESSING REGULAR UPPER BODY CLOTHING: A LITTLE
HELP NEEDED FOR BATHING: A LOT
DRESSING REGULAR UPPER BODY CLOTHING: A LITTLE
MOVING FROM LYING ON BACK TO SITTING ON SIDE OF FLAT BED WITH BEDRAILS: A LITTLE
PERSONAL GROOMING: A LITTLE
PERSONAL GROOMING: A LITTLE

## 2024-10-24 ASSESSMENT — PAIN - FUNCTIONAL ASSESSMENT
PAIN_FUNCTIONAL_ASSESSMENT: 0-10

## 2024-10-24 ASSESSMENT — ACTIVITIES OF DAILY LIVING (ADL)
ADL_ASSISTANCE: INDEPENDENT
LACK_OF_TRANSPORTATION: NO

## 2024-10-24 ASSESSMENT — PAIN SCALES - GENERAL
PAINLEVEL_OUTOF10: 6
PAINLEVEL_OUTOF10: 0 - NO PAIN
PAINLEVEL_OUTOF10: 5 - MODERATE PAIN
PAINLEVEL_OUTOF10: 0 - NO PAIN

## 2024-10-24 NOTE — PROGRESS NOTES
"Thoracic Surgery Progress Note  10/24/2024    Brianne Mallory is a 79 y.o. female now POD#1 s/p a robotic right upper lobectomy.      Overnight issues: admitted to [facility] from PACU    Physical Exam:  General: She is a pleasant female currently in no distress.  Visit Vitals  /65 (BP Location: Right arm, Patient Position: Lying)   Pulse 61   Temp 35.7 °C (96.3 °F) (Temporal)   Resp 17   Ht 1.727 m (5' 8\")   Wt 101 kg (223 lb)   SpO2 98%   BMI 33.91 kg/m²   Smoking Status Former   BSA 2.2 m²     Body mass index is 33.91 kg/m².   HEENT: Normocephalic and atraumatic.   NECK: Supple. Trach midline. No JVD.   CHEST: Breathing comfortably on 2L via NC. Lungs diminished in bases bilat, with rhonci bilat.  Chest tube with 180ml drainage since OR, no air leak appreciated  HEART: Regular rate and rhythm. NSR with rate in 60s per tele review.   ABDOMEN: Soft, ND, nontender, +BS  NEUROLOGIC: Alert and oriented. Grossly intact.   EXTREMITIES: Moves all extremities equally. Bilat knee tenderness - chronic, No calf tenderness.     Diagnostics:     Intake/Output Summary (Last 24 hours) at 10/24/2024 0833  Last data filed at 10/24/2024 0547  Gross per 24 hour   Intake 203.33 ml   Output 1395 ml   Net -1191.67 ml               No lab exists for component: \"LABGLOM\"      No lab exists for component: \"LABGLOM\"  Scheduled medications  ceFAZolin, , ,   ceFAZolin, , ,   esmolol, , ,   fentaNYL PF, , ,   glycopyrrolate, , ,   [Held by provider] heparin (porcine), 5,000 Units, subcutaneous, q8h  levothyroxine, 88 mcg, oral, Daily  lidocaine (cardiac), , ,   lidocaine, 0.1 mL, subcutaneous, Once  metoprolol tartrate, 12.5 mg, oral, q8h ZEINA  pantoprazole, 40 mg, oral, Daily before breakfast  rocuronium, , ,   sennosides-docusate sodium, 2 tablet, oral, BID  sertraline, 25 mg, oral, Daily  simvastatin, 40 mg, oral, Nightly      Continuous medications  dexmedeTOMIDine, 0.1-1.5 mcg/kg/hr, Last Rate: Stopped (10/23/24 8765)      PRN " medications  PRN medications: acetaminophen, ceFAZolin, ceFAZolin, esmolol, fentaNYL PF, glycopyrrolate, ipratropium-albuteroL, lidocaine (cardiac), naloxone, ondansetron, ondansetron, oxygen, rocuronium, traMADol    Imaging:   AM CXR reviewed. Expected post op changes and chest tube placement. No clinically significant pneumothorax.  Remains stable when compared with previous exam.    Assessment:  Brianne Mallory is a 79 y.o. female now POD#1 s/p a robotic right upper lobectomy.      Plan:  Neurology: post operative pain  -Discontinue PCA pump  -start PO analgesics  -Bowel regimen available for constipation secondary to pain medication   -Out of bed to the chair throughout the day  -Encourage ambulation as tolerated    Cardiovascular: h/o HTN, HLD  -Continue telemetry  -Vital signs every four hours  -Continue metoprolol 12.5mg Q8h for post operative arrhythmia prophylaxis   -Continue home regimen of simvastatin  -Replace electrolytes as needed for K >4, Mg >2    Pulmonology: post op atelectasis, chest tube management,   -Encourage incentive spirometer use every hour  -Continue pulmonary hygiene  -Wean oxygen as tolerated   -Maintain chest tube to waterseal  -Obtain daily CXR  -Monitor and record chest tube drainage every shift    Gastrointestinal:   -regular diet as tolerated  -Zofran available for nausea  -scheduled colace, PRN bowel regimen  -resume home regimen of Protonix    Genitourinary:   -Remove hernandez  -Await spontaneous void trial  -Continue to monitor daily electrolytes with routine BMPs   -monitor I's and O's closely    Infectious Disease: afebrile; slight leukocytosis - likely post-op related  -Continue to trend daily temperatures and WBC count to monitor for signs of post-operative infection   -Monitor surgical incisions for signs of infection   -Perioperative antibiotics completed     Hematology/DVT Prophylaxis:   -Continue subcutaneous heparin and SCDs, early ambulation:   -Monitor for signs of acute  blood loss  -Trend CBCs     Psych: h/o Anxiety  -cont Zoloft  -consult Psych PRN    Disposition:  -Plan for discharge to home once stable from a surgical standpoint.  -Continue to assess for home-going needs     Patient seen and examined by this provider.     Christopher Guerra PA-C  Thoracic Surgery, #[id number]

## 2024-10-24 NOTE — PROGRESS NOTES
"Occupational Therapy    Evaluation/Treatment    Patient Name: Brianen Mallory  MRN: [id number]  Today's Date: 10/24/2024  Time Calculation  Start Time: 0909  Stop Time: 0949  Time Calculation (min): 40 min    Assessment  IP OT Assessment  OT Assessment: Impaired ADLs anticipate max assist x 1 with lower body ADLs, required min assist with upper body ADLs, mod assist with supine to sit and min assist with sit to supine bed mobility, pt required min assist x 1 with functional transfers.  Prognosis: Good  Evaluation/Treatment Tolerance: Other (Comment) (Pt passed out after short ambulation while sitting in a chair, team is aware, RN was at bedside.)  Medical Staff Made Aware: Yes  End of Session Communication: Bedside nurse  End of Session Patient Position: Bed, 3 rail up, Alarm off, caregiver present  Plan:  Treatment Interventions: ADL retraining, Functional transfer training, Endurance training  OT Frequency: 3 times per week  OT Discharge Recommendations: Other (Comment) (will continue to assess pt's progress towards her goals.)  OT Recommended Transfer Status: Minimal assist, Assist of 1  OT - OK to Discharge: Yes    Subjective     Current Problem:  1. Mass of upper lobe of right lung  Surgical Pathology Exam    Surgical Pathology Exam          General:  Reason for Referral: s/p a robotic right upper lobectomy.  Past Medical History Relevant to Rehab: Mass of upper lobe of right lung  Prior to Session Communication: Bedside nurse  Patient Position Received: Bed, 3 rail up, Alarm on  Family/Caregiver Present: No  General Comment: Pt was initially in supine with HOB elevated, reported she is \"going home today\". Pt was agreeable to participate, completed functional bed mobility, denied any dizziness after sit to stand transfer, able to complete short distance ambulation to outside of her room and once returned to back to the room pt reported dizziness, pt was assisted to seated position, and upon sitting pt started to " become non-responsive, RN was notified. HR went down to 34 at one point, and went back up to 59 while RN was at bedside. Team was contacted by RN. Pt started to talk and reported less dizziness at the end of the session.  This OT returned for the second time to assist pt to return back to bed as RN wanted the pt back to bed due to BP still low in seated position.     Precautions:  Medical Precautions: Fall precautions  Precautions Comment: Has a chest tube    Pain:  Pain Assessment  Pain Assessment: 0-10  0-10 (Numeric) Pain Score:  (not rated complain of sx site pain.)  Pain Type: Acute pain, Surgical pain      Objective   Cognition:  Overall Cognitive Status: Within Functional Limits  Safety/Judgement: Within Functional Limits    Home Living:  Type of Home: House  Bathroom Shower/Tub: Tub/shower unit  Bathroom Equipment: Shower chair with back     Prior Function:  Level of [place]: Independent with ADLs and functional transfers  ADL Assistance: Independent  Ambulatory Assistance: Independent (uses a cane at baseline.)  Hand Dominance: Right  Prior Function Comments: no falls    ADL:  Eating Assistance: Independent  Grooming Assistance: Independent  UE Dressing Assistance: Stand by  UE Dressing Deficit: Setup, Thread RUE, Thread LUE (at the side of the bed.)    Activity Tolerance:  Endurance: Decreased tolerance for upright activites    Balance:  Dynamic Sitting Balance  Dynamic Sitting-Balance Support: Bilateral upper extremity supported  Dynamic Sitting-Level of Assistance: Contact guard  Dynamic Standing Balance  Dynamic Standing-Balance Support: Bilateral upper extremity supported  Dynamic Standing-Level of Assistance: Contact guard  Dynamic Standing-Comments: using a wheeled walker  Static Sitting Balance  Static Sitting-Balance Support: No upper extremity supported  Static Sitting-Level of Assistance: Independent  Static Standing Balance  Static Standing-Balance Support: Bilateral upper extremity  supported  Static Standing-Level of Assistance: Contact guard  Static Standing-Comment/Number of Minutes: wheeled walker    Bed Mobility/Transfers: Bed Mobility  Bed Mobility: Yes  Bed Mobility 1  Bed Mobility 1: Supine to sitting  Level of Assistance 1: Moderate assistance, Minimal verbal cues  Bed Mobility Comments 1: HOB elevated  Bed Mobility 2  Bed Mobility  2: Sitting to supine  Level of Assistance 2: Minimum assistance, Minimal verbal cues  Bed Mobility Comments 2: HOB elevated  Bed Mobility 3  Bed Mobility 3: Scooting  Level of Assistance 3: Maximum assistance, Minimal verbal cues   and Transfers  Transfer: Yes  Transfer 1  Transfer From 1: Bed to  Transfer to 1: Stand  Technique 1: Sit to stand  Transfer Device 1: Walker  Transfer Level of Assistance 1: Minimum assistance, Minimal verbal cues  Transfers 2  Transfer From 2: Stand to  Transfer to 2: Chair with arms  Technique 2: Stand pivot  Transfer Device 2: Walker  Transfer Level of Assistance 2: Contact guard, Minimal verbal cues  Transfers 3  Transfer From 3: Stand to  Transfer to 3: Chair with arms  Technique 3: Stand to sit  Transfer Device 3: Walker  Transfer Level of Assistance 3: Contact guard, Minimal verbal cues  Transfers 4  Transfer From 4: Stand to  Transfer to 4: Bed  Technique 4: Stand to sit  Transfer Device 4: Walker  Transfer Level of Assistance 4: Contact guard, Minimal verbal cues    Vision:     and Vision - Complex Assessment  Ocular Range of Motion: Within Functional Limits    Sensation:  Light Touch: No apparent deficits    Coordination:  Movements are Fluid and Coordinated: Yes     Hand Function:  Hand Function  Gross Grasp: Functional  Coordination: Functional    Extremities: RUE   RUE : Within Functional Limits, LUE   LUE: Within Functional Limits,  , and      Outcome Measures: [facility] Daily Activity  Putting on and taking off regular lower body clothing: A lot  Bathing (including washing, rinsing, drying): A lot  Putting on and  taking off regular upper body clothing: A little  Toileting, which includes using toilet, bedpan or urinal: A lot  Taking care of personal grooming such as brushing teeth: A little  Eating Meals: A little  Daily Activity - Total Score: 15         ,     OT Adult Other Outcome Measures  4AT: negative    Education Documentation  Body Mechanics, taught by Chary Obrien OT at 10/24/2024  2:08 PM.  Learner: Patient  Readiness: Acceptance  Method: Explanation  Response: Verbalizes Understanding  Comment: Pt was educated on adaptive ways to complete ADLs and bed mobility due to recent sx.    ADL Training, taught by Chary Obrien OT at 10/24/2024  2:08 PM.  Learner: Patient  Readiness: Acceptance  Method: Explanation  Response: Verbalizes Understanding  Comment: Pt was educated on adaptive ways to complete ADLs and bed mobility due to recent sx.    Body Mechanics, taught by Chary Obrien OT at 10/24/2024  2:08 PM.  Learner: Patient  Readiness: Acceptance  Method: Explanation  Response: Verbalizes Understanding    ADL Training, taught by Chary Obrien OT at 10/24/2024  2:08 PM.  Learner: Patient  Readiness: Acceptance  Method: Explanation  Response: Verbalizes Understanding    Education Comments  No comments found.        Goals:   Encounter Problems       Encounter Problems (Active)       ADLs       Patient will perform UB and LB bathing  with modified independent level of assistance and grab bars. (Progressing)       Start:  10/24/24    Expected End:  11/14/24            Patient with complete lower body dressing with modified independent level of assistance donning and doffing all LE clothes  with PRN adaptive equipment while edge of bed  (Progressing)       Start:  10/24/24    Expected End:  11/14/24            Patient will complete toileting including hygiene clothing management/hygiene with modified independent level of assistance and grab bars. (Progressing)       Start:  10/24/24    Expected End:  11/14/24                BALANCE       Pt will maintain dynamic standing balance during ADL task with modified independent level of assistance in order to demonstrate decreased risk of falling and improved postural control. (Progressing)       Start:  10/24/24    Expected End:  11/14/24            Patient will tolerate standing for 10 minutes to modified independent level of assistance with least restrictive device in order to improve functional activity tolerance for ADL tasks. (Progressing)       Start:  10/24/24    Expected End:  11/14/24               MOBILITY       Patient will perform Functional mobility min Household distances/Community Distances with modified independent level of assistance and least restrictive device in order to improve safety and functional mobility. (Progressing)       Start:  10/24/24    Expected End:  11/14/24               TRANSFERS       Patient will perform bed mobility modified independent level of assistance and bed rails in order to improve safety and independence with mobility (Progressing)       Start:  10/24/24    Expected End:  11/14/24            Patient will complete sit to stand transfer with modified independent level of assistance and least restrictive device in order to improve safety and prepare for out of bed mobility. (Progressing)       Start:  10/24/24    Expected End:  11/14/24                   Treatment Completed on Evaluation    Therapy/Activity:      Pt completed functional ambulation from her room to the outside of her room using a wheeled walker with good dynamic standing balance. Pt then became dizzy, assisted back to seated position. Increased time spent with assisting pt with functional transfers back to bed from the chair with min assist x 1 using a wheeled walker while vitals were monitored during the session.           10/24/24 at 2:11 PM   Chary WINTER/L, OTCRISTIAN  Rehab Office: [phone]

## 2024-10-24 NOTE — SIGNIFICANT EVENT
Patient up with PT. When getting back to chair the patients HR dipped to 30s on tele, MAPs in 40s and patient experienced syncopal episode. Pt experienced slurred speech temporarily and slow but accurate to answer orientation questions. Pt treated with PO fluids and 500ml IV fluid bolus. Pt eventually assisted back to bed and BP continuously monitored.

## 2024-10-24 NOTE — OP NOTE
Robot-Assisted Right Upper Lobectomy with Lymph Node Dissection (R) Operative Note     Date: 10/23/2024  OR Location: [facility] OR    Name: Brianne Mallory, : 1945, Age: 79 y.o., MRN: [id number], Sex: female    Diagnosis  Pre-op Diagnosis      * Mass of upper lobe of right lung [R91.8] Post-op Diagnosis     * Mass of upper lobe of right lung [R91.8]     Procedures  Robot-Assisted Right Upper Lobectomy with Lymph Node Dissection  [id number] - MD THORACOSCOPY W/LOBECTOMY SINGLE LOBE  Bronchoscopy    Surgeons      * Gissell Pacheco - Primary    Resident/Fellow/Other Assistant:  Surgeons and Role:     * Joshua Zhu MD - Assisting - present for dissection of critical structures given the difficulty of the case and proximity of cancer to the hilar structures     * JUANJOSE Bashir-C - VU First Assist - present for entire case as first assist and bedside first assist as no qualified resident or fellow was available    Procedure Summary  Anesthesia: General  ASA: III  Anesthesia Staff: Anesthesiologist: Richard Waters MD  CRNA: CHEYANNE Stafford-CRNA  Anesthesia Resident: Yair Gomes MD  Estimated Blood Loss: 400mL  Intra-op Medications:   Administrations occurring from 1040 to 1455 on 10/23/24:   Medication Name Total Dose   sodium chloride 0.9 % irrigation solution 1,000 mL   dexmedeTOMIDine (Precedex) 400 mcg in 100 mL (4 mcg/mL) sodium chloride 0.9% infusion 111.86 mcg   albumin human infusion  - Omnicell Override Pull Cannot be calculated   dexAMETHasone (Decadron) injection  - Omnicell Override Pull Cannot be calculated   phenylephrine HCl in 0.9% NaCl syringe  - Omnicell Override Pull Cannot be calculated   phenylephrine HCl in 0.9% NaCl syringe  - Omnicell Override Pull Cannot be calculated   propofol (Diprivan) injection  - Omnicell Override Pull Cannot be calculated   albumin human 5 % 250 mL   ceFAZolin (Ancef) vial 1 g 2 g   dexAMETHasone (Decadron) injection 4 mg/mL 10 mg    glycopyrrolate (Robinul) injection 0.4 mg   lactated Ringer's infusion 737.5 mL   lidocaine (Xylocaine) injection 2 % 100 mg   phenylephrine (Adrián-Synephrine) 10 mg in sodium chloride 0.9% 250 mL (0.04 mg/mL) infusion (premix) 2.21 mg   phenylephrine 40 mcg/mL syringe 10 mL 800 mcg   propofol (Diprivan) injection 10 mg/mL 200 mg   rocuronium (ZeMuron) 50 mg/5 mL injection 100 mg              Anesthesia Record               Intraprocedure I/O Totals          Intake    Dexmedetomidine 0.00 mL    The total shown is the total volume documented since Anesthesia Start was filed.    Phenylephrine Drip 0.00 mL    The total shown is the total volume documented since Anesthesia Start was filed.    acetaminophen (Ofirmev) injection 1,000 mg 100.00 mL    Total Intake 100 mL       Output    Urine 200 mL    Est. Blood Loss 400 mL    Total Output 600 mL       Net    Net Volume -500 mL          Specimen:   ID Type Source Tests Collected by Time   1 : LEVEL 9 LYMPH NODE Tissue LYMPH NODE PULMONARY (SPECIFY SITE) SURGICAL PATHOLOGY EXAM Gissell aPcheco, DO 10/23/2024 1302   2 : LEVEL 8 LYMPH NODE Tissue LYMPH NODE PULMONARY (SPECIFY SITE) SURGICAL PATHOLOGY EXAM Gissell Pacheco, DO 10/23/2024 1302   3 : LEVEL 7 LYMPH NODE Tissue LYMPH NODE PULMONARY (SPECIFY SITE) SURGICAL PATHOLOGY EXAM Gissell Pacheco, DO 10/23/2024 1304   4 : LEVEL 11 LN Tissue LYMPH NODE PULMONARY (SPECIFY SITE) SURGICAL PATHOLOGY EXAM Gissell Pacheco, DO 10/23/2024 1340   5 : LEVEL 12 LN Tissue LYMPH NODE PULMONARY (SPECIFY SITE) SURGICAL PATHOLOGY EXAM Gissell Pacheco, DO 10/23/2024 1419   6 : RIGHT UPPER LOBE Tissue LUNG LOBECTOMY/SEGMENTECTOMY RIGHT (SPECIFY SITE) SURGICAL PATHOLOGY EXAM Gissell Pacheco, DO 10/23/2024 1456   7 : LEVEL 10 LN Tissue LYMPH NODE PULMONARY (SPECIFY SITE) SURGICAL PATHOLOGY EXAM Gissell Pacheco, DO 10/23/2024 1503   8 : LEVEL 4 LN Tissue LYMPH NODE PULMONARY (SPECIFY SITE) SURGICAL PATHOLOGY EXAM Gissell BLOOM  DO Tara 10/23/2024 1507        Staff:   Circulator: Kwan Mckinnon Person: Asim Howard Scrub: Reagan Howard Circulator: Alfonso Howard Circulator: Zoila         Drains and/or Catheters:   Chest Tube 1 Right Pleural 32 Fr (Active)   Function -20 cm H2O 10/23/24 1835   Chest Tube Air Leak No 10/23/24 1835   Patency Intervention Tip/tilt 10/23/24 1558   Drainage Description Serosanguineous 10/23/24 1835   Dressing Status Clean;Dry 10/23/24 1835   Site Assessment Clean;Dry;Intact 10/23/24 1835   Surrounding Skin Unable to view 10/23/24 1835   Output (mL) 19 mL 10/23/24 1700       Urethral Catheter Non-latex 16 Fr. (Active)   Site Assessment Clean;Skin intact 10/23/24 1839   Collection Container Standard drainage bag 10/23/24 1839   Securement Method Securing device (Describe) 10/23/24 1839   Reason for Continuing Urinary Catheterization comfort for end of life care 10/23/24 1558   Output (mL) 30 mL 10/23/24 1700       Tourniquet Times:         Implants:     Findings: significant scarring of hilar structures, adherence of lesion along proximal bronchus, azygous vein, and between the vascular structures  Preliminary pathology: squamous cell carcinoma    Indications: Brianne Mallory is an 79 y.o. female who is having surgery for Mass of upper lobe of right lung [R91.8]. Her course has been very complicated - Patient had a PET scan showing avidity and a biopsy which was non-diagnostic. At that time patient/daughter were unsure she would want to pursue surgery even if a cancer was diagnosed. We continued to survey this lesion and it showed ongoing growth. She was treated with multiple rounds of antibiotics in case this was a purely infectious lesion. Again there was growth on imaging; repeat biopsy was non-diagnostic however she was referred to Radiation Oncology given the concern about an underlying malignancy however without biopsy proven malignancy/patient goals of care and after several MDTB conversations  ultimately patient was referred back to me for surgery. Her PFTS and cardiac work up were wnl and patient and daughter were agreeable to surgical intervention.     The patient was seen in the preoperative area. The risks, benefits, complications, treatment options, non-operative alternatives, expected recovery and outcomes were discussed with the patient. The possibilities of reaction to medication, pulmonary aspiration, injury to surrounding structures, bleeding, recurrent infection, the need for additional procedures, failure to diagnose a condition, and creating a complication requiring transfusion or operation were discussed with the patient. The patient concurred with the proposed plan, giving informed consent.  The site of surgery was properly noted/marked if necessary per policy. The patient has been actively warmed in preoperative area. Preoperative antibiotics have been ordered and given within 1 hours of incision. Venous thrombosis prophylaxis have been ordered including bilateral sequential compression devices and chemical prophylaxis    Procedure Details: Patient was brought into the operating suite and procedural information was confirmed. General anesthesia was induced and a double lumen endotracheal tube was placed. Bronchoscopy was performed showing overall normal segmental anatomy however there appeared to be external compression on the upper lobe segmental bronchi which made full evaluation difficult. Appropriate IV and arterial access placed. Patient was transitioned to the left lateral decubitus position and appropriately padded. Her right chest was prepped and draped in the typical sterile fashion. We entered the chest in the 8th ICS along the posterior axillary line. Additional 12mm robotic ports were placed anterior and posterior to this. Our most posterior 8mm port was placed above the paraspinal musculature. A 12mm assist port was placed between arms 2 and 3. The robot was docked and  instruments were inserted. Lesion was palpated in the upper lobe. We initially tried to find the ongoing pulmonary artery within the fissure and work proximally however she had a thick incomplete major fissure. We turned our attention to the anterior hilum and identified the superior pulmonary vein along with an additional previous branch.  And began to circumferentially dissect these.  The smaller superior branch was dissected and divided with a white vascular staple load.  The superior pulmonary vein was significantly adhered to the pulmonary artery posteriorly.  There was a small amount of bleeding from the space so we turned our attention to the posterior aspect of the lung.  We released the infrapulmonary ligament and removed here by level 9 and 8 lymph nodes.  We carried this dissection cephalad towards the subcarinal space and removed a level 7 lymph node packet.  We opened the posterior pleura overlying the membranous airways.  Adhesions between the upper lobe and posterior chest wall were taken down including the parietal pleura.  We carried our dissection up towards the apex pleura over the mass was significantly adherent to the azygos vein.  We did circumferentially dissect this azygos vein in case we needed to transect it.  I attempted to find the level 11 sump node between the crotch of the upper lobe bronchus and bronchus intermedius however the tissue here was significant lymph apparent and scarred in place.  I turned my attention back to the anterior hilum.  At this point with ongoing area of small bleeding from the superior pulmonary vein I called my partner, Dr. Mota, and to assist.  He was able to complete the posterior dissection and separate the vein from the vascular structure posteriorly.  This vein was transected with a additional white robotic vascular staple load.  Nearby level 12 lymph node was removed.  There was still a small area of bruising the vascular structure, which at this point  appeared to be the ongoing pulmonary artery.  A cigar was left in place andand we turned our attention towards the apex.  He was able to peel the adherent right upper lobe away from the azygos vein.  There was a large truncus branch above the main ongoing pulmonary artery. This had 3 branches.  The entire truncus was isolated and transected again with a white robotic staple load.  Posterior to this the bronchus was identified.  Level 10 lymph node was removed.  Again the tissues here were significantly thickened, dense, and difficult to dissect.  We turned our attention to the posterior hilum again and were eventually able to circumferentially dissect what initially appeared to be the the upper lobe bronchus.  This was transected with a black staple load.  Using serial black robotic staple loads we then completed the minor followed by the major fissure.  There was one remaining structure which appeared to be an additional bronchus structure -on further review our initial bronchial staple line was likely just the apical segment.  The remaining bronchus was also transected with a black staple load. The dissection to complete this lobe required over 90 minutes of additional time due to complexity/was more difficult than 90% of other upper lobectomies.  The specimen was then placed into a large Endo Catch bag and removed through our assist port.  This was sent for frozen section and confirmed squamous cell carcinoma.  We removed a level 4R lymph node and sent this for permanent pathology.  We then carefully irrigated the chest and confirmed hemostasis.  The right upper lobe bronchial stump was submerged in saline and leak test was negative.  We performed nerve blocks and placed a 32 Mongolian chest tube into the apex.  Very carefully we watched the middle lobe and lower lobe expansion in appropriate position.  The chest tube was then secured and closed in layers.  Patient was placed supine, extubated, and taken to PACU in  stable condition.    Complications:  None; patient tolerated the procedure well.    Disposition: PACU - hemodynamically stable.  Condition: stable         Additional Details: n/a    Attending Attestation: I was present and scrubbed for the key portions of the procedure.    Gissell Pacheco  Phone Number: [phone]

## 2024-10-24 NOTE — PROGRESS NOTES
Physical Therapy                 Therapy Communication Note    Patient Name: Brianne Mallory  MRN: [id number]  Department: [facility] [place] [facility]  Room: [address] [place] [address]  Today's Date: 10/24/2024     Discipline: Physical Therapy    Missed Visit Reason: Missed Visit Reason: Patient placed on medical hold (Chart review performed, spoke wtih OT who had just worked with patient and pt passed out with OT with bradycardia to 34 and hypotensive. Team aware. Will hold PT at this time.)    Missed Time: Attempt    Janna Emerson, PT, DPT

## 2024-10-24 NOTE — CARE PLAN
Problem: Pain  Goal: Takes deep breaths with improved pain control throughout the shift  Outcome: Progressing  Goal: Turns in bed with improved pain control throughout the shift  Outcome: Progressing  Goal: Walks with improved pain control throughout the shift  Outcome: Progressing  Goal: Performs ADL's with improved pain control throughout shift  Outcome: Progressing  Goal: Participates in PT with improved pain control throughout the shift  Outcome: Progressing  Goal: Free from opioid side effects throughout the shift  Outcome: Progressing  Goal: Free from acute confusion related to pain meds throughout the shift  Outcome: Progressing     Problem: Pain - Adult  Goal: Verbalizes/displays adequate comfort level or baseline comfort level  Outcome: Progressing     Problem: Safety - Adult  Goal: Free from fall injury  Outcome: Progressing     Problem: Discharge Planning  Goal: Discharge to home or other facility with appropriate resources  Outcome: Progressing     Problem: Chronic Conditions and Co-morbidities  Goal: Patient's chronic conditions and co-morbidity symptoms are monitored and maintained or improved  Outcome: Progressing    The clinical goals for the shift include  remain HDS

## 2024-10-24 NOTE — PROGRESS NOTES
TCC met with patient at bedside (daughter at bedside) to discuss discharge planning. Pharmacy- [facility] in [place], DME- cane, walker, railings. Feels safe-yes, no social needs. TCC will continue to follow for discharge planning.     10/24/24 1410   Discharge Planning   Living Arrangements Alone   Support Systems Family members   Assistance Needed Refer to Chart   Type of Residence Private residence   Number of Stairs to Enter Residence 1   Number of Stairs Within Residence 10   Do you have animals or pets at home? No   Who is requesting discharge planning? Provider   Home or Post Acute Services None   Expected Discharge Disposition Home   Does the patient need discharge transport arranged? No   Financial Resource Strain   How hard is it for you to pay for the very basics like food, housing, medical care, and heating? Not hard   Housing Stability   In the last 12 months, was there a time when you were not able to pay the mortgage or rent on time? N   At any time in the past 12 months, were you homeless or living in a shelter (including now)? N   Transportation Needs   In the past 12 months, has lack of transportation kept you from medical appointments or from getting medications? no   In the past 12 months, has lack of transportation kept you from meetings, work, or from getting things needed for daily living? No   Patient Choice   Provider Choice list and CMS website (https://medicare.gov/care-compare#search) for post-acute Quality and Resource Measure Data were provided and reviewed with: Family;Patient   Patient / Family choosing to utilize agency / facility established prior to hospitalization No     Reviewed and approved by GREG SNOWDEN on 10/24/24 at 2:17 PM.  MAXIMILIANO Asher, RN  [facility]  Transitional Care Coordinator, Mon-Fri  Cell: [phone], Office: [phone]  Email: [email or web address]

## 2024-10-25 ENCOUNTER — APPOINTMENT (OUTPATIENT)
Dept: RADIOLOGY | Facility: HOSPITAL | Age: 79
End: 2024-10-25
Payer: MEDICARE

## 2024-10-25 ENCOUNTER — HOME HEALTH ADMISSION (OUTPATIENT)
Dept: HOME HEALTH SERVICES | Facility: HOME HEALTH | Age: 79
End: 2024-10-25
Payer: MEDICARE

## 2024-10-25 VITALS
RESPIRATION RATE: 17 BRPM | DIASTOLIC BLOOD PRESSURE: 61 MMHG | SYSTOLIC BLOOD PRESSURE: 97 MMHG | BODY MASS INDEX: 34.05 KG/M2 | HEART RATE: 72 BPM | TEMPERATURE: 97.3 F | WEIGHT: 224.7 LBS | OXYGEN SATURATION: 92 % | HEIGHT: 68 IN

## 2024-10-25 LAB
ANION GAP SERPL CALC-SCNC: 13 MMOL/L (ref 10–20)
BLOOD EXPIRATION DATE: NORMAL
BUN SERPL-MCNC: 11 MG/DL (ref 6–23)
CALCIUM SERPL-MCNC: 8.6 MG/DL (ref 8.6–10.6)
CHLORIDE SERPL-SCNC: 101 MMOL/L (ref 98–107)
CO2 SERPL-SCNC: 26 MMOL/L (ref 21–32)
CREAT SERPL-MCNC: 0.62 MG/DL (ref 0.5–1.05)
DISPENSE STATUS: NORMAL
EGFRCR SERPLBLD CKD-EPI 2021: 90 ML/MIN/1.73M*2
ERYTHROCYTE [DISTWIDTH] IN BLOOD BY AUTOMATED COUNT: 12.7 % (ref 11.5–14.5)
GLUCOSE SERPL-MCNC: 118 MG/DL (ref 74–99)
HCT VFR BLD AUTO: 30.3 % (ref 36–46)
HGB BLD-MCNC: 9.8 G/DL (ref 12–16)
MAGNESIUM SERPL-MCNC: 1.56 MG/DL (ref 1.6–2.4)
MCH RBC QN AUTO: 30.7 PG (ref 26–34)
MCHC RBC AUTO-ENTMCNC: 32.3 G/DL (ref 32–36)
MCV RBC AUTO: 95 FL (ref 80–100)
NRBC BLD-RTO: 0 /100 WBCS (ref 0–0)
PLATELET # BLD AUTO: 292 X10*3/UL (ref 150–450)
POTASSIUM SERPL-SCNC: 3.7 MMOL/L (ref 3.5–5.3)
PRODUCT BLOOD TYPE: 600
PRODUCT CODE: NORMAL
RBC # BLD AUTO: 3.19 X10*6/UL (ref 4–5.2)
SODIUM SERPL-SCNC: 136 MMOL/L (ref 136–145)
UNIT ABO: NORMAL
UNIT NUMBER: NORMAL
UNIT RH: NORMAL
UNIT VOLUME: 275
UNIT VOLUME: 301
UNIT VOLUME: 329
UNIT VOLUME: 350
WBC # BLD AUTO: 12.5 X10*3/UL (ref 4.4–11.3)
XM INTEP: NORMAL

## 2024-10-25 PROCEDURE — 2500000001 HC RX 250 WO HCPCS SELF ADMINISTERED DRUGS (ALT 637 FOR MEDICARE OP): Performed by: PHYSICIAN ASSISTANT

## 2024-10-25 PROCEDURE — 71045 X-RAY EXAM CHEST 1 VIEW: CPT

## 2024-10-25 PROCEDURE — 2500000002 HC RX 250 W HCPCS SELF ADMINISTERED DRUGS (ALT 637 FOR MEDICARE OP, ALT 636 FOR OP/ED): Performed by: PHYSICIAN ASSISTANT

## 2024-10-25 PROCEDURE — 99239 HOSP IP/OBS DSCHRG MGMT >30: CPT | Performed by: NURSE PRACTITIONER

## 2024-10-25 PROCEDURE — 80048 BASIC METABOLIC PNL TOTAL CA: CPT | Performed by: PHYSICIAN ASSISTANT

## 2024-10-25 PROCEDURE — 83735 ASSAY OF MAGNESIUM: CPT | Performed by: PHYSICIAN ASSISTANT

## 2024-10-25 PROCEDURE — 36415 COLL VENOUS BLD VENIPUNCTURE: CPT | Performed by: PHYSICIAN ASSISTANT

## 2024-10-25 PROCEDURE — 2500000001 HC RX 250 WO HCPCS SELF ADMINISTERED DRUGS (ALT 637 FOR MEDICARE OP): Performed by: NURSE PRACTITIONER

## 2024-10-25 PROCEDURE — 97161 PT EVAL LOW COMPLEX 20 MIN: CPT | Mod: GP

## 2024-10-25 PROCEDURE — 71045 X-RAY EXAM CHEST 1 VIEW: CPT | Performed by: RADIOLOGY

## 2024-10-25 PROCEDURE — 2500000004 HC RX 250 GENERAL PHARMACY W/ HCPCS (ALT 636 FOR OP/ED): Performed by: NURSE PRACTITIONER

## 2024-10-25 PROCEDURE — 85027 COMPLETE CBC AUTOMATED: CPT | Performed by: PHYSICIAN ASSISTANT

## 2024-10-25 RX ORDER — LANOLIN ALCOHOL/MO/W.PET/CERES
400 CREAM (GRAM) TOPICAL ONCE
Status: COMPLETED | OUTPATIENT
Start: 2024-10-25 | End: 2024-10-25

## 2024-10-25 RX ORDER — LIDOCAINE 560 MG/1
2 PATCH PERCUTANEOUS; TOPICAL; TRANSDERMAL DAILY
Start: 2024-10-26

## 2024-10-25 RX ORDER — ACETAMINOPHEN 325 MG/1
650 TABLET ORAL EVERY 4 HOURS
Start: 2024-10-25

## 2024-10-25 RX ORDER — TRAMADOL HYDROCHLORIDE 50 MG/1
50 TABLET ORAL EVERY 6 HOURS PRN
Qty: 15 TABLET | Refills: 0 | Status: SHIPPED | OUTPATIENT
Start: 2024-10-25

## 2024-10-25 RX ORDER — ACETAMINOPHEN 325 MG/1
650 TABLET ORAL EVERY 4 HOURS
Status: DISCONTINUED | OUTPATIENT
Start: 2024-10-25 | End: 2024-10-25 | Stop reason: HOSPADM

## 2024-10-25 RX ORDER — POTASSIUM CHLORIDE 1.5 G/1.58G
20 POWDER, FOR SOLUTION ORAL ONCE
Status: COMPLETED | OUTPATIENT
Start: 2024-10-25 | End: 2024-10-25

## 2024-10-25 RX ORDER — LIDOCAINE 560 MG/1
2 PATCH PERCUTANEOUS; TOPICAL; TRANSDERMAL DAILY
Status: DISCONTINUED | OUTPATIENT
Start: 2024-10-25 | End: 2024-10-25 | Stop reason: HOSPADM

## 2024-10-25 ASSESSMENT — COGNITIVE AND FUNCTIONAL STATUS - GENERAL
MOVING TO AND FROM BED TO CHAIR: A LITTLE
TURNING FROM BACK TO SIDE WHILE IN FLAT BAD: A LITTLE
CLIMB 3 TO 5 STEPS WITH RAILING: A LITTLE
WALKING IN HOSPITAL ROOM: A LITTLE
STANDING UP FROM CHAIR USING ARMS: A LITTLE
MOVING FROM LYING ON BACK TO SITTING ON SIDE OF FLAT BED WITH BEDRAILS: A LITTLE
MOBILITY SCORE: 18

## 2024-10-25 ASSESSMENT — PAIN SCALES - GENERAL
PAINLEVEL_OUTOF10: 0 - NO PAIN
PAINLEVEL_OUTOF10: 0 - NO PAIN
PAINLEVEL_OUTOF10: 5 - MODERATE PAIN

## 2024-10-25 ASSESSMENT — ACTIVITIES OF DAILY LIVING (ADL): ADL_ASSISTANCE: INDEPENDENT

## 2024-10-25 ASSESSMENT — PAIN - FUNCTIONAL ASSESSMENT: PAIN_FUNCTIONAL_ASSESSMENT: 0-10

## 2024-10-25 NOTE — PROGRESS NOTES
10/25/24 1438   Discharge Planning   Expected Discharge Disposition Home   ([facility])     Spoke to patient and her daughter about therapy recommendations for low intensity/ home care. They prefer to use [facility].  Home Care was notified of plan for patient to discharge to home today.

## 2024-10-25 NOTE — DISCHARGE SUMMARY
Discharge Diagnosis  Mass of upper lobe of right lung    Issues Requiring Follow-Up  Final Pathology    Test Results Pending At Discharge  Pending Labs       Order Current Status    Surgical Pathology Exam In process          Hospital Course  Ms. Brianne Mallory is an 79 year old female who presents with a PET Avid RUL mass who underwent biopsy which was non-diagnostic.  She is now POD#2 s/p a robotic right upper lobectomy.      Intra-op Findings: significant scarring of hilar structures, adherence of lesion along proximal bronchus, azygous vein, and between the vascular structures. Preliminary pathology: squamous cell carcinoma.    Satisfactory post op course. POD #1- bradycardic and near syncopal (guarded) episode while working with PT.  Resolved without immediate intervention, +500cc IVF and discontinuation of ppx beta blocker without further sequale.  Clamp trial initiated on pod #2 during AM rounds.     The patient has had an uncomplicated surgical course.  her chest tubes were removed on post operative day 2 and post pull imaging was without evidence of clinically significant  pneumothorax.     At the time of discharge, her pain was controlled on an oral pain regimen, She was breathing comfortably on room air.  Shewas ambulating independently.  She was tolerating a regular diet without nausea. She was voiding regularly.      The patient was educated on post operative activity restrictions, dietary guidelines, and pain management. She will follow up with Dr. Pacheco in the outpatient setting in two weeks with a CXR just prior to this visit. The patient has been instructed to add an OTC stool softeners or laxative while taking oral analgesia.  Deep breathing, coughing, and walking at home encouraged. All questions have been answered and concerns addressed until there were none.     Pertinent Physical Exam At Time of Discharge  General: She is a pleasant female currently in no distress.  Visit Vitals  /73  "(BP Location: Right arm, Patient Position: Lying)   Pulse 82   Temp 36.3 °C (97.3 °F) (Temporal)   Resp 16   Ht 1.727 m (5' 8\")   Wt 102 kg (224 lb 11.2 oz)   SpO2 92%   BMI 34.17 kg/m²   Smoking Status Former   BSA 2.21 m²      Body mass index is 34.17 kg/m².   HEENT: Normocephalic and atraumatic.   NECK: Supple. Trach midline. No JVD.   CHEST: Breathing comfortably on RA. Chest tube to WS with 540ml drainage since in past 24 hours, ?one bubble with initial deep breathing. Clamped on Am rounds.   HEART: Regular rate and rhythm. NSR with rate in 60s per tele review.   ABDOMEN: Soft, ND, nontender, +BS  NEUROLOGIC: Alert and oriented. Grossly intact.   EXTREMITIES: Moves all extremities equally. Bilat knee tenderness - chronic/stable, No calf tenderness.    Home Medications     Medication List      START taking these medications     acetaminophen 325 mg tablet; Commonly known as: Tylenol; Take 2 tablets   (650 mg) by mouth every 4 hours.   lidocaine 4 % patch; Place 2 patches over 12 hours on the skin once   daily. Remove & discard patch within 12 hours or as directed by MD.; Start   taking on: October 26, 2024   traMADol 50 mg tablet; Commonly known as: Ultram; Take 1 tablet (50 mg)   by mouth every 6 hours if needed (pain).     CONTINUE taking these medications     levothyroxine 88 mcg tablet; Commonly known as: Synthroid, Levoxyl   pantoprazole 40 mg EC tablet; Commonly known as: ProtoNix; Take 1 tablet   (40 mg) by mouth once daily in the morning. Take before meals. Do not   crush, chew, or split.   sertraline 25 mg tablet; Commonly known as: Zoloft   simvastatin 40 mg tablet; Commonly known as: Zocor     STOP taking these medications     cholecalciferol 125 mcg (5000 UT) capsule; Commonly known as: Vitamin   D-3   cyanocobalamin 1,000 mcg tablet; Commonly known as: Vitamin B-12       Outpatient Follow-Up  Future Appointments   Date Time Provider Department Center   11/7/2024  2:45 PM Gissell Pacheco DO " [id number]       Time spent with discharge was >30 minutes and included explanation of discharge instructions, arranging homegoing prescriptions, supplies and follow-up, and creating the discharge summary of the patient's hospialization.    Shy Angel, APRN-CNP

## 2024-10-25 NOTE — PROGRESS NOTES
Physical Therapy    Physical Therapy Evaluation    Patient Name: Brianne Mallory  MRN: [id number]  Department: [facility] [id number]  Room: [address] [place] [address]  Today's Date: 10/25/2024   Time Calculation  Start Time: 1256  Stop Time: 1308  Time Calculation (min): 12 min    Assessment/Plan   PT Assessment  PT Assessment Results: Decreased strength, Decreased endurance, Impaired balance, Decreased mobility  Rehab Prognosis: Good  Barriers to Discharge: none  Evaluation/Treatment Tolerance: Patient tolerated treatment well  Medical Staff Made Aware: Yes  Strengths: Ability to acquire knowledge, Access to adaptive/assistive products, Insight into problems, Premorbid level of function, Support of extended family/friends  Barriers to Participation: Housing layout  End of Session Communication: Bedside nurse  Assessment Comment: Pt. is a 79 yof that presents with impairments including decreased functional strength, decreased activity tolerance/endurance, minorly impaired balance, and increased difficulty with functional mobility compared to baseline level of function. Pt. will benefit from skilled PT intervention while inpatient to address the above deficits and maximize return to PLOF. Anticipate pt will benefit from low intensity PT with family support upon discharge as pt lives alone.  End of Session Patient Position: Bed, 3 rail up, Alarm off, not on at start of session    IP OR SWING BED PT PLAN  Inpatient or Swing Bed: Inpatient  PT Plan  Treatment/Interventions: Bed mobility, Transfer training, Gait training, Stair training, Balance training, Strengthening, Endurance training, Therapeutic exercise, Range of motion, Therapeutic activity, Home exercise program  PT Plan: Ongoing PT  PT Frequency: 3 times per week  PT Discharge Recommendations: Low intensity level of continued care  Equipment Recommended upon Discharge:  (Pt owns FWW)  PT Recommended Transfer Status: Contact guard, Assistive device  PT - OK to Discharge: Yes    Subjective    General Visit Information:  General  Reason for Referral: s/p robotic right upper lobectomy on 10/23  Past Medical History Relevant to Rehab: Mass of upper lobe of right lung, HTN, HLD  Family/Caregiver Present: No  Prior to Session Communication: Bedside nurse  Patient Position Received:  (Enounctered pt seated in hallway with RN)  Preferred Learning Style: verbal  General Comment: Pt received seated in hallway with RN, pleasant and agreeable to participate in session    Home Living:  Home Living  Type of Home: House  Lives With: Alone (Reports has good family support, daughter able to stay with her upon discharge)  Home Adaptive Equipment: Walker rolling or standard  Home Layout: Multi-level, Stairs to alternate level with rails (Split level home)  Alternate Level Stairs-Number of Steps: 5 steps up with HR to bedroom/bathroom, kitchen on main level, 4 steps down with HR to access to walk in shower - pt reports plan to stay downstairs in lift chair  Home Access: Stairs to enter without rails  Entrance Stairs-Rails: None  Entrance Stairs-Number of Steps: 1  Bathroom Shower/Tub: Tub/shower unit, Walk-in shower (tub/shower up stairs, WIS downstairs)  Bathroom Equipment: Shower chair with back    Prior Level of Function:  Prior Function Per Pt/Caregiver Report  Level of [place]: Independent with ADLs and functional transfers, Independent with homemaking with ambulation  Receives Help From: Family  ADL Assistance: Independent  Homemaking Assistance: Independent  Ambulatory Assistance: Independent (Reports IND at baseline without AD, denied falls)  Leisure: Spending time with family  Prior Function Comments: +drives    Precautions:  Precautions  Hearing/Visual Limitations: WFL  Medical Precautions: Fall precautions     Vital Signs (Past 2hrs)        Date/Time Vitals Session Patient Position Pulse Resp SpO2 BP MAP (mmHg)    10/25/24 1256 Pre PT  --  84  --  --  --  --                   Vital Signs Comment: Pt  appeared SOB following ambulation, seated rest break provided and monitored vitals- , SpO2 92% on room air. RN aware. Post PT: HR 86 bpm     Objective   Pain:  Pain Assessment  Pain Assessment: 0-10  0-10 (Numeric) Pain Score: 0 - No pain    Cognition:  Cognition  Overall Cognitive Status: Within Functional Limits  Orientation Level: Oriented X4    General Assessments:  Activity Tolerance  Endurance: Tolerates 10 - 20 min exercise with multiple rests  Early Mobility/Exercise Safety Screen: Proceed with mobilization - No exclusion criteria met  Activity Tolerance Comments: Pt tolerated ambulating in hallways, appears SOB upon completion    Sensation  Light Touch: No apparent deficits    Postural Control  Postural Control: Within Functional Limits    Static Sitting Balance  Static Sitting-Level of Assistance: Close supervision  Dynamic Sitting Balance  Dynamic Sitting-Level of Assistance: Close supervision    Static Standing Balance  Static Standing-Level of Assistance: Close supervision  Dynamic Standing Balance  Dynamic Standing-Level of Assistance: Contact guard    Functional Assessments:  Bed Mobility  Bed Mobility: Yes  Bed Mobility 1  Bed Mobility 1: Sitting to supine  Level of Assistance 1: Close supervision  Bed Mobility Comments 1: HOB elevated, increased time to complete    Transfers  Transfer: Yes  Transfer 1  Transfer From 1: Sit to, Stand to  Transfer to 1: Stand, Sit  Technique 1: Sit to stand, Stand to sit  Transfer Device 1: Walker  Transfer Level of Assistance 1: Close supervision  Trials/Comments 1: Cues for proper UE placement and technique    Ambulation/Gait Training  Ambulation/Gait Training Performed: Yes  Ambulation/Gait Training 1  Surface 1: Level tile  Device 1: Rolling walker  Assistance 1: Contact guard  Quality of Gait 1: Decreased step length, Wide base of support, Forward flexed posture (reggie LEs appear externally rotated, slow shankar)  Comments/Distance (ft) 1: 150  ft    Stairs  Stairs: No (Pt reports plan to d/c home today, however pt declined to perform stair training despite encouragement from therapist. Pt reporting she does not anticipate any difficulty with stair negotiation)    Extremity/Trunk Assessments:  RLE   RLE : Within Functional Limits  LLE   LLE : Within Functional Limits    Outcome Measures:  [facility] Basic Mobility  Turning from your back to your side while in a flat bed without using bedrails: A little  Moving from lying on your back to sitting on the side of a flat bed without using bedrails: A little  Moving to and from bed to chair (including a wheelchair): A little  Standing up from a chair using your arms (e.g. wheelchair or bedside chair): A little  To walk in hospital room: A little  Climbing 3-5 steps with railing: A little  Basic Mobility - Total Score: 18    Encounter Problems       Encounter Problems (Active)       Balance       Patient to demonstrate Aliza static and dynamic standing balance without LOB with change of directions, no hesitancy, appropriate TUCKER and sequencing to complete functional task.        Start:  10/25/24    Expected End:  11/08/24               Mobility       STG - Patient will ambulate >/= 250 ft Aliza with LRAD and no acute LOB       Start:  10/25/24    Expected End:  11/08/24            Patient to ascend/descend >/= 5 stairs with HR and SBA to demo ability to safely traverse ELISSA and within home       Start:  10/25/24    Expected End:  11/08/24            Patient will tolerate >/= 30 minutes continuous activity with stable vital signs, RPE </=13/20, RPD </=3/10        Start:  10/25/24    Expected End:  11/08/24               PT Transfers       STG - Patient will perform bed mobility Aliza       Start:  10/25/24    Expected End:  11/08/24            STG - Patient will transfer sit to and from stand Aliza with LRAD        Start:  10/25/24    Expected End:  11/08/24               Pain - Adult              Education  Documentation  Body Mechanics, taught by Jamia Duenas, PT at 10/25/2024  2:18 PM.  Learner: Patient  Readiness: Acceptance  Method: Explanation, Demonstration  Response: Verbalizes Understanding  Comment: transfer techniques, activity pacing, pursed lip breathing    Home Exercise Program, taught by Jamia Duenas, PT at 10/25/2024  2:18 PM.  Learner: Patient  Readiness: Acceptance  Method: Explanation, Demonstration  Response: Verbalizes Understanding  Comment: transfer techniques, activity pacing, pursed lip breathing    Mobility Training, taught by Jamia Duenas, PT at 10/25/2024  2:18 PM.  Learner: Patient  Readiness: Acceptance  Method: Explanation, Demonstration  Response: Verbalizes Understanding  Comment: transfer techniques, activity pacing, pursed lip breathing    Education Comments  No comments found.          10/25/24 at 2:20 PM - Jamia Duenas, PT

## 2024-10-25 NOTE — PROGRESS NOTES
"Thoracic Surgery Progress Note  10/25/2024    Brianne Mallory is a 79 y.o. female who presents with a PET Avid RUL mass who underwent biopsy which was non-diagnostic.  She is now POD#2 s/p a robotic right upper lobectomy.      Intra-op Findings: significant scarring of hilar structures, adherence of lesion along proximal bronchus, azygous vein, and between the vascular structures  Preliminary pathology: squamous cell carcinoma.    Overnight issues: yesterday, bradycardic and near syncopal (guarded) episode while working with PT.  Resolved without immediate intervention, +500cc IVF and discontinuation of ppx beta blocker without further sequale.  This am, resting comfortably in bed on RA.  Ct with questionable air leak, clamped on AM rounds.     Physical Exam:  General: She is a pleasant female currently in no distress.  Visit Vitals  /73 (BP Location: Right arm, Patient Position: Lying)   Pulse 82   Temp 36.3 °C (97.3 °F) (Temporal)   Resp 16   Ht 1.727 m (5' 8\")   Wt 102 kg (224 lb 11.2 oz)   SpO2 92%   BMI 34.17 kg/m²   Smoking Status Former   BSA 2.21 m²     Body mass index is 34.17 kg/m².   HEENT: Normocephalic and atraumatic.   NECK: Supple. Trach midline. No JVD.   CHEST: Breathing comfortably on RA. Chest tube to WS with 540ml drainage since in past 24 hours, ?one bubble with initial deep breathing. Clamped on Am rounds.   HEART: Regular rate and rhythm. NSR with rate in 60s per tele review.   ABDOMEN: Soft, ND, nontender, +BS  NEUROLOGIC: Alert and oriented. Grossly intact.   EXTREMITIES: Moves all extremities equally. Bilat knee tenderness - chronic/stable, No calf tenderness.     Diagnostics:     Intake/Output Summary (Last 24 hours) at 10/25/2024 0812  Last data filed at 10/25/2024 0616  Gross per 24 hour   Intake 560 ml   Output 2770 ml   Net -2210 ml     Results from last 7 days   Lab Units 10/25/24  0659 10/24/24  0804   WBC AUTO x10*3/uL 12.5* 11.6*   HEMOGLOBIN g/dL 9.8* 10.5*   HEMATOCRIT % " 30.3* 34.2*   PLATELETS AUTO x10*3/uL 292 347     Results from last 7 days   Lab Units 10/24/24  0804   SODIUM mmol/L 137   POTASSIUM mmol/L 4.1   CHLORIDE mmol/L 101   CO2 mmol/L 28   BUN mg/dL 12   CREATININE mg/dL 0.68   GLUCOSE mg/dL 109*   CALCIUM mg/dL 9.0     Results from last 7 days   Lab Units 10/24/24  0804   SODIUM mmol/L 137   POTASSIUM mmol/L 4.1   CHLORIDE mmol/L 101   CO2 mmol/L 28   BUN mg/dL 12   CREATININE mg/dL 0.68   GLUCOSE mg/dL 109*   CALCIUM mg/dL 9.0     Scheduled medications  ceFAZolin, , ,   ceFAZolin, , ,   esmolol, , ,   fentaNYL PF, , ,   glycopyrrolate, , ,   [Held by provider] heparin (porcine), 5,000 Units, subcutaneous, q8h  levothyroxine, 88 mcg, oral, Daily  lidocaine (cardiac), , ,   lidocaine, 0.1 mL, subcutaneous, Once  [Held by provider] metoprolol tartrate, 12.5 mg, oral, q8h ZEINA  pantoprazole, 40 mg, oral, Daily before breakfast  rocuronium, , ,   sennosides-docusate sodium, 2 tablet, oral, BID  sertraline, 25 mg, oral, Daily  simvastatin, 40 mg, oral, Nightly      Continuous medications       PRN medications  PRN medications: acetaminophen, ceFAZolin, ceFAZolin, esmolol, fentaNYL PF, glycopyrrolate, ipratropium-albuteroL, lidocaine (cardiac), naloxone, ondansetron, ondansetron, oxygen, rocuronium, traMADol    Imaging:   AM CXR reviewed. Expected post op changes and chest tube placement. No clinically significant pneumothorax.  Remains stable when compared with previous exam. No formal report at this time.     Assessment:  Brianne Mallory is a 79 y.o. female now POD#2 s/p a robotic right upper lobectomy.  Satisfactory post op course. Clamp trail initiated on AM rounds.     Plan:  Neurology: post operative pain  -Continue PO analgesics  - add lidocaine patches  -Bowel regimen available for constipation secondary to pain medication   -Out of bed to the chair throughout the day  -Encourage ambulation as tolerated    Cardiovascular: h/o HTN, HLD  -Continue telemetry  -Vital signs  every four hours  -Beta blocker discontinued due to bradycardia/syncope on pod #1  -Continue home regimen of simvastatin  -Replace electrolytes as needed for K >4, Mg >2    Pulmonology: post op atelectasis, chest tube management,   -Encourage incentive spirometer use every hour  -Continue pulmonary hygiene  -Wean oxygen as tolerated   -clamp trial initiated this AM, CXR in 4 hours.   -Obtain daily CXR  -Monitor and record chest tube drainage every shift    Gastrointestinal:   -regular diet as tolerated  -Zofran available for nausea  -scheduled colace, PRN bowel regimen  -resumed home regimen of Protonix    Genitourinary:   -Removed hernandez 10/24, voiding   -Continue to monitor daily electrolytes with routine BMPs   -monitor I's and O's closely    Infectious Disease: afebrile; slight leukocytosis - likely post-op related  -Continue to trend daily temperatures and WBC count to monitor for signs of post-operative infection   -Monitor surgical incisions for signs of infection   -Perioperative antibiotics completed     Hematology/DVT Prophylaxis:   -Continue SCDs, early ambulation  - subcutaneous heparin held post operatively, will resume this AM.   -Monitor for signs of acute blood loss  -Trend CBCs     Psych: h/o Anxiety  -cont Zoloft  -consult Psych PRN    Disposition:  -Plan for discharge to home once stable from a surgical standpoint.  -Continue to assess for home-going needs     Patient independently seen and examined by this provider, seen on AM rounds with Dr. Tara Angel, APRN-CNP  Thoracic Surgery Pager [phone]

## 2024-10-25 NOTE — CARE PLAN
The patient's goals for the shift include      The clinical goals for the shift include Patient will remain HDS this shift      Problem: Pain  Goal: Takes deep breaths with improved pain control throughout the shift  Outcome: Progressing  Goal: Turns in bed with improved pain control throughout the shift  Outcome: Progressing  Goal: Walks with improved pain control throughout the shift  Outcome: Progressing  Goal: Performs ADL's with improved pain control throughout shift  Outcome: Progressing  Goal: Participates in PT with improved pain control throughout the shift  Outcome: Progressing  Goal: Free from opioid side effects throughout the shift  Outcome: Progressing  Goal: Free from acute confusion related to pain meds throughout the shift  Outcome: Progressing     Problem: Pain - Adult  Goal: Verbalizes/displays adequate comfort level or baseline comfort level  Outcome: Progressing     Problem: Safety - Adult  Goal: Free from fall injury  Outcome: Progressing     Problem: Discharge Planning  Goal: Discharge to home or other facility with appropriate resources  Outcome: Progressing     Problem: Chronic Conditions and Co-morbidities  Goal: Patient's chronic conditions and co-morbidity symptoms are monitored and maintained or improved  Outcome: Progressing

## 2024-10-25 NOTE — HOSPITAL COURSE
Ms. Brianne Mallory is an 79 year old female who presents with a PET Avid RUL mass who underwent biopsy which was non-diagnostic.  She is now POD#2 s/p a robotic right upper lobectomy.      Intra-op Findings: significant scarring of hilar structures, adherence of lesion along proximal bronchus, azygous vein, and between the vascular structures. Preliminary pathology: squamous cell carcinoma.    Satisfactory post op course. POD #1- bradycardic and near syncopal (guarded) episode while working with PT.  Resolved without immediate intervention, +500cc IVF and discontinuation of ppx beta blocker without further sequale.  Clamp trial initiated on pod #2 during AM rounds.

## 2024-10-29 ENCOUNTER — HOME CARE VISIT (OUTPATIENT)
Dept: HOME HEALTH SERVICES | Facility: HOME HEALTH | Age: 79
End: 2024-10-29

## 2024-10-29 ASSESSMENT — ENCOUNTER SYMPTOMS
PERSON REPORTING PAIN: PATIENT
DENIES PAIN: 1

## 2024-11-07 ENCOUNTER — HOSPITAL ENCOUNTER (OUTPATIENT)
Dept: RADIOLOGY | Facility: HOSPITAL | Age: 79
Discharge: HOME | End: 2024-11-07
Payer: MEDICARE

## 2024-11-07 ENCOUNTER — OFFICE VISIT (OUTPATIENT)
Dept: SURGERY | Facility: HOSPITAL | Age: 79
End: 2024-11-07
Payer: MEDICARE

## 2024-11-07 VITALS
RESPIRATION RATE: 18 BRPM | HEART RATE: 73 BPM | OXYGEN SATURATION: 96 % | DIASTOLIC BLOOD PRESSURE: 66 MMHG | TEMPERATURE: 98.8 F | BODY MASS INDEX: 32.86 KG/M2 | SYSTOLIC BLOOD PRESSURE: 129 MMHG | WEIGHT: 216.1 LBS

## 2024-11-07 DIAGNOSIS — C34.12 MALIGNANT NEOPLASM OF UPPER LOBE OF LEFT LUNG (MULTI): ICD-10-CM

## 2024-11-07 DIAGNOSIS — C34.90 MALIGNANT NEOPLASM OF LUNG, UNSPECIFIED LATERALITY, UNSPECIFIED PART OF LUNG (MULTI): ICD-10-CM

## 2024-11-07 PROCEDURE — 99211 OFF/OP EST MAY X REQ PHY/QHP: CPT | Mod: 24 | Performed by: STUDENT IN AN ORGANIZED HEALTH CARE EDUCATION/TRAINING PROGRAM

## 2024-11-07 PROCEDURE — 1159F MED LIST DOCD IN RCRD: CPT | Performed by: STUDENT IN AN ORGANIZED HEALTH CARE EDUCATION/TRAINING PROGRAM

## 2024-11-07 PROCEDURE — 1126F AMNT PAIN NOTED NONE PRSNT: CPT | Performed by: STUDENT IN AN ORGANIZED HEALTH CARE EDUCATION/TRAINING PROGRAM

## 2024-11-07 PROCEDURE — 3078F DIAST BP <80 MM HG: CPT | Performed by: STUDENT IN AN ORGANIZED HEALTH CARE EDUCATION/TRAINING PROGRAM

## 2024-11-07 PROCEDURE — 3074F SYST BP LT 130 MM HG: CPT | Performed by: STUDENT IN AN ORGANIZED HEALTH CARE EDUCATION/TRAINING PROGRAM

## 2024-11-07 PROCEDURE — 1111F DSCHRG MED/CURRENT MED MERGE: CPT | Performed by: STUDENT IN AN ORGANIZED HEALTH CARE EDUCATION/TRAINING PROGRAM

## 2024-11-07 PROCEDURE — 71046 X-RAY EXAM CHEST 2 VIEWS: CPT

## 2024-11-07 ASSESSMENT — PAIN SCALES - GENERAL: PAINLEVEL_OUTOF10: 0-NO PAIN

## 2024-11-08 LAB
ELECTRONICALLY SIGNED BY: NORMAL
FOCUSED SOLID TUMOR DNA/RNA RESULTS: NORMAL

## 2024-11-11 LAB
LAB AP ASR DISCLAIMER: NORMAL
LAB AP BLOCK FOR ADDITIONAL STUDIES: NORMAL
LABORATORY COMMENT REPORT: NORMAL
Lab: NORMAL
PATH REPORT.FINAL DX SPEC: NORMAL
PATH REPORT.GROSS SPEC: NORMAL
PATH REPORT.RELEVANT HX SPEC: NORMAL
PATH REPORT.TOTAL CANCER: NORMAL
PATHOLOGY SYNOPTIC REPORT: NORMAL
RESIDENT REVIEW: NORMAL

## 2024-11-12 ENCOUNTER — TELEPHONE (OUTPATIENT)
Dept: CARDIOTHORACIC SURGERY | Facility: HOSPITAL | Age: 79
End: 2024-11-12
Payer: MEDICARE

## 2024-11-12 DIAGNOSIS — C34.01 SQUAMOUS CELL CARCINOMA OF HILUM OF RIGHT LUNG (MULTI): Primary | ICD-10-CM

## 2024-11-12 NOTE — TELEPHONE ENCOUNTER
Result Communication    Resulted Orders   Surgical Pathology Exam   Result Value Ref Range    Case Report       Surgical Pathology                                Case: [id number]                                  Authorizing Provider:  Gissell Pacheco DO     Collected:           10/23/2024 1302              Ordering Location:     [facility]       Received:            10/23/2024 1620                                     [facility] OR                                                             Pathologist:           Clarisa Higgins DO                                                          Intraop:               Eva Ayers MD PhD                                                            Specimens:   A) - LYMPH NODE PULMONARY (SPECIFY SITE), LEVEL 9 LYMPH NODE                                        B) - LYMPH NODE PULMONARY (SPECIFY SITE), LEVEL 8 LYMPH NODE                                        C) - LYMPH NODE PULMONARY (SPECIFY SITE), LEVEL 7 LYMPH NODE                                        D) - LYMPH NODE PULMONARY (SPECIFY SITE), LEVEL 11 LN                                                E) - LYMPH NODE PULMONARY (SPECIFY SITE), LEVEL 12 LN                                               F) - LUNG LOBECTOMY/SEGMENTECTOMY RIGHT (SPECIFY SITE), RIGHT UPPER LOBE                            G) - LYMPH NODE PULMONARY (SPECIFY SITE), LEVEL 10 LN                                               H) - LYMPH NODE PULMONARY (SPECIFY SITE), LEVEL 4 LN                                       FINAL DIAGNOSIS       A. LYMPH NODE, LEVEL 9, EXCISION:  - One lymph node with no evidence of carcinoma (0/1).    B. LYMPH NODE, LEVEL 8, EXCISION:  - One lymph node with no evidence of carcinoma (0/1).    C. LYMPH NODE. LEVEL 7, EXCISION:  - Fragments of lymph node with no evidence of carcinoma (0/1).    D. LYMPH NODE, LEVEL 11, EXCISION:  - Fragments of lymph node with no evidence of carcinoma (0/1).    E. LYMPH NODE, LEVEL  12, EXCISION:  - One lymph node with no evidence of carcinoma (0/1).    F. 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 See cancer summary report and note.   - Margins of resection are negative for carcinoma, see note.   - Two lymph nodes with no evidence of carcinoma (0/2).  - Background lung with post-obstructive changes, atypical adenomatous hyperplasia, and emphysematous changes.    Note: In the bronchial en face margin, the bronchial lumen is negative for carcinoma, however squamous cell carcinoma is seen involving the soft tissue around the bronchial wall, and shows cautery artifact.     AFB and GMS are performed on an area of post-obstructive changes with extensive inflammation, and the stains are negative for acid-fast and fungal organisms, respectively.     G. LYMPH NODE, LEVEL 10, EXCISION:  - Fragments of lymph node with no evidence of carcinoma (0/1).  - Detached fragments of carcinoma are seen, admixed with blood, and do not involve the lymphoid tissue.     H. LYMPH NODE, LEVEL 4, EXCISION:  - Fragments of lymph node with no evidence of carcinoma (0/1).                By the signature on this report, the individual or group listed as making the Final Interpretation/Diagnosis certifies that they have reviewed this case.       Resident Review       Alicia Vanegas MD        Case Summary Report       LUNG   LUNG - All Specimens   8th Edition - Protocol posted: 9/21/2022      SPECIMEN      Procedure:    Lobectomy       Specimen Laterality:    Right       TUMOR      Tumor Focality:    Single focus       Tumor Site:    Upper lobe of lung       Tumor Size:            Total Tumor Size (size of entire tumor):    Greatest Dimension (Centimeters): 5.5 cm      Histologic Type:    Invasive squamous cell carcinoma, keratinizing       Histologic Grade:    G3, poorly differentiated       Visceral Pleura Invasion:    Not identified        Direct Invasion of Adjacent Structures:    Not applicable (no adjacent structures present)       Treatment Effect:    No known presurgical therapy       Lymphovascular Invasion:    Not identified       MARGINS      Margin Status for Invasive Carcinoma:    All margins negative for invasive carcinoma         Closest Margin(s) to Invasive Carcinoma:    Bronchial         Closest Margin(s) to Invasive Carcinoma:    Parenchymal         Distance from Invasive Carcinoma to Closest Margin:    0.2 cm      Margin Status for Non-Invasive Tumor:    Not applicable       REGIONAL LYMPH NODES      Lymph Node(s) from Prior Procedures:    Included         Prior Lymph Node Procedure(s) Included:    [id number]       Regional Lymph Node Status:            :    All regional lymph nodes negative for tumor         Number of Lymph Nodes Examined:    At least: 13           John Site(s) Examined:    4R: Lower paratracheal           John Site(s) Examined:    8R: Para-esophageal (below jose miguel)           John Site(s) Examined:    9R: Pulmonary ligament           John Site(s) Examined:    10R: Hilar           John Site(s) Examined:    11R: Interlobar           Jonh Site(s) Examined:    12R: Lobar           John Site(s) Examined:    7: Subcarinal           John Site(s) Examined:    4L: Lower paratracheal       PATHOLOGIC STAGE CLASSIFICATION (pTNM, AJCC 8th Edition)      Reporting of pT, pN, and (when applicable) pM categories is based on information available to the pathologist at the time the report is issued. As per the AJCC (Chapter 1, 8th Ed.) it is the managing physician’s responsibility to establish the final pathologic stage based upon all pertinent information, including but potentially not limited to this pathology report.      pT Category:    pT3       pN Category:    pN0       ADDITIONAL FINDINGS      Additional Findings:    Emphysema       Block for Additional Biomarkers/Molecular Studies       Normal Block: F15  Tumor Block:  "F14      Clinical History       Pre-op diagnosis:  Mass of upper lobe of right lung [R91.8]      Gross Description       A.  Received in formalin, labeled with the patient's name and hospital number and \"level 9 lymph node\", is a possible lymph node measuring 1.7 x 0.9 x 0.4 cm.  The specimen is entirely submitted in 1 cassette.  PLP     B. Received in formalin, labeled with the patient's name and hospital number and \"level 8 lymph node\", is a possible lymph node measuring 1.0 x 0.6 x 0.4 cm.  The specimen is entirely submitted in 1 cassette.  PLP     C. Received in formalin, labeled with the patient's name and hospital number and \"level 7 lymph node\", are multiple fragments of possible lymph node aggregating to 1.6 x 1.3 x 0.5 cm.  The specimen is entirely submitted in 1 cassette.  PLP      D. Received in formalin, labeled with the patient's name and hospital number and \"level 11 lymph node\", are multiple fragments of possible lymph node aggregating to 1.0 x 0.7 x 0.3 cm.  The specimen is entirely submitted in 1 cassette.  PLP       E. Received in formalin, labeled with the patient's name and hospital number and \"level 12 lymph node\", is a possible lymph node measuring 1.2 x 0.8 x 0.4 cm.  The specimen is entirely submitted in 1 cassette.  PLP      F. Received fresh for intraoperative consultation, labeled with the patient´s name and hospital number and \"RIGHT UPPER LOBE\", is a lung segment that measures 16.9 x 13.5 x 3.5 cm, and weighs 259 g. The pleura is focally disrupted and puckered overlying a palpable firm mass.The pleural surface overlying the mass and around the disrupted area is inked yellow. The remainder of the pleura is mottled heavily with black streaks, with multiple palpable subpleural nodules. The bronchial and vascular resection margins are identified. The bronchus measures 1.6 cm in length, and 1.0 cm in diameter.  The possible artery measures 2.0 cm in length, and 2.0 cm in diameter.  Possible " vein measures 2.3 cm in length, and 1.9 cm in diameter.  The stapled parenchymal margin measures 22.2 cm in length.  The staple line is shaved and the parenchymal margin is inked blue.  The bronchial and vascular margins are shaved.  The bronchus is opened and is remarkable for an occluding infiltrating mass narrowing the lumen to up to 85%.  The specimen is serially sectioned to reveal an irregular tan-pink  firm mass in the area of disruption described previously, measuring 5.5 x 5.2 x 2.4 cm, The mass is 0.2 from the parenchymal margin, abutting the bronchial margin, and 0.5 cm from the vascular margin. Multiple tan-yellow firm nodules ranging from 0.2 to 0.4 cm in greatest dimension are present in the lung parenchyma, the closest to the parenchymal margin is located 4.5 cm from said margin.  Multiple tan-brown lymph nodes are identified, ranging from 0.4-1.0, the cut surface of the lymph nodes are dark brown and soft. The remainder of the lung parenchyma reveals a red-brown cut surface  A portion of the uninvolved parenchyma has been submitted for HTPF. Photographs have been taken. Representative sections are submitted in 23 cassettes.   Gross specimen was discussed with Dr. Neri by phone on 10/25/2024 at 14:35    Gross specimen was reviewed in person with Dr. Neri on 10/25/2024 at 15:45    SXG/AA/PLP    Summary of Cassettes:  Specimen Label Site  F  FSC1-2 representative sections of focally disrupted area with possible mass     3 Bronchial margin    4 vascular margins    5 closest parenchymal margin    6 Mass to pleural surface    7 representative section of mass with proximal bronchus    8 representative section of mass with parenchymal margin    9 representative section of mass with pleural surface and disrupted area    10-11 representative section of mass with disrupted area    12 representative section of mass with pleural surface    13-14 Representative section of mass with disrupted area and  "parenchymal margin    15  nodule entirely submitted with closest pleural surface    16 Nodule entirely submitted    17 Nodule entirely submitted    18 Nodule entirely submitted    19 nodule entirely submitted with closest pleural surface    20 largest lymph node serially sectioned    21 fragments of 1 possible lymph node    22-23 representative sections of uninvolved parenchyma    G. Received in formalin, labeled with the patient's name and hospital number and \"level 10 lymph node\", are multiple fragments of possible lymph node aggregating to 1.5 x 0.8 x 0.5 cm.  The specimen is entirely submitted in 1 cassette.  PLP      H. Received in formalin, labeled with the patient's name and hospital number and \"level 4 lymph node\", are multiple fragments of possible lymph node aggregating to 1.4 x 1.3 x 0.4 cm.  The specimen is entirely submitted in 1 cassette.  PLP           Intraoperative Consultation       F: Received Date and Time: 10/23/2024 15:03       Called Date and Time: 10/23/2024 15:30  Intraoperative Diagnosis:  FSF1 and 2: positive for carcinoma, favor squamous cell carcinoma    Intraoperative Consult Pathologist(s):  Eva Ayers MD, PhD         Disclaimer       One or more of the reagents used to perform assays on this specimen MAY have contained components considered to be analyte specific reagents (ASR's).  ASR's have not been cleared or approved by the U.S. Food and Drug Administration.  These assays were developed and their performance characteristics determined by the Department of Pathology at [facility]. The FDA does not require this test to go through premarket FDA review. This test is used for clinical purposes. It should not be regarded as investigational or for research. This laboratory is certified under the Clinical Laboratory Improvement Amendments (CLIA) as qualified to perform high complexity clinical laboratory testing.  The assays were performed with " appropriate positive and negative controls which stained appropriately.     Focused Solid Tumor Assay   Result Value Ref Range    Focused Solid Tumor DNA/RNA Results       Specimen: FFPE, [id number] F14  Estimated Tumor Content: 30%    MICROSATELLITE STATUS: Microsatellite Instability-High (MSI-H) is NOT DETECTED.      DISEASE ASSOCIATED GENOMIC FINDINGS:   FGFR1 p.H769R ([id number] c.[id number]>G)  FGFR1 amplification  TP53 p.Y103* ([id number] c.309C>G)    DISEASE RELEVANT ALTERATIONS NOT DETECTED:   Negative for ALK fusion.  Negative for BRAF V600E.  Negative for EGFR sensitizing mutation.  Negative for ERBB2 activating mutation  Negative for KRAS G12C.  Negative for MET exon 14 skipping mutation.  Negative for NTRK fusion.  Negative for RET fusion.  Negative for ROS1 fusion.        Archival material from this surgical specimen has been reviewed by the pathologist in order to determine the most appropriate tumor tissue for further molecular testing. The identification and selection of this tumor tissue is critical to the success of subsequent molecular testing and analysis. After review, the pathologist has determined that this specimen requires microdissection to extract the targeted tumor area for optimal molecular testing and analysis. This targeted area has been recorded by the pathologist.    DISCLAIMER:   This assay is designed to detect targeted clinically-relevant single nucleotide variants, insertion and deletions (<30bp), whole gene high copy number amplifications, and translocations in a select group of genes. This assay does not distinguish between somatic and germline alterations in analyzed regions. A negative result (mutation not identified) does not rule out the presence of a mutation below the limit of detection of this assay due to low neoplastic cell content, tumor heterogeneity, or the presence of additional mutations in the listed genes which are outside of the target regions in this assay.  General population polymorphisms, promoter, synonymous and intronic variants (with the exception of splice variants) are not generally included in this report. The MSI-H/TONIA designation is based on analysis of up to 10 mononucleotide repeat loci and not based on the 5 or 7 MSI described in current clinical practice guidelines. The threshold for MSI-H/TONIA was determined by analytical concordance to dMMR IHC assays using mainly colorectal and uterine FFPE tissue. The clinical validity of the qualitative MSI designation has not been established.    Identification or absence of cancer-associated mutations does not necessarily indicate a response to therapy.  Decisions on patient care and treatment must be based on the independent medical judgment of the treating physician, taking into account all applicable information concerning the patient's condition such as clinical and histopathologic findings, other laboratory findings, and patient preferences. This report includes information from public sources, including scientific and medical literature to better characterize the significance of alterations detected.    This laboratory developed test was developed and its analytical performance characteristics have been determined by [facility]. This test has not been cleared or approved by the FDA; however, the FDA has determined that such approval is not necessary. The [facility] is certified under the Clinical Laboratory Improvement Amendments of 1988 (CLIA-88) as qualified to perform high complexity testing.    PANEL GENE LIST:  Hotspot Mutations: AKT1, ALK, APC, ARAF, B2M, BRAF, CCND1, CDK4, CDKN2A, CREBBP, CTNNB1, EGFR, , ERBB2, ERBB3, ERBB4, ESR1, FBXW7, FGFR2, FGFR3, GNA11, GNAQ, H3F3A, KIFP7F8F, HRAS, IDH1, IDH2, JAK1, JAK2, JAK3, KEAP1, KIT, KRAS, MAP2K1, MAP2K2, MET, MTOR, NFE2L2, NRAS, PDGFRA, PIK3CA, POLE, PTEN, RET, ROS1, SMAD4, STK11, SMO, TP53, U2AF1.  Copy Number Variants: ALK, AR, BRAF, CCND1,  CDK4, CDK6, EGFR, ERBB2, FGFR1, FGFR2, FGFR3, FGFR4, KIT, KRAS, MET, MYC, MYCN, PDGFRA, PIK3CA.  Fusion Drivers: ABL1, ALK, AKT3, RAE, BRAF, EGFR, ERBB2, ERG, ETV1, ETV4, ETV5, FGFR1, FGFR2, FGFR3, MET, NTRK1, NTRK2, NTRK3, PDGFRA, PPARG, RAF1, RET, ROS1.    MSI Status Markers: mononucleotide repeat loci.  Not all exons of all genes are sequenced. Genome assembly (hg19) was used for alignment and variant calling.        Electronically signed and reported by Richar Mirza MD PhD          2:42 PM      Results were successfully communicated with the guardian and daughter: Danya  and they acknowledged their understanding. Danya understands that the normal course includes a referral to Medical Oncology (which we will place today) though her mother has declined chemotherapy in the past she may be open to discussion for more information.     Gissell Pacheco, DO  Thoracic & Esophageal Surgery

## 2024-11-14 NOTE — PROGRESS NOTES
C/C:  Post-op visit    History Of Present Illness  Brianne Mallory is a 79 y.o. female h/o Lt upper lobe pT1cN0 NSCLC s/p lobectomy by Dr. Martinez on 1/21/21. She presents today for her first post-op visit after undergoing a robotic right upper lobectomy on 10/23/24, she did very well post-op other than some brief postural/orthostatic hypotension and was discharged home on POD #2. Intra-op path looked like NSCLC, SCC - final pathology is pending    Since that time has been doing well overall. Did have some pain and some fatigue but energy level is starting to stabilize. Denies any major cough. No significant SOB.      By way of review: patient was previously seen by my partner, Dr. Martinez - she presented as a 75yoF who was found to have a left upper lobe nodule that on bronch was biopsy + NSCLC. She reported no significant resp sxs at that time including no cough, hemoptysis or fever. This nodule was found when she was admitted to an OSH with a PE. She was treated with anticoagulation. She is now s/p VATS converted to open VAELRIE for a GERTRUDE wedge with completion lobectomy for pT1cN0 NSCLC on 1/21/21.    During surveillance was noted to have a very slowly growing RUL lesion. She had seen Dr. Sarkar, Rad Onc, and had multiple biopsies which showed no malignancy (organized pneumonia) however despite multiple rounds of abx the lesion has continued to grow concerning for underlying malignant component. Eventually this prompted surgical resection.    Past Medical History  She has a past medical history of Anxiety, Cataract, Clotting disorder (Multi), COPD (chronic obstructive pulmonary disease) (Multi), Coronary artery calcification seen on CT scan, GERD (gastroesophageal reflux disease), History of blood transfusion, History of cancer of upper lobe bronchus or lung, Hyperlipidemia, Hypertension, Hypothyroidism, Mass of upper lobe of right lung, and Sprain of ankle, left (09/11/2024).    Social History  She reports that she  has quit smoking. Her smoking use included cigarettes. She started smoking about 5 years ago. She has a 5.5 pack-year smoking history. She has never used smokeless tobacco. She reports that she does not currently use alcohol. She reports that she does not use drugs.      Medications    Current Outpatient Medications:     acetaminophen (Tylenol) 325 mg tablet, Take 2 tablets (650 mg) by mouth every 4 hours., Disp: , Rfl:     levothyroxine (Synthroid, Levoxyl) 88 mcg tablet, Take 1 tablet (88 mcg) by mouth early in the morning.., Disp: , Rfl:     sertraline (Zoloft) 25 mg tablet, Take 1 tablet (25 mg) by mouth once daily., Disp: , Rfl:     simvastatin (Zocor) 40 mg tablet, Take 1 tablet (40 mg) by mouth once daily at bedtime., Disp: , Rfl:     lidocaine 4 % patch, Place 2 patches over 12 hours on the skin once daily. Remove & discard patch within 12 hours or as directed by MD. (Patient not taking: Reported on 11/7/2024), Disp: , Rfl:     pantoprazole (ProtoNix) 40 mg EC tablet, Take 1 tablet (40 mg) by mouth once daily in the morning. Take before meals. Do not crush, chew, or split. (Patient not taking: Reported on 10/22/2024), Disp: 30 tablet, Rfl: 0    traMADol (Ultram) 50 mg tablet, Take 1 tablet (50 mg) by mouth every 6 hours if needed (pain). (Patient not taking: Reported on 11/7/2024), Disp: 15 tablet, Rfl: 0    Allergies  Aspirin, Ibuprofen, Nickel, Sulfa (sulfonamide antibiotics), and Penicillins    Review of Systems:  Review of Systems   Constitutional: No fevers, chills, unexpected weight change  HENT: No sore throat, congestion, or nasal drainage  Eyes: No visual changes or eye itching  Respiratory:  see HPI. No cough, worsening dyspnea, wheezing  Cardiac: No chest pain, palpitations, or lower extremity edema  Gastrointestinal: No nausea, vomiting, diarrhea. No abdominal pain  Genitourinary: No dysuria or hematuria  Musculoskeletal: No back pain. No significant myalgias or arthralgias  Neurologic: No  headaches, dizziness, or seizures.  Hematologic: No east bleeding or bruising.  Psychiatric: No anxiety or depression.    Physical Exam:  Physical Exam  /66 (BP Location: Right arm, Patient Position: Sitting)   Pulse 73   Temp 37.1 °C (98.8 °F) (Temporal)   Resp 18   Wt 98 kg (216 lb 1.6 oz)   SpO2 96%   BMI 32.86 kg/m²   Constitutional:       General: Patient is not in acute distress.     Appearance: Normal appearance; not ill-appearing.   HENT:      Head: Normocephalic.      Nose: No congestion or rhinorrhea.   Cardiovascular:      Rate and Rhythm: Normal rate and regular rhythm.      Pulses: Normal pulses.   Pulmonary:      Effort: Pulmonary effort is normal. No respiratory distress.  No conversational dyspnea    Chest: Rt robotic incisions c/d/i     Breath sounds: No stridor. No wheezing.   Abdominal:      General: There is no distension.      Palpations: Abdomen is soft.      Tenderness: There is no abdominal tenderness.   Musculoskeletal:         General: No swelling, tenderness or deformity. Normal range of motion.      Cervical back: Normal range of motion. No rigidity.   Lymphadenopathy:      Cervical: No cervical adenopathy.   Skin:     General: Skin is warm and dry.   Neurological:      General: No focal deficit present.      Mental Status: Patient is alert and oriented to person, place, and time.   Psychiatric:         Mood and Affect: Mood normal.         Relevant Results:     Pathology:    Final surgical path pending      Component    FINAL DIAGNOSIS   A. LUNG, RIGHT UPPER LOBE; TRANSBRONCHIAL BIOPSY:      -- Non-diagnostic fragments of bronchial wall tissue. See comment   Electronically signed by Maurice Neri MD on 6/12/2024 at 0923       Component    Final Cytological Interpretation   A. LUNG, RIGHT UPPER LOBE, NODULE, FINE NEEDLE ASPIRATION, CYTOLOGY AND CELL BLOCK:  --  Non diagnostic.  Specimen consists of benign lung elements and acute and chronic inflammatory cells.  --  Correlate  with concurrent biopsy surgical pathology report ([id number]).               B. LUNG, RIGHT UPPER LOBE, NODULE, BRONCHIAL BRUSH, CYTOLOGY AND CELL BLOCK:  --  Non diagnostic.  Specimen consists of benign lung elements.  --  Correlate with concurrent biopsy surgical pathology report ([id number]).                             C. LYMPH NODE, 4 L PULMONARY, FINE NEEDLE ASPIRATION, CYTOLOGY AND CELL BLOCK:  --  No malignant cells identified.  --  Lymphoid sample.           D. LYMPH NODE, 4 R PULMONARY, FINE NEEDLE ASPIRATION, CYTOLOGY AND CELL BLOCK:  --  No malignant cells identified.  --  Lymphoid sample.                        E. LYMPH NODE,  7 PULMONARY, FINE NEEDLE ASPIRATION, CYTOLOGY AND CELL BLOCK:  --  No malignant cells identified.  --  Lymphoid sample.            F. LYMPH NODE, 11 Rs PULMONARY, FINE NEEDLE ASPIRATION, CYTOLOGY AND CELL BLOCK:  --  No malignant cells identified.  --  Lymphoid sample.                         G. LYMPH NODE, 11 Ri PULMONARY, FINE NEEDLE ASPIRATION, CYTOLOGY AND CELL BLOCK:  --  No malignant cells identified.  --  Lymphoid sample.      Electronically signed by Vaishnavi Bell MD on 6/13/2024 at 1253       Component 3 yr ago   Pathology Report Name JANELL MANJARREZ                                                                                                 Accession #: [id number]            Pathologist:                   RIGO EVERETT MD  Date of Procedure:    1/21/2021  Date Received:          1/21/2021  Date Reported           2/8/2021  Submitting Physician:   GREG BURGOS MD  Location:                    OR  Other External #                                                                   FINAL DIAGNOSIS  A.  LUNG, LEFT UPPER LOBE, WEDGE:  --INVASIVE POORLY DIFFERENTIATED ADENOCARCINOMA. SEE CASE SUMMARY REPORT.     B.  LEVEL 9 LYMPH NODE:  --LYMPH NODE WITH NO EVIDENCE OF MALIGNANCY.     C.  LEVEL 10 LYMPH NODE:  --LYMPH NODE FRAGMENTS WITH NO EVIDENCE OF  MALIGNANCY.     D.  LEVEL 10 LYMPH NODE #2:  --LYMPH NODE FRAGMENTS WITH NO EVIDENCE OF MALIGNANCY.     E.  LEVEL 5 LYMPH NODE:  --LYMPH NODE FRAGMENTS WITH NO EVIDENCE OF MALIGNANCY.     F.  LEVEL 7 LYMPH NODE:  --LYMPH NODE FRAGMENTS WITH NO EVIDENCE OF MALIGNANCY.     G.  LUNG, LEFT UPPER LOBE, LOBECTOMY:    --RESIDUAL ADENOCARCINOMA. SEE CASE SUMMARY REPORT.  --3 LYMPH NODES WITH NO EVIDENCE OF MALIGNANCY.    H.  RIB:    --GROSSLY UNREMARKABLE SEGMENT OF RIB.    I.  LUNG, RIGHT LOWER LOBE, WEDGE:    --SMALL PORTION OF LUNG WITH RECENT HEMORRHAGE AND SMALL SUBPLEURAL LYMPHOID  AGGREGATE; NO EVIDENCE OF MALIGNANCY.    [email or web address]    The gross and/or microscopic findings were reviewed in conjunction with  pathology resident, Stephanie Jc M.D.                                                                                                                                               CASE SUMMARY REPORT       SPECIMEN  Procedure:      Wedge resection       Completion lobectomy  Specimen Laterality:      Left  TUMOR  Tumor Site:      Upper lobe of lung  Histologic Type:      Invasive adenocarcinoma, acinar predominant        Other Subtypes Present: Micropapillary, cribriform, solid  Histologic Grade:      G3: Poorly differentiated     Total Tumor Size (size of entire tumor):      Greatest Dimension  (Centimeters): 2.4 cm  Tumor Focality:      Single focus  Visceral Pleura Invasion:      Not identified  Direct Invasion of Adjacent Structures:      No adjacent structures present  Treatment Effect:      No known presurgical therapy  Lymphovascular Invasion:      Not identified  MARGINS  Margins:      All margins are uninvolved by tumor   Margins Examined:        Bronchial         Vascular         Parenchymal   Distance of Invasive Carcinoma from Closest Margin (Centimeters):          3.0 cm    Closest Margin:         Bronchial  LYMPH NODES  Regional Lymph Nodes:   Number of Lymph Nodes Involved:        0   Number of Lymph  "Nodes Examined:        At least: 8    John Stations Examined:         7: Subcarinal          5: Subaortic / aortopulmonary (AP) / AP window          9L: Pulmonary ligament          10L: Hilar          12L: Lobar          Left: Intraparenchymal  PATHOLOGIC STAGE CLASSIFICATION (pTNM, AJCC 8th Edition)  Primary Tumor (pT):      pT1c  Regional Lymph Nodes (pN):      pN0  ADDITIONAL FINDINGS  Additional Findings:      Inflammation (type): Patchy chronic interstitial  inflammation       Emphysema        Respiratory bronchiolitis; recent hemorrhage  ADDITIONAL TESTING  REPRESENTATIVE BLOCKS:      Normal Block: G8       Tumor Block: A3       Imaging:    Pulmonary Functions Testing Results:    No results found for: \"FEV1\", \"FVC\", \"HKJ3DOP\", \"TLC\", \"DLCO\"    CXR was personally reviewed     Assessment/Plan   Problem List Items Addressed This Visit             ICD-10-CM    Malignant neoplasm of upper lobe of left lung (Multi) C34.12    Relevant Orders    XR chest 2 views (Completed)       Brianne Mallory is a 79 y.o. female h/o Lt upper lobe pT1cN0 NSCLC s/p lobectomy by Dr. Martinez on 1/21/21. She presents today for her first post-op visit after undergoing a robotic right upper lobectomy on 10/23/24, she did very well post-op other than some brief postural/orthostatic hypotension and was discharged home on POD #2. Intra-op path looked like NSCLC, SCC - final pathology is pending. Given this new information/diagnosis of lung cancer and patient's confirming pathology will plan for surveillance CT scanning for the next 5 years. I d/w the patient the plan for CT chest every 6 months for the first 2 years followed by annually to total 5 years. Patient voiced understanding.  I will call her/daughter with final pathology. She is unsure if she would like any additional treatment though has been very hesitant/opposed to this in the past. We will discuss further pending final path.       Gissell Pacheco, DO  Thoracic & Esophageal " Surgery

## 2024-11-17 DIAGNOSIS — C34.90 MALIGNANT NEOPLASM OF LUNG, UNSPECIFIED LATERALITY, UNSPECIFIED PART OF LUNG (MULTI): ICD-10-CM

## 2024-11-17 NOTE — PATIENT INSTRUCTIONS
"Dr. Pacheco would like you to get a new CT scan and virtual visit to follow in 3 months. You are scheduled for a CT scan in [place] and then Dr Pacheco will do a virtual video with you the following week.  You will get a link in [employer] to connect with the video.  See appointment below in \"What's Next\".  Call our office with any questions. [phone]    "

## 2024-11-19 NOTE — DOCUMENTATION CLARIFICATION NOTE
"    PATIENT:               JANELL MANJARREZ  ACCT #:                  [id number]  MRN:                       [id number]  :                       1945  ADMIT DATE:       10/23/2024 9:07 AM  DISCH DATE:        10/25/2024 4:30 PM  RESPONDING PROVIDER #:        [id number]          PROVIDER RESPONSE TEXT:    Pathology findings squamous cell carcinoma , involving lung parenchyma    CDI QUERY TEXT:    Clarification        Instruction:    Based on your assessment of the patient and the clinical information, please provide the requested documentation by clicking on the appropriate radio button and enter any additional information if prompted.    Question: Based on your assessment of the patient and the pathology findings, can the pathology findings be confirmed by providing the requested documentation to include if specimen is benign or malignant, primary or secondary and any metastatic sites.  Please include diagnosis of disease    When answering this query, please exercise your independent professional judgment. The fact that a question is being asked, does not imply that any particular answer is desired or expected.    The patient's clinical indicators include:  Clinical Information:  Discharge Summary 10/25/24 by Shy Angel APRN-CNP:    \"79 year old female who presents with a PET Avid RUL mass who underwent biopsy which was non-diagnostic.  She is now POD#2 s/p a robotic right upper lobectomy. \"    Surgical Pathology from 10/23/24 resulted as -  FINAL DIAGNOSIS  A. LYMPH NODE, LEVEL 9, EXCISION:  - One lymph node with no evidence of carcinoma (0/1).    B. LYMPH NODE, LEVEL 8, EXCISION:  - One lymph node with no evidence of carcinoma (0/1).    C. LYMPH NODE. LEVEL 7, EXCISION:  - Fragments of lymph node with no evidence of carcinoma (0/1).    D. LYMPH NODE, LEVEL 11, EXCISION:  - Fragments of lymph node with no evidence of carcinoma (0/1).    E. LYMPH NODE, LEVEL 12, EXCISION:  - One lymph node with no evidence of " "carcinoma (0/1).    F. 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 See cancer summary report and note.  - Margins of resection are negative for carcinoma, see note.  - Two lymph nodes with no evidence of carcinoma (0/2).  - Background lung with post-obstructive changes, atypical adenomatous hyperplasia, and emphysematous changes.    Clinical Indicators:  Surgical Pathology Exam 10/23/24:  \"Note: In the bronchial en face margin, the bronchial lumen is negative for carcinoma, however squamous cell carcinoma is seen involving the soft tissue around the bronchial wall, and shows cautery artifact.\"    Treatment: s/p a robotic right upper lobectomy    Risk Factors: Mass of upper lobe of right lung  Options provided:  -- Pathology findings squamous cell carcinoma , involving lung parenchyma, Please specify additional information below  -- Other - I will add my own diagnosis  -- Refer to Clinical Documentation Reviewer    Query created by: Melia Shah on 11/14/2024 11:11 AM      Electronically signed by:  KIMBERLEY VIERA DO 11/19/2024 12:59 PM          "

## 2024-11-27 ENCOUNTER — PATIENT OUTREACH (OUTPATIENT)
Dept: HEMATOLOGY/ONCOLOGY | Facility: CLINIC | Age: 79
End: 2024-11-27
Payer: MEDICARE

## 2024-11-27 NOTE — PROGRESS NOTES
Introduction:  Patient called, no answer VM left with callback number.  Patient's daughter (primary contact) called, no answer VM left with callback number.

## 2024-11-29 ENCOUNTER — PATIENT OUTREACH (OUTPATIENT)
Dept: HEMATOLOGY/ONCOLOGY | Facility: CLINIC | Age: 79
End: 2024-11-29
Payer: MEDICARE

## 2024-11-29 NOTE — PROGRESS NOTES
Introduction:   Called patients daughter/primary contact. No answer. VM left with contact and callback number.     Appt for Monday Unconfirmed at this time.

## 2024-12-02 ENCOUNTER — OFFICE VISIT (OUTPATIENT)
Dept: HEMATOLOGY/ONCOLOGY | Facility: CLINIC | Age: 79
End: 2024-12-02
Payer: MEDICARE

## 2024-12-02 VITALS
BODY MASS INDEX: 34.97 KG/M2 | SYSTOLIC BLOOD PRESSURE: 131 MMHG | DIASTOLIC BLOOD PRESSURE: 79 MMHG | OXYGEN SATURATION: 96 % | HEART RATE: 62 BPM | WEIGHT: 217.6 LBS | TEMPERATURE: 97.7 F | HEIGHT: 66 IN | RESPIRATION RATE: 18 BRPM

## 2024-12-02 DIAGNOSIS — C34.01 SQUAMOUS CELL CARCINOMA OF HILUM OF RIGHT LUNG (MULTI): ICD-10-CM

## 2024-12-02 PROCEDURE — 3075F SYST BP GE 130 - 139MM HG: CPT | Performed by: STUDENT IN AN ORGANIZED HEALTH CARE EDUCATION/TRAINING PROGRAM

## 2024-12-02 PROCEDURE — 99215 OFFICE O/P EST HI 40 MIN: CPT | Performed by: STUDENT IN AN ORGANIZED HEALTH CARE EDUCATION/TRAINING PROGRAM

## 2024-12-02 PROCEDURE — 1126F AMNT PAIN NOTED NONE PRSNT: CPT | Performed by: STUDENT IN AN ORGANIZED HEALTH CARE EDUCATION/TRAINING PROGRAM

## 2024-12-02 PROCEDURE — 3078F DIAST BP <80 MM HG: CPT | Performed by: STUDENT IN AN ORGANIZED HEALTH CARE EDUCATION/TRAINING PROGRAM

## 2024-12-02 PROCEDURE — 99205 OFFICE O/P NEW HI 60 MIN: CPT | Performed by: STUDENT IN AN ORGANIZED HEALTH CARE EDUCATION/TRAINING PROGRAM

## 2024-12-02 PROCEDURE — 1036F TOBACCO NON-USER: CPT | Performed by: STUDENT IN AN ORGANIZED HEALTH CARE EDUCATION/TRAINING PROGRAM

## 2024-12-02 PROCEDURE — 1159F MED LIST DOCD IN RCRD: CPT | Performed by: STUDENT IN AN ORGANIZED HEALTH CARE EDUCATION/TRAINING PROGRAM

## 2024-12-02 RX ORDER — CYANOCOBALAMIN (VITAMIN B-12) 250 MCG
250 TABLET ORAL DAILY
COMMUNITY

## 2024-12-02 ASSESSMENT — PATIENT HEALTH QUESTIONNAIRE - PHQ9
2. FEELING DOWN, DEPRESSED OR HOPELESS: SEVERAL DAYS
10. IF YOU CHECKED OFF ANY PROBLEMS, HOW DIFFICULT HAVE THESE PROBLEMS MADE IT FOR YOU TO DO YOUR WORK, TAKE CARE OF THINGS AT HOME, OR GET ALONG WITH OTHER PEOPLE: SOMEWHAT DIFFICULT
1. LITTLE INTEREST OR PLEASURE IN DOING THINGS: SEVERAL DAYS
SUM OF ALL RESPONSES TO PHQ9 QUESTIONS 1 AND 2: 2

## 2024-12-02 ASSESSMENT — COLUMBIA-SUICIDE SEVERITY RATING SCALE - C-SSRS
1. IN THE PAST MONTH, HAVE YOU WISHED YOU WERE DEAD OR WISHED YOU COULD GO TO SLEEP AND NOT WAKE UP?: NO
6. HAVE YOU EVER DONE ANYTHING, STARTED TO DO ANYTHING, OR PREPARED TO DO ANYTHING TO END YOUR LIFE?: NO
2. HAVE YOU ACTUALLY HAD ANY THOUGHTS OF KILLING YOURSELF?: NO

## 2024-12-02 ASSESSMENT — ENCOUNTER SYMPTOMS
LOSS OF SENSATION IN FEET: 0
OCCASIONAL FEELINGS OF UNSTEADINESS: 1
DEPRESSION: 1

## 2024-12-02 ASSESSMENT — PAIN SCALES - GENERAL: PAINLEVEL_OUTOF10: 0-NO PAIN

## 2024-12-02 NOTE — PROGRESS NOTES
[facility] - Medical Oncology New Patient Visit    Patient ID: Brianne Mallory is a 79 y.o. female.  Referring Physician: Gissell Pacheco DO  [id number]  [place] [id number]  Primary Care Provider: Deborah Schneider DO       DIAGNOSIS  NSCLC squamous cell ca   NSCLC adenocarcinoma     STAGING  GERTRUDE:  pT1c pN0  RUL: pT3 pN0     CURRENT SITES OF DISEASE        MOLECULAR GENOMICS  RUL:  FGFR1 p.H769R ([id number] c.2306A>G)  FGFR1 amplification  TP53 p.Y103* (NM_[id number] c.309C>G)       PRIOR THERAPY  GERTRUDE lobectomy 21   RUL lobectomy 10/23/24     CURRENT THERAPY           CURRENT ONCOLOGICAL PROBLEMS           HISTORY OF PRESENT ILLNESS  Ms. Mallory is an 80 yo with PMH significant for COPD, HLD, and hypothyroidism who was originally diagnosed with NSCLC adenocarcinoma in . She had a GERTRUDE wedge resection by Dr. Martinez on 21 with completion lobectomy, staged as pT1cN0. She was noted during surveillance with Dr. Kramer to have a slow-growing RUL lesion, which was biopsied multiple times with no malignancy (6/10/24 - nondiagnostic, 24 - organizing pneumonia). She was then referred to Dr. Pacheco who took her for RUL lobectomy on 10/23/24. Pathology returned with NSCLC squamous cell carcinoma, 5.5 cm, negative margins, negative lymph nodes 9, 8, 7, 11, 12, 10, 4, poorly differentiated, no VPI, no LVI, staged as pT3 pN0.     She is here today with her daughter. She has some concerns of forgetfulness. She uses a cane to walk normally, she has not fallen recently or within the last 6 months. She is very independent - does all of her own cooking, cleaning, and laundry. She has recovered from surgery quite well.        PAST MEDICAL HISTORY  COPD  HLD  Hypothyroidism  R hip, bilateral knee replacements     SOCIAL HISTORY  Lives at home alone. Retired - homemaker.   three years ago.   Tob: quit for 30 years ago, smoked again, then quit again. Smoked  probably about 30 years, 1 ppd  EtOH: very rarely  Illicits: none     FAMILY HISTORY  Maternal grandmother - lung cancer  Brother - prostate cancer  Daughter - breast cancer; negative breast cancer  Son - neuroendocrine cancer  Daughter - melanoma    Meds (Current):    Current Outpatient Medications:     acetaminophen (Tylenol) 325 mg tablet, Take 2 tablets (650 mg) by mouth every 4 hours., Disp: , Rfl:     levothyroxine (Synthroid, Levoxyl) 88 mcg tablet, Take 1 tablet (88 mcg) by mouth early in the morning.., Disp: , Rfl:     lidocaine 4 % patch, Place 2 patches over 12 hours on the skin once daily. Remove & discard patch within 12 hours or as directed by MD. (Patient not taking: Reported on 11/7/2024), Disp: , Rfl:     pantoprazole (ProtoNix) 40 mg EC tablet, Take 1 tablet (40 mg) by mouth once daily in the morning. Take before meals. Do not crush, chew, or split. (Patient not taking: Reported on 10/22/2024), Disp: 30 tablet, Rfl: 0    sertraline (Zoloft) 25 mg tablet, Take 1 tablet (25 mg) by mouth once daily., Disp: , Rfl:     simvastatin (Zocor) 40 mg tablet, Take 1 tablet (40 mg) by mouth once daily at bedtime., Disp: , Rfl:     traMADol (Ultram) 50 mg tablet, Take 1 tablet (50 mg) by mouth every 6 hours if needed (pain). (Patient not taking: Reported on 11/7/2024), Disp: 15 tablet, Rfl: 0    Allergies   Allergen Reactions    Aspirin Hives    Ibuprofen Hives    Nickel Unknown    Sulfa (Sulfonamide Antibiotics) Unknown    Penicillins Rash     mild; occurred approx 20 years ago       Review of Systems   All other systems reviewed and are negative.       Objective   BSA: 2.15 meters squared  Wt Readings from Last 5 Encounters:   12/02/24 98.7 kg (217 lb 9.6 oz)   11/07/24 98 kg (216 lb 1.6 oz)   10/25/24 102 kg (224 lb 11.2 oz)   10/17/24 100 kg (221 lb)   10/09/24 99.7 kg (219 lb 12.8 oz)     /79 (BP Location: Left arm, Patient Position: Sitting, BP Cuff Size: Adult)   Pulse 62   Temp 36.5 °C (97.7 °F)  "(Temporal)   Resp 18   Ht (S) 1.683 m (5' 6.26\")   Wt 98.7 kg (217 lb 9.6 oz)   SpO2 96%   BMI 34.85 kg/m²     ECOG Score: 1- Restricted in physically strenuous activity.  Carries out light duty.      Physical Exam  Vitals reviewed.   Constitutional:       General: She is not in acute distress.  HENT:      Head: Normocephalic and atraumatic.      Mouth/Throat:      Mouth: Mucous membranes are moist.   Eyes:      Extraocular Movements: Extraocular movements intact.      Conjunctiva/sclera: Conjunctivae normal.      Pupils: Pupils are equal, round, and reactive to light.   Cardiovascular:      Rate and Rhythm: Normal rate and regular rhythm.      Heart sounds: Normal heart sounds. No murmur heard.  Pulmonary:      Effort: Pulmonary effort is normal. No respiratory distress.      Breath sounds: Normal breath sounds.   Abdominal:      General: Bowel sounds are normal. There is no distension.      Palpations: Abdomen is soft.   Skin:     General: Skin is warm and dry.   Neurological:      General: No focal deficit present.      Mental Status: She is alert and oriented to person, place, and time.   Psychiatric:         Mood and Affect: Mood normal.         Behavior: Behavior normal.         Thought Content: Thought content normal.          Results:  Labs:  Lab Results   Component Value Date    WBC 12.5 (H) 10/25/2024    HGB 9.8 (L) 10/25/2024    HCT 30.3 (L) 10/25/2024    MCV 95 10/25/2024     10/25/2024      Lab Results   Component Value Date    NEUTROABS 5.61 (H) 01/23/2021      Lab Results   Component Value Date    GLUCOSE 118 (H) 10/25/2024    CALCIUM 8.6 10/25/2024     10/25/2024    K 3.7 10/25/2024    CO2 26 10/25/2024     10/25/2024    BUN 11 10/25/2024    CREATININE 0.62 10/25/2024     Lab Results   Component Value Date    ALT 6 (L) 10/09/2024    AST 14 10/09/2024    ALKPHOS 62 10/09/2024    BILITOT 0.3 10/09/2024        Imaging:  I have personally reviewed the below imaging and concur with " the reported findings unless otherwise stated:    === Results for orders placed in visit on 09/04/24 ===    CT chest wo IV contrast [[id number]] 09/04/2024    Status: Normal  1. Further increase in the size of the spiculated right upper lobe  mass measuring currently 4.8 x 3.3 cm in comparison to 3.2 x 3.2 cm  with surrounding right upper lobe interstitial septal thickening and  scattered micro nodules. Finding would still be concerning for  neoplastic process despite the previous negative biopsy findings  given continued growth.  2. Stable changes of left upper lobectomy.  3. Prominent right lower paratracheal lymph node measuring 1 cm which  showed FDG uptake in prior PET scan dated 05/16/2020.  4. Minimal hepatic contour irregularity. This finding is nonspecific,  however advise correlation with liver function tests.  5. Uncomplicated cholelithiasis.  6. Partially visualized left cortical renal hypoattenuating lesion  measuring 2.9 cm likely cyst.    Signed by: Srinivas Bush 9/6/2024 2:55 PM  Dictation workstation:   [id number]      === Results for orders placed during the hospital encounter of 07/09/24 ===    CT guided percutaneous biopsy lung [[id number]] 07/09/2024    Status: Normal  1. Successful CT-guided biopsy of a right upper lobe pulmonary mass.  Post biopsy images demonstrated small amount of hemorrhagic  components surrounding the lesion and along the biopsy tract. No  pneumothorax was visualized at the time of procedure and on follow-up  chest x-rays immediately and 3 hours after the procedure. Patient  tolerated the procedure well.    I personally reviewed the images/study and I agree with the findings  as stated by resident physician Dr. Vinnie Degroot . This study  was interpreted at [facility],  [place], [place].    I personally reviewed the image(s)/study and interpretation. I agree  with the findings as stated.    Performed and dictated at [facility].    MACRO:  None.    Signed by: Sivtlana Peña 7/9/2024 4:00 PM  Dictation workstation:   [id number]      === Results for orders placed during the hospital encounter of 06/10/24 ===    CT chest wo IV contrast for JAQUELINE rey planning [[id number]] 06/10/2024    Status: Normal  1.  Interval increase in size of left apical parenchymal lung mass.  2. Post lobectomy changes  3. Severe atherosclerotic calcifications of the thoracic aorta and  coronary arteries and correlate with coronary artery disease risk  factors.    MACRO:  None    Signed by: Romain Marshall 6/10/2024 2:17 PM  Dictation workstation:   [id number]      === Results for orders placed during the hospital encounter of 04/29/24 ===    CT chest wo IV contrast [[id number]] 04/29/2024    Status: Normal  1. New 2.4 x 2.2 cm spiculated right upper lobe nodule highly  suspicious for malignancy. Further assessment with PET-CT and tissue  sampling is recommended.  2. Stable postsurgical changes of left upper lobectomy.  3. Mild emphysema.    I personally reviewed the images/study and I agree with the findings  as stated by resident physician Dr. Rey Buchanan.    MACRO:  Critical Finding:  New/enlarging lung nodule suspicious for cancer.  Notification was initiated on 4/30/2024 at 6:04 pm by  Dez Buchanan.  (**-YCF-**) Instructions:    Signed by: Agustin Walsh 4/30/2024 10:01 PM  Dictation workstation:   [id number]      === Results for orders placed in visit on 04/11/23 ===    CT chest wo IV contrast [[id number]]     Status: Normal  1.  Postsurgical changes related to left upper lobectomy. No  suspicious pulmonary nodules.  2. Other stable chronic findings as described above.  No results found for this or any previous visit.  === Results for orders placed in visit on 10/18/24 ===    Nuclear Stress Test [SAKAAX31] 10/18/2024    Status: Normal  Normal Lexiscan Myoview cardiac perfusion stress test.  No evidence of ischemia or myocardial  infarction by perfusion imaging.  Normal left ventricular systolic function, ejection fraction 66%.  Noninvasive risk stratification: Low risk.  No previous available for comparison.    Signed by: Jas Dela Cruz 10/18/2024 4:26 PM  Dictation workstation:   [id number]  === Results for orders placed during the hospital encounter of 08/15/24 ===    Complete Pulmonary Function Test (Spirometry/DLCO/Lung Volumes) [PFT74] 08/15/2024    Status: Normal  === Results for orders placed during the hospital encounter of 05/16/24 ===    NM PET CT lung CA staging [TQP3243] 05/16/2024    Status: Normal  1. Previously described spiculated nodule in the right upper lobe  demonstrates intense hypermetabolic activity and is most compatible  with malignancy. Intensely hypermetabolic station 4R lymph nodes are  most consistent with metastatic disease.  2. Postsurgical changes of left upper lobectomy without evidence of  abnormal hypermetabolic activity along the resection site to indicate  recurrent disease.  3. No evidence of FDG avid distant metastatic disease.    I personally reviewed the image(s) / study and agree with the  findings and interpretation as stated. This study was interpreted at  [facility].    MACRO:  None    Signed by: Hank Balderrama 5/16/2024 11:50 AM  Dictation workstation:   [id number]  === Results for orders placed in visit on 02/12/21 ===    CHEST 2 VIEW [[id number]] 02/12/2021    Status: Normal  1. Similar appearance postoperative changes from a left upper  lobectomy with persistent elevation of the left hemidiaphragm and a  small left sided pleural effusion.  2. There is been interval improvement in the left-sided pneumothorax.      I personally reviewed the images/study and I agree with the findings  as stated. This study was interpreted at [place], [place].      === Results for orders placed in visit on 02/05/21 ===    CHEST 2 VIEW  [[id number]] 02/05/2021    Status: Normal  1.  Postoperative changes of left upper lobectomy with left  hemithoracic volume loss and left hemidiaphragmatic elevation.  2. Slightly decreased size of left apical pneumothorax compared to  prior exam. Chest tube in unchanged position.  3. Small left basilar pleural effusion.    I personally reviewed the images/study and I agree with the findings  as stated. This study was interpreted at [place], [place].      === Results for orders placed in visit on 12/10/20 ===    XR CHEST 1 VIEW [[id number]] 12/10/2020    Status: Normal  1. Status post left upper lobe biopsy without any evidence of  pneumothorax.      I personally reviewed the images/study and I agree with the findings  as stated. This study was interpreted at [place], [place].    Pathology:    Lab Results   Component Value Date    PATHREP  01/21/2021     Name JANELL MANJARREZ                                                                                                   Accession #: [id number]            Pathologist:                   RIGO EVERETT MD  Date of Procedure:    1/21/2021  Date Received:          1/21/2021  Date Reported           2/8/2021  Submitting Physician:   GREG BURGOS MD  Location:                    [facility]  Other External #                                                                    FINAL DIAGNOSIS  A.  LUNG, LEFT UPPER LOBE, WEDGE:  --INVASIVE POORLY DIFFERENTIATED ADENOCARCINOMA. SEE CASE SUMMARY REPORT.      B.  LEVEL 9 LYMPH NODE:  --LYMPH NODE WITH NO EVIDENCE OF MALIGNANCY.      C.  LEVEL 10 LYMPH NODE:   --LYMPH NODE FRAGMENTS WITH NO EVIDENCE OF MALIGNANCY.     D.  LEVEL 10 LYMPH NODE #2:   --LYMPH NODE FRAGMENTS WITH NO EVIDENCE OF MALIGNANCY.     E.  LEVEL 5 LYMPH NODE:  --LYMPH NODE FRAGMENTS WITH NO EVIDENCE OF MALIGNANCY.      F.  LEVEL 7 LYMPH NODE:   --LYMPH NODE FRAGMENTS  WITH NO EVIDENCE OF MALIGNANCY.     G.  LUNG, LEFT UPPER LOBE, LOBECTOMY:    --RESIDUAL ADENOCARCINOMA. SEE CASE SUMMARY REPORT.  --3 LYMPH NODES WITH NO EVIDENCE OF MALIGNANCY.    H.  RIB:    --GROSSLY UNREMARKABLE SEGMENT OF RIB.    I.  LUNG, RIGHT LOWER LOBE, WEDGE:    --SMALL PORTION OF LUNG WITH RECENT HEMORRHAGE AND SMALL SUBPLEURAL LYMPHOID  AGGREGATE; NO EVIDENCE OF MALIGNANCY.    [email or web address]    The gross and/or microscopic findings were reviewed in conjunction with  pathology resident, Stephanie Jc M.D.                                                                                                                                               CASE SUMMARY REPORT       SPECIMEN  Procedure:      Wedge resection       Completion lobectomy  Specimen Laterality:      Left  TUMOR  Tumor Site:      Upper lobe of lung  Histologic Type:      Invasive adenocarcinoma, acinar predominant        Other Subtypes Present: Micropapillary, cribriform, solid  Histologic Grade:      G3: Poorly differentiated     Total Tumor Size (size of entire tumor):      Greatest Dimension  (Centimeters): 2.4 cm  Tumor Focality:      Single focus  Visceral Pleura Invasion:      Not identified  Direct Invasion of Adjacent Structures:      No adjacent structures present  Treatment Effect:      No known presurgical therapy  Lymphovascular Invasion:      Not identified  MARGINS  Margins:      All margins are uninvolved by tumor   Margins Examined:        Bronchial         Vascular         Parenchymal   Distance of Invasive Carcinoma from Closest Margin (Centimeters):           3.0 cm    Closest Margin:         Bronchial  LYMPH NODES  Regional Lymph Nodes:    Number of Lymph Nodes Involved:        0   Number of Lymph Nodes Examined:        At least: 8    John Stations Examined:         7: Subcarinal          5: Subaortic / aortopulmonary (AP) / AP window          9L: Pulmonary ligament          10L: Hilar          12L: Lobar          Left:  Intraparenchymal  PATHOLOGIC STAGE CLASSIFICATION (pTNM, AJCC 8th Edition)  Primary Tumor (pT):      pT1c  Regional Lymph Nodes (pN):      pN0  ADDITIONAL FINDINGS  Additional Findings:      Inflammation (type): Patchy chronic interstitial  inflammation       Emphysema        Respiratory bronchiolitis; recent hemorrhage  ADDITIONAL TESTING  REPRESENTATIVE BLOCKS:      Normal Block: G8       Tumor Block: A3                                                                                                                                                                                                                                                                                                                                              Electronically Signed Out By RIGO EVERETT MD/ZIAYD  By the signature on this report, the individual or group listed as making the  Final Interpretation/Diagnosis certifies that they have reviewed this case.         Intraoperative Consultation:  A: LEFT UPPER LOBE WEDGE    Frozen Section 1:  Date Ordered: 1/21/2021 12:30     Date Received: 1/21/2021 12:30     Date  Called: 1/21/2021 13:05    Intraoperative Diagnosis:  Invasive nonsmall cell carcinoma  Intraoperative Consult Pathologist(s):  ELLI LIGHT M.D. (P)               Clinical History:  Lung nodule    Specimens Submitted As:  A: LEFT UPPER LOBE WEDGE   B: LEVEL 9 LYMPH NODE   C: LEVEL 10 LYMPH NODE   D: LEVEL 10 LYMPH NODE #2   E: LEVEL 5 LYMPH NODE   F: LEVEL 7 LYMPH NODE   G: LEFT UPPER LOBE   H: RIB   I: RIGHT LOWER LOBE WEDGE     Gross Description:  A:  Received fresh for intraoperative consultation, labeled with the patient's  name and hospital number, is a lung wedge, 10.5 x 3.2 x 3.1 cm, 23.2 grams. The  pleura is red-gray, mottled lightly with black streaks, smooth, and glistening.    The stapled parenchymal margin measures 10.5 cm in length. After removal of  the staple line, this margin is inked blue.  The pleural  "surface is inked  black.  The specimen is sectioned. The cut surface reveals an irregular, poorly  demarcated, gray-white firm mass, 2.4 x 2.1 x 1.5 cm. The mass does not appear  to invade the pleura, 0.5 cm in the pleural surface.  The mass appears to abut  the staple line.  A representative section is frozen.  Representative sections  are submitted to [facility]. Representative sections are submitted in 5 cassettes.  IAD    B:  Received in formalin, labeled with the patient's name and hospital number  and \"A level IX lymph node\", is a possible lymph node measuring 1.2 x 0.8 x 0.4  cm.  The specimen is bisected and entirely submitted in one cassette.  JXR    C: Received in formalin, labeled with the patient's name and hospital number  and \"B level X lymph node\", are multiple possible lymph nodes aggregating to  0.9 x 0.4 x 0.4 cm.  The specimen is submitted in toto in 1 cassette.  JXR       D: Received in formalin, labeled with the patient's name and hospital number  and \"C level X lymph node #2\", are multiple possible lymph nodes aggregating to  1.1 x 0.5 x 0.4 cm.  The specimen is submitted in toto in 1 cassette.  JXR       E: Received in formalin, labeled with the patient's name and hospital number  and \"D level V lymph node\", are multiple possible lymph nodes aggregating to  1.4 x 0.6 x 0.4 cm.  The specimen is submitted in toto in 1 cassette.  JXR       F: Received in formalin, labeled with the patient's name and hospital number  and \"E level VII lymph node\", are multiple possible lymph nodes aggregating to  1.1 x 0.8 x 0.4 cm.  The specimen is submitted in toto in 1 cassette.  JXR     G: Received in formalin, labeled with the patient's name and number and \"F.  Left upper lobe\", is a left lung lobe, 20 x 12.5 x 3.0 cm, 315 grams. The  pleura is red-gray, mottled heavily with black streaks and glistening. The  pleura shows diffuse fibrous adhesions and palpable firm areas. The pleura  overlying the firm areas is " inked black. The bronchial resection margin is  identified, 3.5 cm in length and 0.2 cm in diameter. Vascular margins are  identified.  The artery measures 2.0 cm in length, and 0.1 cm in diameter.  The  vein measures 1.5 cm in length, and 0.1 cm in diameter.  A staple line measures  12.8 cm in length. After removal of the staple line, the parenchyma is inked  blue. The bronchus is opened.     The specimen is sectioned.  The cut surface reveals an irregular, poorly  demarcated, dark brown-red firm area measuring 2.0 x 2.0 x 1.5 cm. The mass do  not appear to invade the pleura, but abuts it, and is 3.0 cm from the bronchial  margin, 4.0 cm from the vascular margin, and abuts the staple line. A second  smaller tan-red firm area is identified, measuring 1.0 x 0.8 x 0.4 cm, which is  abutting the pleura, is 3.5 cm from the bronchial margin, 5.0 cm from the  vascular margin, 1.3 cm from the staple line, and 8.5 cm from mass #1. The  remainder of the cut surface is red-pink and crepitant. Opening of the  pulmonary vessels does not reveal gross invasion by neoplasm. Peribronchial  lymph nodes are identified. Representative sections are submitted in 14  shay...    FINALINTERP  06/10/2024     A. LUNG, RIGHT UPPER LOBE, NODULE, FINE NEEDLE ASPIRATION, CYTOLOGY AND CELL BLOCK:  --  Non diagnostic.  Specimen consists of benign lung elements and acute and chronic inflammatory cells.  --  Correlate with concurrent biopsy surgical pathology report ([id number]).     B. LUNG, RIGHT UPPER LOBE, NODULE, BRONCHIAL BRUSH, CYTOLOGY AND CELL BLOCK:  --  Non diagnostic.  Specimen consists of benign lung elements.  --  Correlate with concurrent biopsy surgical pathology report ([id number]).      C. LYMPH NODE, 4 L PULMONARY, FINE NEEDLE ASPIRATION, CYTOLOGY AND CELL BLOCK:  --  No malignant cells identified.  --  Lymphoid sample.     D. LYMPH NODE, 4 R PULMONARY, FINE NEEDLE ASPIRATION, CYTOLOGY AND CELL BLOCK:  --  No malignant cells  identified.  --  Lymphoid sample.      E. LYMPH NODE,  7 PULMONARY, FINE NEEDLE ASPIRATION, CYTOLOGY AND CELL BLOCK:  --  No malignant cells identified.  --  Lymphoid sample.      F. LYMPH NODE, 11 Rs PULMONARY, FINE NEEDLE ASPIRATION, CYTOLOGY AND CELL BLOCK:  --  No malignant cells identified.  --  Lymphoid sample.      G. LYMPH NODE, 11 Ri PULMONARY, FINE NEEDLE ASPIRATION, CYTOLOGY AND CELL BLOCK:  --  No malignant cells identified.  --  Lymphoid sample.        COMDX  06/10/2024     Specific morphologic findings that would cause the presence of a lung nodule are not seen in this specimen. Clinical and radiologic correlation is recommended to determine if these findings are representative of the targeted lesion. Repeat biopsy may be considered, if clinically indicated.          Assessment/Plan      Brianne Mallory is a 79 y.o. female here for recommendations and to establish care for NSCLC.    # NSCLC  - history of adenocarcinoma s/p GERTRUDE lobectomy 1/21/21, pT1c pN0  - now with new squamous cell ca, s/p RUL lobectomy 10/23/24, pT3 pN0  - discussed that given the size of the new primary lung cancer of 5.5 cm, recommend adjuvant chemotherapy +/- immunotherapy afterwards to improve progression free survival and overall survival   - she has been previously uninterested in chemotherapy, and continues to be uninterested in systemic therapy if possible  - she will continue surveillance with Dr. Pacheco, and RTC PRN      Karlee Nuñez MD

## 2024-12-23 ENCOUNTER — APPOINTMENT (OUTPATIENT)
Dept: CARDIOLOGY | Facility: HOSPITAL | Age: 79
End: 2024-12-23
Payer: MEDICARE

## 2024-12-23 ENCOUNTER — APPOINTMENT (OUTPATIENT)
Dept: RADIOLOGY | Facility: HOSPITAL | Age: 79
End: 2024-12-23
Payer: MEDICARE

## 2024-12-23 ENCOUNTER — HOSPITAL ENCOUNTER (EMERGENCY)
Facility: HOSPITAL | Age: 79
Discharge: HOME | End: 2024-12-23
Attending: STUDENT IN AN ORGANIZED HEALTH CARE EDUCATION/TRAINING PROGRAM
Payer: MEDICARE

## 2024-12-23 VITALS
OXYGEN SATURATION: 96 % | SYSTOLIC BLOOD PRESSURE: 168 MMHG | RESPIRATION RATE: 16 BRPM | HEART RATE: 68 BPM | HEIGHT: 68 IN | BODY MASS INDEX: 32.89 KG/M2 | TEMPERATURE: 97.2 F | DIASTOLIC BLOOD PRESSURE: 74 MMHG | WEIGHT: 217 LBS

## 2024-12-23 DIAGNOSIS — J18.9 PNEUMONIA OF RIGHT MIDDLE LOBE DUE TO INFECTIOUS ORGANISM: Primary | ICD-10-CM

## 2024-12-23 DIAGNOSIS — R91.8 LUNG MASS: ICD-10-CM

## 2024-12-23 LAB
ALBUMIN SERPL BCP-MCNC: 4.1 G/DL (ref 3.4–5)
ALP SERPL-CCNC: 53 U/L (ref 33–136)
ALT SERPL W P-5'-P-CCNC: 4 U/L (ref 7–45)
ANION GAP SERPL CALC-SCNC: 10 MMOL/L (ref 10–20)
AST SERPL W P-5'-P-CCNC: 13 U/L (ref 9–39)
BASOPHILS # BLD AUTO: 0.01 X10*3/UL (ref 0–0.1)
BASOPHILS NFR BLD AUTO: 0.1 %
BILIRUB SERPL-MCNC: 0.2 MG/DL (ref 0–1.2)
BUN SERPL-MCNC: 17 MG/DL (ref 6–23)
CALCIUM SERPL-MCNC: 9.4 MG/DL (ref 8.6–10.3)
CARDIAC TROPONIN I PNL SERPL HS: 4 NG/L (ref 0–13)
CARDIAC TROPONIN I PNL SERPL HS: 4 NG/L (ref 0–13)
CHLORIDE SERPL-SCNC: 103 MMOL/L (ref 98–107)
CO2 SERPL-SCNC: 27 MMOL/L (ref 21–32)
CREAT SERPL-MCNC: 0.62 MG/DL (ref 0.5–1.05)
EGFRCR SERPLBLD CKD-EPI 2021: 90 ML/MIN/1.73M*2
EOSINOPHIL # BLD AUTO: 0.1 X10*3/UL (ref 0–0.4)
EOSINOPHIL NFR BLD AUTO: 1.1 %
ERYTHROCYTE [DISTWIDTH] IN BLOOD BY AUTOMATED COUNT: 12.8 % (ref 11.5–14.5)
FLUAV RNA RESP QL NAA+PROBE: NOT DETECTED
FLUBV RNA RESP QL NAA+PROBE: NOT DETECTED
GLUCOSE SERPL-MCNC: 93 MG/DL (ref 74–99)
HCT VFR BLD AUTO: 40.3 % (ref 36–46)
HGB BLD-MCNC: 13.1 G/DL (ref 12–16)
IMM GRANULOCYTES # BLD AUTO: 0.04 X10*3/UL (ref 0–0.5)
IMM GRANULOCYTES NFR BLD AUTO: 0.4 % (ref 0–0.9)
LYMPHOCYTES # BLD AUTO: 1.03 X10*3/UL (ref 0.8–3)
LYMPHOCYTES NFR BLD AUTO: 11.3 %
MCH RBC QN AUTO: 30.4 PG (ref 26–34)
MCHC RBC AUTO-ENTMCNC: 32.5 G/DL (ref 32–36)
MCV RBC AUTO: 94 FL (ref 80–100)
MONOCYTES # BLD AUTO: 0.59 X10*3/UL (ref 0.05–0.8)
MONOCYTES NFR BLD AUTO: 6.5 %
NEUTROPHILS # BLD AUTO: 7.37 X10*3/UL (ref 1.6–5.5)
NEUTROPHILS NFR BLD AUTO: 80.6 %
NRBC BLD-RTO: 0 /100 WBCS (ref 0–0)
PLATELET # BLD AUTO: 361 X10*3/UL (ref 150–450)
POTASSIUM SERPL-SCNC: 3.9 MMOL/L (ref 3.5–5.3)
PROT SERPL-MCNC: 7.1 G/DL (ref 6.4–8.2)
RBC # BLD AUTO: 4.31 X10*6/UL (ref 4–5.2)
SARS-COV-2 RNA RESP QL NAA+PROBE: NOT DETECTED
SODIUM SERPL-SCNC: 136 MMOL/L (ref 136–145)
WBC # BLD AUTO: 9.1 X10*3/UL (ref 4.4–11.3)

## 2024-12-23 PROCEDURE — 99285 EMERGENCY DEPT VISIT HI MDM: CPT | Mod: 25 | Performed by: STUDENT IN AN ORGANIZED HEALTH CARE EDUCATION/TRAINING PROGRAM

## 2024-12-23 PROCEDURE — 84484 ASSAY OF TROPONIN QUANT: CPT | Performed by: STUDENT IN AN ORGANIZED HEALTH CARE EDUCATION/TRAINING PROGRAM

## 2024-12-23 PROCEDURE — 93005 ELECTROCARDIOGRAM TRACING: CPT

## 2024-12-23 PROCEDURE — 71275 CT ANGIOGRAPHY CHEST: CPT

## 2024-12-23 PROCEDURE — 71045 X-RAY EXAM CHEST 1 VIEW: CPT

## 2024-12-23 PROCEDURE — 84075 ASSAY ALKALINE PHOSPHATASE: CPT | Performed by: STUDENT IN AN ORGANIZED HEALTH CARE EDUCATION/TRAINING PROGRAM

## 2024-12-23 PROCEDURE — 85025 COMPLETE CBC W/AUTO DIFF WBC: CPT | Performed by: STUDENT IN AN ORGANIZED HEALTH CARE EDUCATION/TRAINING PROGRAM

## 2024-12-23 PROCEDURE — 71275 CT ANGIOGRAPHY CHEST: CPT | Performed by: RADIOLOGY

## 2024-12-23 PROCEDURE — 71045 X-RAY EXAM CHEST 1 VIEW: CPT | Performed by: RADIOLOGY

## 2024-12-23 PROCEDURE — 2550000001 HC RX 255 CONTRASTS: Performed by: STUDENT IN AN ORGANIZED HEALTH CARE EDUCATION/TRAINING PROGRAM

## 2024-12-23 PROCEDURE — 2500000001 HC RX 250 WO HCPCS SELF ADMINISTERED DRUGS (ALT 637 FOR MEDICARE OP): Performed by: STUDENT IN AN ORGANIZED HEALTH CARE EDUCATION/TRAINING PROGRAM

## 2024-12-23 PROCEDURE — 87636 SARSCOV2 & INF A&B AMP PRB: CPT | Performed by: STUDENT IN AN ORGANIZED HEALTH CARE EDUCATION/TRAINING PROGRAM

## 2024-12-23 RX ORDER — DOXYCYCLINE HYCLATE 100 MG
100 TABLET ORAL ONCE
Status: COMPLETED | OUTPATIENT
Start: 2024-12-23 | End: 2024-12-23

## 2024-12-23 RX ORDER — DOXYCYCLINE 100 MG/1
100 TABLET ORAL 2 TIMES DAILY
Qty: 14 TABLET | Refills: 0 | Status: SHIPPED | OUTPATIENT
Start: 2024-12-23 | End: 2024-12-30

## 2024-12-23 RX ORDER — CEFUROXIME AXETIL 250 MG/1
500 TABLET ORAL ONCE
Status: COMPLETED | OUTPATIENT
Start: 2024-12-23 | End: 2024-12-23

## 2024-12-23 RX ORDER — CEFUROXIME AXETIL 500 MG/1
500 TABLET ORAL 2 TIMES DAILY
Qty: 20 TABLET | Refills: 0 | Status: SHIPPED | OUTPATIENT
Start: 2024-12-23 | End: 2024-12-30

## 2024-12-23 ASSESSMENT — PAIN SCALES - GENERAL
PAINLEVEL_OUTOF10: 0 - NO PAIN
PAINLEVEL_OUTOF10: 0 - NO PAIN

## 2024-12-23 ASSESSMENT — LIFESTYLE VARIABLES
HAVE PEOPLE ANNOYED YOU BY CRITICIZING YOUR DRINKING: NO
TOTAL SCORE: 0
EVER HAD A DRINK FIRST THING IN THE MORNING TO STEADY YOUR NERVES TO GET RID OF A HANGOVER: NO
EVER FELT BAD OR GUILTY ABOUT YOUR DRINKING: NO
HAVE YOU EVER FELT YOU SHOULD CUT DOWN ON YOUR DRINKING: NO

## 2024-12-23 ASSESSMENT — PAIN - FUNCTIONAL ASSESSMENT: PAIN_FUNCTIONAL_ASSESSMENT: 0-10

## 2024-12-23 NOTE — ED PROVIDER NOTES
HPI   Chief Complaint   Patient presents with    Flu Symptoms     Flu symptoms x since Friday. C/O SOB, non productive cough and general body aches. Pt has hx of bilateral upper lobe ectomy for lung cancer.        Patient is an 79-year-old female with history of lung cancer status post bilateral upper lobectomies, DVT not on anticoagulation, hyperlipidemia, hypertension who presents for multiple medical complaints.  Patient states for the past 4 days she has been having cough, runny nose, body aches, shortness of breath.  No chest pain, fevers, chills, nausea, vomiting, diarrhea, abdominal pain.  Patient is not on anticoagulation currently.  Had a negative COVID test at outside hospital in the last few days.              Patient History   Past Medical History:   Diagnosis Date    Anxiety     Cataract     Clotting disorder (Multi)     COPD (chronic obstructive pulmonary disease) (Multi)     Coronary artery calcification seen on CT scan     GERD (gastroesophageal reflux disease)     History of blood transfusion     History of cancer of upper lobe bronchus or lung     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 with completion lobectomy on 1/21/21    Hyperlipidemia     Hypertension     Hypothyroidism     Mass of upper lobe of right lung     RUL mass measuring currently 4.8 x 3.3 cm    Sprain of ankle, left 09/11/2024     Past Surgical History:   Procedure Laterality Date    BRONCHOSCOPY      CATARACT EXTRACTION      JOINT REPLACEMENT      KNEE ARTHROPLASTY      right an dleft    OTHER SURGICAL HISTORY  12/28/2020    Biopsy    OTHER SURGICAL HISTORY Left 01/21/2021    GERTRUDE Lung lobectomy     Family History   Problem Relation Name Age of Onset    Hypertension Mother      Heart attack Father      Lung cancer Maternal Grandmother      Diabetes Paternal Grandmother       Social History     Tobacco Use    Smoking status: Former     Current packs/day: 1.00     Average packs/day: 1 pack/day for 5.6 years  (5.6 ttl pk-yrs)     Types: Cigarettes     Start date: 5/1/2019     Passive exposure: Past    Smokeless tobacco: Never   Vaping Use    Vaping status: Never Used   Substance Use Topics    Alcohol use: Not Currently     Comment: very rare    Drug use: Never       Physical Exam   ED Triage Vitals [12/23/24 1241]   Temperature Heart Rate Respirations BP   36.2 °C (97.2 °F) 64 16 --      Pulse Ox Temp Source Heart Rate Source Patient Position   96 % Temporal -- Sitting      BP Location FiO2 (%)     Right arm --       Physical Exam  Constitutional:       General: She is not in acute distress.  HENT:      Head: Normocephalic.   Eyes:      Extraocular Movements: Extraocular movements intact.      Conjunctiva/sclera: Conjunctivae normal.      Pupils: Pupils are equal, round, and reactive to light.   Cardiovascular:      Rate and Rhythm: Normal rate and regular rhythm.      Pulses: Normal pulses.      Heart sounds: Normal heart sounds.   Pulmonary:      Effort: Pulmonary effort is normal.      Breath sounds: Normal breath sounds.   Abdominal:      General: There is no distension.      Palpations: Abdomen is soft. There is no mass.      Tenderness: There is no abdominal tenderness. There is no guarding.   Musculoskeletal:         General: No deformity.      Cervical back: Normal range of motion and neck supple.      Right lower leg: No edema.      Left lower leg: No edema.   Skin:     General: Skin is warm and dry.      Findings: No lesion or rash.   Neurological:      General: No focal deficit present.      Mental Status: She is alert and oriented to person, place, and time. Mental status is at baseline.      Cranial Nerves: No cranial nerve deficit.      Sensory: No sensory deficit.      Motor: No weakness.   Psychiatric:         Mood and Affect: Mood normal.           ED Course & MDM   Diagnoses as of 12/23/24 1611   Pneumonia of right middle lobe due to infectious organism   Lung mass                 No data recorded      Carlos Enrique Coma Scale Score: 15 (12/23/24 1243 : Danya Summers RN)                           Medical Decision Making  EKG on my interpretation: Rate 60, rhythm regular, axis normal, , , QTc 462, T waves: Unremarkable, ST segments: No elevations or depressions, deprecation: Normal sinus rhythm, right bundle branch block, no STEMI, bifascicular block noted on EKG from October 9, 2024.    EKG on my interpretation: Rate 67, rhythm irregular, axis leftward, , , QTc 477, T waves: biphasic in aVF, ST segments: No elevations or depressions, deprecation: Sinus rhythm with PVCs, no STEMI    Patient is an 79-year-old female with above-stated past medical history who presents for multiple medical complaints.  Patient's EKGs are nonischemic, her troponins are negative x 2, low suspicion for ACS.  CMP is gross unremarkable.  CBC shows no leukocytosis and patient is afebrile which lowers my suspicion for sepsis.  Influenza and COVID are negative.  CT angio was ordered given patient's history and symptoms.  There is a consolidation and suspicious for possible pneumonia.  Patient was placed on cefuroxime and doxycycline, she was given a dose in the emergency department and a prescription for the same medications.  She was advised of her lymph nodes, possible cancer and advised to follow-up closely with her doctor, today if possible.  She stated understanding agreement.  CT scan did not show signs of Boerhaave's, cardiac tamponade, pneumothorax.  Low suspicion for dissection.  Patient is clinically well-appearing nontoxic.  Her curb 65 score makes her low risk for outpatient management.  I discussed strict return precautions with her, she stated understanding agreement.  I believe patient's symptoms likely due to her pneumonia. Patient was discharged home in stable condition.    Disclaimer: This note was dictated using speech recognition software. Minor errors in transcription may be present. Please call if  questions.     Emmanuel Wall MD  [facility] Emergency Medicine  Contact on [employer]        Problems Addressed:  Lung mass: acute illness or injury  Pneumonia of right middle lobe due to infectious organism: acute illness or injury    Amount and/or Complexity of Data Reviewed  Labs: ordered.  Radiology: ordered.  ECG/medicine tests: ordered and independent interpretation performed.        Procedure  Procedures     Emmanuel Wall MD  12/23/24 6391

## 2024-12-27 LAB
ATRIAL RATE: 60 BPM
ATRIAL RATE: 67 BPM
P AXIS: 93 DEGREES
P AXIS: 97 DEGREES
P OFFSET: 163 MS
P OFFSET: 163 MS
P ONSET: 125 MS
P ONSET: 126 MS
PR INTERVAL: 176 MS
PR INTERVAL: 180 MS
Q ONSET: 214 MS
Q ONSET: 215 MS
QRS COUNT: 10 BEATS
QRS COUNT: 12 BEATS
QRS DURATION: 128 MS
QRS DURATION: 134 MS
QT INTERVAL: 452 MS
QT INTERVAL: 462 MS
QTC CALCULATION(BAZETT): 462 MS
QTC CALCULATION(BAZETT): 477 MS
QTC FREDERICIA: 462 MS
QTC FREDERICIA: 469 MS
R AXIS: -33 DEGREES
R AXIS: -35 DEGREES
T AXIS: -22 DEGREES
T AXIS: 3 DEGREES
T OFFSET: 440 MS
T OFFSET: 446 MS
VENTRICULAR RATE: 60 BPM
VENTRICULAR RATE: 67 BPM

## 2025-01-23 ENCOUNTER — HOSPITAL ENCOUNTER (OUTPATIENT)
Dept: RADIOLOGY | Facility: HOSPITAL | Age: 80
Discharge: HOME | End: 2025-01-23
Payer: MEDICARE

## 2025-01-23 DIAGNOSIS — C34.90 MALIGNANT NEOPLASM OF LUNG, UNSPECIFIED LATERALITY, UNSPECIFIED PART OF LUNG (MULTI): ICD-10-CM

## 2025-01-23 PROCEDURE — 71250 CT THORAX DX C-: CPT | Performed by: RADIOLOGY

## 2025-01-23 PROCEDURE — 71250 CT THORAX DX C-: CPT

## 2025-01-28 ENCOUNTER — TELEMEDICINE (OUTPATIENT)
Dept: SURGERY | Facility: CLINIC | Age: 80
End: 2025-01-28
Payer: MEDICARE

## 2025-01-28 DIAGNOSIS — C34.12 MALIGNANT NEOPLASM OF UPPER LOBE OF LEFT LUNG (MULTI): Primary | ICD-10-CM

## 2025-01-28 DIAGNOSIS — C34.90 MALIGNANT NEOPLASM OF LUNG, UNSPECIFIED LATERALITY, UNSPECIFIED PART OF LUNG (MULTI): ICD-10-CM

## 2025-01-28 PROCEDURE — 1160F RVW MEDS BY RX/DR IN RCRD: CPT | Performed by: STUDENT IN AN ORGANIZED HEALTH CARE EDUCATION/TRAINING PROGRAM

## 2025-01-28 PROCEDURE — 1159F MED LIST DOCD IN RCRD: CPT | Performed by: STUDENT IN AN ORGANIZED HEALTH CARE EDUCATION/TRAINING PROGRAM

## 2025-01-28 PROCEDURE — 99214 OFFICE O/P EST MOD 30 MIN: CPT | Performed by: STUDENT IN AN ORGANIZED HEALTH CARE EDUCATION/TRAINING PROGRAM

## 2025-01-28 NOTE — PROGRESS NOTES
C/C:  Surveillance visit    Virtual or Telephone Consent    An interactive audio and video telecommunication system which permits real time communications between the patient (at the originating site) and provider (at the distant site) was utilized to provide this telehealth service.   Verbal consent was requested and obtained from Brianne Mallory on this date, 01/28/25 for a telehealth visit.       History Of Present Illness  Brianne Mlalory is a 79 y.o. female h/o Lt upper lobe pT1cN0 NSCLC s/p lobectomy by Dr. Martinez on 1/21/21. She presents today with her daughter for virtual visit with surveillance imaging after her more recent surgery: a robotic right upper lobectomy on 10/23/24 for a G3 5.5 cm pT3N0 squamous cell. She did see Medical Oncology post-op but ultimately declined adjuvant therapy.    Since that time has been doing ok. Unfortunately did develop pneumonia in December and feels she is starting to get URI sxs again.      By way of review: patient was previously seen by my partner, Dr. Martinez - she presented as a 75yoF who was found to have a left upper lobe nodule that on bronch was biopsy + NSCLC. She reported no significant resp sxs at that time including no cough, hemoptysis or fever. This nodule was found when she was admitted to an OSH with a PE. She was treated with anticoagulation. She is now s/p VATS converted to open VALERIE for a GERTRUDE wedge with completion lobectomy for pT1cN0 NSCLC on 1/21/21.    During surveillance was noted to have a very slowly growing RUL lesion. She had seen Dr. Sarkar, Rad Onc, and had multiple biopsies which showed no malignancy (organized pneumonia) however despite multiple rounds of abx the lesion has continued to grow concerning for underlying malignant component. Eventually this prompted surgical resection.    Past Medical History  She has a past medical history of Anxiety, Cataract, Clotting disorder (Multi), COPD (chronic obstructive pulmonary disease) (Multi),  Coronary artery calcification seen on CT scan, GERD (gastroesophageal reflux disease), History of blood transfusion, History of cancer of upper lobe bronchus or lung, Hyperlipidemia, Hypertension, Hypothyroidism, Mass of upper lobe of right lung, and Sprain of ankle, left (09/11/2024).    Social History  She reports that she has quit smoking. Her smoking use included cigarettes. She started smoking about 5 years ago. She has a 5.7 pack-year smoking history. She has been exposed to tobacco smoke. She has never used smokeless tobacco. She reports that she does not currently use alcohol. She reports that she does not use drugs.      Medications    Current Outpatient Medications:     acetaminophen (Tylenol) 325 mg tablet, Take 2 tablets (650 mg) by mouth every 4 hours., Disp: , Rfl:     calcium citrate-vitamin D2 250 mg-2.5 mcg (100 unit) tablet, Take 1 tablet by mouth once daily., Disp: , Rfl:     cyanocobalamin (Vitamin B-12) 250 mcg tablet, Take 1 tablet (250 mcg) by mouth once daily., Disp: , Rfl:     levothyroxine (Synthroid, Levoxyl) 88 mcg tablet, Take 1 tablet (88 mcg) by mouth early in the morning.., Disp: , Rfl:     lidocaine 4 % patch, Place 2 patches over 12 hours on the skin once daily. Remove & discard patch within 12 hours or as directed by MD. (Patient not taking: Reported on 12/2/2024), Disp: , Rfl:     pantoprazole (ProtoNix) 40 mg EC tablet, Take 1 tablet (40 mg) by mouth once daily in the morning. Take before meals. Do not crush, chew, or split. (Patient not taking: Reported on 10/22/2024), Disp: 30 tablet, Rfl: 0    sertraline (Zoloft) 25 mg tablet, Take 1 tablet (25 mg) by mouth once daily., Disp: , Rfl:     simvastatin (Zocor) 40 mg tablet, Take 1 tablet (40 mg) by mouth once daily at bedtime., Disp: , Rfl:     traMADol (Ultram) 50 mg tablet, Take 1 tablet (50 mg) by mouth every 6 hours if needed (pain). (Patient not taking: Reported on 12/2/2024), Disp: 15 tablet, Rfl: 0    Allergies  Aspirin,  Ibuprofen, Nickel, Sulfa (sulfonamide antibiotics), and Penicillins    Review of Systems:  Review of Systems   Constitutional: No fevers, chills, unexpected weight change  HENT: No sore throat, congestion, or nasal drainage  Eyes: No visual changes or eye itching  Respiratory:  see HPI. No cough, worsening dyspnea, wheezing  Cardiac: No chest pain, palpitations, or lower extremity edema  Gastrointestinal: No nausea, vomiting, diarrhea. No abdominal pain  Genitourinary: No dysuria or hematuria  Musculoskeletal: No back pain. No significant myalgias or arthralgias  Neurologic: No headaches, dizziness, or seizures.  Hematologic: No east bleeding or bruising.  Psychiatric: No anxiety or depression.    Physical Exam:  Physical Exam  There were no vitals taken for this visit.  Constitutional:       General: Patient is not in acute distress.     Appearance: Normal appearance; not ill-appearing.   HENT:      Head: Normocephalic.     Skin:     General: Skin is warm and dry.   Neurological:      General: No focal deficit present.      Mental Status: Patient is alert and oriented to person, place, and time.   Psychiatric:         Mood and Affect: Mood normal.         Relevant Results:     Pathology:    Surgical Pathology Exam: [id number]  Order: [id number]   Collected 10/23/2024 13:02       Status: Final result       Visible to patient: Yes (seen)       Dx: Mass of upper lobe of right lung    0 Result Notes       1 Follow-up Encounter      Component    FINAL DIAGNOSIS   A. LYMPH NODE, LEVEL 9, EXCISION:  - One lymph node with no evidence of carcinoma (0/1).     B. LYMPH NODE, LEVEL 8, EXCISION:  - One lymph node with no evidence of carcinoma (0/1).     C. LYMPH NODE. LEVEL 7, EXCISION:  - Fragments of lymph node with no evidence of carcinoma (0/1).     D. LYMPH NODE, LEVEL 11, EXCISION:  - Fragments of lymph node with no evidence of carcinoma (0/1).     E. LYMPH NODE, LEVEL 12, EXCISION:  - One lymph node with no evidence of  carcinoma (0/1).     F. 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 See cancer summary report and note.   - Margins of resection are negative for carcinoma, see note.   - Two lymph nodes with no evidence of carcinoma (0/2).  - Background lung with post-obstructive changes, atypical adenomatous hyperplasia, and emphysematous changes.     Note: In the bronchial en face margin, the bronchial lumen is negative for carcinoma, however squamous cell carcinoma is seen involving the soft tissue around the bronchial wall, and shows cautery artifact.      AFB and GMS are performed on an area of post-obstructive changes with extensive inflammation, and the stains are negative for acid-fast and fungal organisms, respectively.      G. LYMPH NODE, LEVEL 10, EXCISION:  - Fragments of lymph node with no evidence of carcinoma (0/1).  - Detached fragments of carcinoma are seen, admixed with blood, and do not involve the lymphoid tissue.      H. LYMPH NODE, LEVEL 4, EXCISION:  - Fragments of lymph node with no evidence of carcinoma (0/1).      Electronically signed by Clarisa Higgins DO on 11/11/2024 at 1131        By the signature on this report, the individual or group listed as making the Final Interpretation/Diagnosis certifies that they have reviewed this case.    Resident Review    Alicia Vanegas MD      Case Summary Report   LUNG   8th Edition - Protocol posted: 9/21/2022LUNG - All Specimens  SPECIMEN   Procedure  Lobectomy   Specimen Laterality  Right   TUMOR   Tumor Focality  Single focus   Tumor Site  Upper lobe of lung   Tumor Size     Total Tumor Size (size of entire tumor)  Greatest Dimension (Centimeters): 5.5 cm   Histologic Type  Invasive squamous cell carcinoma, keratinizing   Histologic Grade  G3, poorly differentiated   Visceral Pleura Invasion  Not identified   Direct Invasion of Adjacent Structures  Not applicable (no adjacent  "structures present)   Treatment Effect  No known presurgical therapy   Lymphovascular Invasion  Not identified   MARGINS   Margin Status for Invasive Carcinoma  All margins negative for invasive carcinoma   Closest Margin(s) to Invasive Carcinoma  Bronchial     Parenchymal   Distance from Invasive Carcinoma to Closest Margin  0.2 cm   Margin Status for Non-Invasive Tumor  Not applicable   REGIONAL LYMPH NODES   Lymph Node(s) from Prior Procedures  Included   Prior Lymph Node Procedure(s) Included  [id number]   Regional Lymph Node Status  All regional lymph nodes negative for tumor   Number of Lymph Nodes Examined  At least: 13   John Site(s) Examined  4R: Lower paratracheal     8R: Para-esophageal (below jose miguel)     9R: Pulmonary ligament     10R: Hilar     11R: Interlobar     12R: Lobar     7: Subcarinal     4L: Lower paratracheal   PATHOLOGIC STAGE CLASSIFICATION (pTNM, AJCC 8th Edition)   Reporting of pT, pN, and (when applicable) pM categories is based on information available to the pathologist at the time the report is issued. As per the AJCC (Chapter 1, 8th Ed.) it is the managing physician’s responsibility to establish the final pathologic stage based upon all pertinent information, including but potentially not limited to this pathology report.   pT Category  pT3   pN Category  pN0   ADDITIONAL FINDINGS   Additional Findings  Emphysema               Imaging:    Pulmonary Functions Testing Results:    No results found for: \"FEV1\", \"FVC\", \"KNM0QTM\", \"TLC\", \"DLCO\"    CT chest personally reviewed     Assessment/Plan   Problem List Items Addressed This Visit             ICD-10-CM    Malignant neoplasm of upper lobe of left lung (Multi) - Primary C34.12     Other Visit Diagnoses         Codes    Malignant neoplasm of lung, unspecified laterality, unspecified part of lung (Multi)     C34.90    Relevant Orders    CT chest wo IV contrast            Brianne Mallory is a 79 y.o. female h/o Lt upper lobe pT1cN0 NSCLC " s/p lobectomy by Dr. Martinez on 1/21/21. More recently s/p a robotic right upper lobectomy on 10/23/24 for a G3 5.5 cm pT3N0 squamous cell. Declined adjuvant therapy. First surveillance CT chest looks good (and compared to Dec 2024 with recent pneumonia). Will plan for repeat CT chest in 3 months + virtual visit at that time.       Gissell Pacheco, DO  Thoracic & Esophageal Surgery

## 2025-01-28 NOTE — PATIENT INSTRUCTIONS
"You are scheduled for a Virtual Follow up in 3 months with CT chest prior. See appointment below in \"What's Next\".  Call our office with any questions. [phone]  "

## 2025-04-25 ENCOUNTER — APPOINTMENT (OUTPATIENT)
Dept: RADIOLOGY | Facility: HOSPITAL | Age: 80
End: 2025-04-25
Payer: MEDICARE

## 2025-04-29 ENCOUNTER — APPOINTMENT (OUTPATIENT)
Dept: SURGERY | Facility: CLINIC | Age: 80
End: 2025-04-29
Payer: MEDICARE

## 2025-05-11 ENCOUNTER — APPOINTMENT (OUTPATIENT)
Dept: RADIOLOGY | Facility: HOSPITAL | Age: 80
DRG: 175 | End: 2025-05-11
Payer: MEDICARE

## 2025-05-11 ENCOUNTER — HOSPITAL ENCOUNTER (INPATIENT)
Facility: HOSPITAL | Age: 80
LOS: 4 days | Discharge: HOSPICE | DRG: 175 | End: 2025-05-15
Attending: EMERGENCY MEDICINE | Admitting: STUDENT IN AN ORGANIZED HEALTH CARE EDUCATION/TRAINING PROGRAM
Payer: MEDICARE

## 2025-05-11 ENCOUNTER — APPOINTMENT (OUTPATIENT)
Dept: CARDIOLOGY | Facility: HOSPITAL | Age: 80
DRG: 175 | End: 2025-05-11
Payer: MEDICARE

## 2025-05-11 DIAGNOSIS — E87.1 HYPONATREMIA: ICD-10-CM

## 2025-05-11 DIAGNOSIS — E83.42 HYPOMAGNESEMIA: ICD-10-CM

## 2025-05-11 DIAGNOSIS — Z85.29 HISTORY OF MALIGNANT NEOPLASM OF THORACIC CAVITY STRUCTURE: ICD-10-CM

## 2025-05-11 DIAGNOSIS — D64.9 ANEMIA, UNSPECIFIED TYPE: ICD-10-CM

## 2025-05-11 DIAGNOSIS — R09.02 HYPOXIA: ICD-10-CM

## 2025-05-11 DIAGNOSIS — I26.99 OTHER ACUTE PULMONARY EMBOLISM WITHOUT ACUTE COR PULMONALE: ICD-10-CM

## 2025-05-11 DIAGNOSIS — I26.99 PULMONARY EMBOLISM ON LEFT (MULTI): Primary | ICD-10-CM

## 2025-05-11 DIAGNOSIS — C34.90: ICD-10-CM

## 2025-05-11 DIAGNOSIS — E87.1 ACUTE HYPONATREMIA: ICD-10-CM

## 2025-05-11 DIAGNOSIS — D72.829 LEUKOCYTOSIS, UNSPECIFIED TYPE: ICD-10-CM

## 2025-05-11 LAB
ALBUMIN SERPL BCP-MCNC: 3.4 G/DL (ref 3.4–5)
ALP SERPL-CCNC: 90 U/L (ref 33–136)
ALT SERPL W P-5'-P-CCNC: 11 U/L (ref 7–45)
ANION GAP SERPL CALC-SCNC: 14 MMOL/L (ref 10–20)
APPEARANCE UR: CLEAR
ARTERIAL PATENCY WRIST A: POSITIVE
ARTERIAL PATENCY WRIST A: YES
AST SERPL W P-5'-P-CCNC: 19 U/L (ref 9–39)
BASE EXCESS BLDA CALC-SCNC: -0.4 MMOL/L (ref -2–3)
BASOPHILS # BLD AUTO: 0.06 X10*3/UL (ref 0–0.1)
BASOPHILS NFR BLD AUTO: 0.3 %
BILIRUB SERPL-MCNC: 0.4 MG/DL (ref 0–1.2)
BILIRUB UR STRIP.AUTO-MCNC: NEGATIVE MG/DL
BNP SERPL-MCNC: 83 PG/ML (ref 0–99)
BODY TEMPERATURE: ABNORMAL
BUN SERPL-MCNC: 18 MG/DL (ref 6–23)
CALCIUM SERPL-MCNC: 10.9 MG/DL (ref 8.6–10.3)
CARDIAC TROPONIN I PNL SERPL HS: 6 NG/L (ref 0–13)
CARDIAC TROPONIN I PNL SERPL HS: 7 NG/L (ref 0–13)
CHLORIDE SERPL-SCNC: 94 MMOL/L (ref 98–107)
CO2 SERPL-SCNC: 23 MMOL/L (ref 21–32)
COLOR UR: YELLOW
CREAT SERPL-MCNC: 0.71 MG/DL (ref 0.5–1.05)
EGFRCR SERPLBLD CKD-EPI 2021: 85 ML/MIN/1.73M*2
EOSINOPHIL # BLD AUTO: 0.17 X10*3/UL (ref 0–0.4)
EOSINOPHIL NFR BLD AUTO: 0.7 %
ERYTHROCYTE [DISTWIDTH] IN BLOOD BY AUTOMATED COUNT: 15.4 % (ref 11.5–14.5)
FLUAV RNA RESP QL NAA+PROBE: NOT DETECTED
FLUBV RNA RESP QL NAA+PROBE: NOT DETECTED
GLUCOSE SERPL-MCNC: 110 MG/DL (ref 74–99)
GLUCOSE UR STRIP.AUTO-MCNC: NORMAL MG/DL
HCO3 BLDA-SCNC: 22.9 MMOL/L (ref 22–26)
HCT VFR BLD AUTO: 34.6 % (ref 36–46)
HGB BLD-MCNC: 11.2 G/DL (ref 12–16)
IMM GRANULOCYTES # BLD AUTO: 0.4 X10*3/UL (ref 0–0.5)
IMM GRANULOCYTES NFR BLD AUTO: 1.7 % (ref 0–0.9)
INHALED O2 CONCENTRATION: 28 %
INR PPP: 1.3 (ref 0.9–1.1)
KETONES UR STRIP.AUTO-MCNC: NEGATIVE MG/DL
LACTATE SERPL-SCNC: 1.5 MMOL/L (ref 0.4–2)
LEUKOCYTE ESTERASE UR QL STRIP.AUTO: NEGATIVE
LIPASE SERPL-CCNC: 22 U/L (ref 9–82)
LYMPHOCYTES # BLD AUTO: 0.88 X10*3/UL (ref 0.8–3)
LYMPHOCYTES NFR BLD AUTO: 3.7 %
MAGNESIUM SERPL-MCNC: 1.36 MG/DL (ref 1.6–2.4)
MCH RBC QN AUTO: 27.7 PG (ref 26–34)
MCHC RBC AUTO-ENTMCNC: 32.4 G/DL (ref 32–36)
MCV RBC AUTO: 86 FL (ref 80–100)
MONOCYTES # BLD AUTO: 1.37 X10*3/UL (ref 0.05–0.8)
MONOCYTES NFR BLD AUTO: 5.8 %
NEUTROPHILS # BLD AUTO: 20.71 X10*3/UL (ref 1.6–5.5)
NEUTROPHILS NFR BLD AUTO: 87.8 %
NITRITE UR QL STRIP.AUTO: NEGATIVE
NRBC BLD-RTO: 0 /100 WBCS (ref 0–0)
OXYHGB MFR BLDA: 90.6 % (ref 94–98)
PCO2 BLDA: 33 MM HG (ref 38–42)
PH BLDA: 7.45 PH (ref 7.38–7.42)
PH UR STRIP.AUTO: 5.5 [PH]
PLATELET # BLD AUTO: 431 X10*3/UL (ref 150–450)
PO2 BLDA: 63 MM HG (ref 85–95)
POTASSIUM SERPL-SCNC: 4 MMOL/L (ref 3.5–5.3)
PROT SERPL-MCNC: 7 G/DL (ref 6.4–8.2)
PROT UR STRIP.AUTO-MCNC: NEGATIVE MG/DL
PROTHROMBIN TIME: 14.4 SECONDS (ref 9.8–12.4)
RBC # BLD AUTO: 4.04 X10*6/UL (ref 4–5.2)
RBC # UR STRIP.AUTO: NEGATIVE MG/DL
RSV RNA RESP QL NAA+PROBE: NOT DETECTED
SAO2 % BLDA: 93 % (ref 94–100)
SARS-COV-2 RNA RESP QL NAA+PROBE: NOT DETECTED
SITE OF ARTERIAL PUNCTURE: ABNORMAL
SODIUM SERPL-SCNC: 127 MMOL/L (ref 136–145)
SP GR UR STRIP.AUTO: 1.03
SPECIMEN DRAWN FROM PATIENT: ABNORMAL
UROBILINOGEN UR STRIP.AUTO-MCNC: NORMAL MG/DL
WBC # BLD AUTO: 23.6 X10*3/UL (ref 4.4–11.3)

## 2025-05-11 PROCEDURE — 71045 X-RAY EXAM CHEST 1 VIEW: CPT

## 2025-05-11 PROCEDURE — 80053 COMPREHEN METABOLIC PANEL: CPT | Performed by: EMERGENCY MEDICINE

## 2025-05-11 PROCEDURE — 84484 ASSAY OF TROPONIN QUANT: CPT | Performed by: EMERGENCY MEDICINE

## 2025-05-11 PROCEDURE — 83605 ASSAY OF LACTIC ACID: CPT | Performed by: EMERGENCY MEDICINE

## 2025-05-11 PROCEDURE — 85025 COMPLETE CBC W/AUTO DIFF WBC: CPT | Performed by: EMERGENCY MEDICINE

## 2025-05-11 PROCEDURE — 83690 ASSAY OF LIPASE: CPT | Performed by: EMERGENCY MEDICINE

## 2025-05-11 PROCEDURE — 96365 THER/PROPH/DIAG IV INF INIT: CPT

## 2025-05-11 PROCEDURE — 81003 URINALYSIS AUTO W/O SCOPE: CPT | Performed by: EMERGENCY MEDICINE

## 2025-05-11 PROCEDURE — 36415 COLL VENOUS BLD VENIPUNCTURE: CPT | Performed by: EMERGENCY MEDICINE

## 2025-05-11 PROCEDURE — 2500000005 HC RX 250 GENERAL PHARMACY W/O HCPCS: Performed by: EMERGENCY MEDICINE

## 2025-05-11 PROCEDURE — 2500000004 HC RX 250 GENERAL PHARMACY W/ HCPCS (ALT 636 FOR OP/ED): Mod: JZ | Performed by: EMERGENCY MEDICINE

## 2025-05-11 PROCEDURE — 83880 ASSAY OF NATRIURETIC PEPTIDE: CPT | Performed by: EMERGENCY MEDICINE

## 2025-05-11 PROCEDURE — 99292 CRITICAL CARE ADDL 30 MIN: CPT | Performed by: EMERGENCY MEDICINE

## 2025-05-11 PROCEDURE — 1200000002 HC GENERAL ROOM WITH TELEMETRY DAILY

## 2025-05-11 PROCEDURE — 87637 SARSCOV2&INF A&B&RSV AMP PRB: CPT | Performed by: EMERGENCY MEDICINE

## 2025-05-11 PROCEDURE — 82805 BLOOD GASES W/O2 SATURATION: CPT | Performed by: EMERGENCY MEDICINE

## 2025-05-11 PROCEDURE — 2550000001 HC RX 255 CONTRASTS: Performed by: EMERGENCY MEDICINE

## 2025-05-11 PROCEDURE — 71045 X-RAY EXAM CHEST 1 VIEW: CPT | Performed by: RADIOLOGY

## 2025-05-11 PROCEDURE — 93005 ELECTROCARDIOGRAM TRACING: CPT

## 2025-05-11 PROCEDURE — 99291 CRITICAL CARE FIRST HOUR: CPT | Mod: 25 | Performed by: EMERGENCY MEDICINE

## 2025-05-11 PROCEDURE — 82570 ASSAY OF URINE CREATININE: CPT | Performed by: STUDENT IN AN ORGANIZED HEALTH CARE EDUCATION/TRAINING PROGRAM

## 2025-05-11 PROCEDURE — 83935 ASSAY OF URINE OSMOLALITY: CPT | Mod: ELYLAB | Performed by: STUDENT IN AN ORGANIZED HEALTH CARE EDUCATION/TRAINING PROGRAM

## 2025-05-11 PROCEDURE — 70450 CT HEAD/BRAIN W/O DYE: CPT

## 2025-05-11 PROCEDURE — 83735 ASSAY OF MAGNESIUM: CPT | Performed by: EMERGENCY MEDICINE

## 2025-05-11 PROCEDURE — 99223 1ST HOSP IP/OBS HIGH 75: CPT | Performed by: NURSE PRACTITIONER

## 2025-05-11 PROCEDURE — 87040 BLOOD CULTURE FOR BACTERIA: CPT | Mod: ELYLAB | Performed by: EMERGENCY MEDICINE

## 2025-05-11 PROCEDURE — 70450 CT HEAD/BRAIN W/O DYE: CPT | Performed by: STUDENT IN AN ORGANIZED HEALTH CARE EDUCATION/TRAINING PROGRAM

## 2025-05-11 PROCEDURE — 85610 PROTHROMBIN TIME: CPT | Performed by: EMERGENCY MEDICINE

## 2025-05-11 PROCEDURE — 71275 CT ANGIOGRAPHY CHEST: CPT

## 2025-05-11 PROCEDURE — 74177 CT ABD & PELVIS W/CONTRAST: CPT

## 2025-05-11 PROCEDURE — 96361 HYDRATE IV INFUSION ADD-ON: CPT

## 2025-05-11 RX ORDER — ONDANSETRON 4 MG/1
4 TABLET, FILM COATED ORAL EVERY 8 HOURS PRN
Status: DISCONTINUED | OUTPATIENT
Start: 2025-05-11 | End: 2025-05-15 | Stop reason: HOSPADM

## 2025-05-11 RX ORDER — ONDANSETRON HYDROCHLORIDE 2 MG/ML
4 INJECTION, SOLUTION INTRAVENOUS EVERY 8 HOURS PRN
Status: DISCONTINUED | OUTPATIENT
Start: 2025-05-11 | End: 2025-05-15 | Stop reason: HOSPADM

## 2025-05-11 RX ORDER — FLUTICASONE FUROATE, UMECLIDINIUM BROMIDE AND VILANTEROL TRIFENATATE 200; 62.5; 25 UG/1; UG/1; UG/1
1 POWDER RESPIRATORY (INHALATION) DAILY
COMMUNITY

## 2025-05-11 RX ORDER — ACETAMINOPHEN 160 MG/5ML
650 SOLUTION ORAL EVERY 4 HOURS PRN
Status: DISCONTINUED | OUTPATIENT
Start: 2025-05-11 | End: 2025-05-15 | Stop reason: HOSPADM

## 2025-05-11 RX ORDER — PANTOPRAZOLE SODIUM 40 MG/10ML
40 INJECTION, POWDER, LYOPHILIZED, FOR SOLUTION INTRAVENOUS DAILY
Status: DISCONTINUED | OUTPATIENT
Start: 2025-05-12 | End: 2025-05-15 | Stop reason: HOSPADM

## 2025-05-11 RX ORDER — ACETAMINOPHEN 650 MG/1
650 SUPPOSITORY RECTAL EVERY 4 HOURS PRN
Status: DISCONTINUED | OUTPATIENT
Start: 2025-05-11 | End: 2025-05-15 | Stop reason: HOSPADM

## 2025-05-11 RX ORDER — PANTOPRAZOLE SODIUM 40 MG/1
40 TABLET, DELAYED RELEASE ORAL DAILY
Status: DISCONTINUED | OUTPATIENT
Start: 2025-05-12 | End: 2025-05-15 | Stop reason: HOSPADM

## 2025-05-11 RX ORDER — ACETAMINOPHEN 325 MG/1
650 TABLET ORAL EVERY 4 HOURS PRN
Status: DISCONTINUED | OUTPATIENT
Start: 2025-05-11 | End: 2025-05-15 | Stop reason: HOSPADM

## 2025-05-11 RX ORDER — HEPARIN SODIUM 10000 [USP'U]/100ML
0-4500 INJECTION, SOLUTION INTRAVENOUS CONTINUOUS
Status: DISPENSED | OUTPATIENT
Start: 2025-05-11 | End: 2025-05-13

## 2025-05-11 RX ORDER — MAGNESIUM SULFATE HEPTAHYDRATE 40 MG/ML
2 INJECTION, SOLUTION INTRAVENOUS ONCE
Status: COMPLETED | OUTPATIENT
Start: 2025-05-11 | End: 2025-05-11

## 2025-05-11 RX ADMIN — MAGNESIUM SULFATE HEPTAHYDRATE 2 G: 40 INJECTION, SOLUTION INTRAVENOUS at 19:17

## 2025-05-11 RX ADMIN — HEPARIN SODIUM 1600 UNITS/HR: 10000 INJECTION, SOLUTION INTRAVENOUS at 20:07

## 2025-05-11 RX ADMIN — Medication 2 L/MIN: at 17:44

## 2025-05-11 RX ADMIN — SODIUM CHLORIDE 500 ML: 0.9 INJECTION, SOLUTION INTRAVENOUS at 17:44

## 2025-05-11 RX ADMIN — SODIUM CHLORIDE 1000 ML: 0.9 INJECTION, SOLUTION INTRAVENOUS at 19:17

## 2025-05-11 RX ADMIN — IOHEXOL 75 ML: 350 INJECTION, SOLUTION INTRAVENOUS at 19:04

## 2025-05-11 RX ADMIN — Medication 2.5 L/MIN: at 21:51

## 2025-05-11 SDOH — ECONOMIC STABILITY: HOUSING INSECURITY: IN THE LAST 12 MONTHS, WAS THERE A TIME WHEN YOU WERE NOT ABLE TO PAY THE MORTGAGE OR RENT ON TIME?: NO

## 2025-05-11 SDOH — SOCIAL STABILITY: SOCIAL INSECURITY: ARE THERE ANY APPARENT SIGNS OF INJURIES/BEHAVIORS THAT COULD BE RELATED TO ABUSE/NEGLECT?: NO

## 2025-05-11 SDOH — SOCIAL STABILITY: SOCIAL INSECURITY
WITHIN THE LAST YEAR, HAVE YOU BEEN KICKED, HIT, SLAPPED, OR OTHERWISE PHYSICALLY HURT BY YOUR PARTNER OR EX-PARTNER?: PATIENT UNABLE TO ANSWER

## 2025-05-11 SDOH — SOCIAL STABILITY: SOCIAL INSECURITY: WITHIN THE LAST YEAR, HAVE YOU BEEN AFRAID OF YOUR PARTNER OR EX-PARTNER?: PATIENT UNABLE TO ANSWER

## 2025-05-11 SDOH — SOCIAL STABILITY: SOCIAL INSECURITY: WERE YOU ABLE TO COMPLETE ALL THE BEHAVIORAL HEALTH SCREENINGS?: YES

## 2025-05-11 SDOH — ECONOMIC STABILITY: FOOD INSECURITY: HOW HARD IS IT FOR YOU TO PAY FOR THE VERY BASICS LIKE FOOD, HOUSING, MEDICAL CARE, AND HEATING?: NOT VERY HARD

## 2025-05-11 SDOH — ECONOMIC STABILITY: FOOD INSECURITY: WITHIN THE PAST 12 MONTHS, YOU WORRIED THAT YOUR FOOD WOULD RUN OUT BEFORE YOU GOT THE MONEY TO BUY MORE.: NEVER TRUE

## 2025-05-11 SDOH — SOCIAL STABILITY: SOCIAL INSECURITY
WITHIN THE LAST YEAR, HAVE YOU BEEN HUMILIATED OR EMOTIONALLY ABUSED IN OTHER WAYS BY YOUR PARTNER OR EX-PARTNER?: PATIENT UNABLE TO ANSWER

## 2025-05-11 SDOH — SOCIAL STABILITY: SOCIAL INSECURITY: ARE YOU OR HAVE YOU BEEN THREATENED OR ABUSED PHYSICALLY, EMOTIONALLY, OR SEXUALLY BY ANYONE?: NO

## 2025-05-11 SDOH — ECONOMIC STABILITY: FOOD INSECURITY: WITHIN THE PAST 12 MONTHS, THE FOOD YOU BOUGHT JUST DIDN'T LAST AND YOU DIDN'T HAVE MONEY TO GET MORE.: NEVER TRUE

## 2025-05-11 SDOH — ECONOMIC STABILITY: HOUSING INSECURITY: IN THE PAST 12 MONTHS, HOW MANY TIMES HAVE YOU MOVED WHERE YOU WERE LIVING?: 0

## 2025-05-11 SDOH — SOCIAL STABILITY: SOCIAL INSECURITY
WITHIN THE LAST YEAR, HAVE YOU BEEN RAPED OR FORCED TO HAVE ANY KIND OF SEXUAL ACTIVITY BY YOUR PARTNER OR EX-PARTNER?: PATIENT UNABLE TO ANSWER

## 2025-05-11 SDOH — ECONOMIC STABILITY: HOUSING INSECURITY: AT ANY TIME IN THE PAST 12 MONTHS, WERE YOU HOMELESS OR LIVING IN A SHELTER (INCLUDING NOW)?: NO

## 2025-05-11 SDOH — ECONOMIC STABILITY: INCOME INSECURITY: IN THE PAST 12 MONTHS HAS THE ELECTRIC, GAS, OIL, OR WATER COMPANY THREATENED TO SHUT OFF SERVICES IN YOUR HOME?: NO

## 2025-05-11 SDOH — ECONOMIC STABILITY: TRANSPORTATION INSECURITY: IN THE PAST 12 MONTHS, HAS LACK OF TRANSPORTATION KEPT YOU FROM MEDICAL APPOINTMENTS OR FROM GETTING MEDICATIONS?: NO

## 2025-05-11 SDOH — SOCIAL STABILITY: SOCIAL INSECURITY: DOES ANYONE TRY TO KEEP YOU FROM HAVING/CONTACTING OTHER FRIENDS OR DOING THINGS OUTSIDE YOUR HOME?: NO

## 2025-05-11 SDOH — SOCIAL STABILITY: SOCIAL INSECURITY: HAS ANYONE EVER THREATENED TO HURT YOUR FAMILY OR YOUR PETS?: NO

## 2025-05-11 SDOH — SOCIAL STABILITY: SOCIAL INSECURITY: HAVE YOU HAD THOUGHTS OF HARMING ANYONE ELSE?: NO

## 2025-05-11 SDOH — SOCIAL STABILITY: SOCIAL INSECURITY: DO YOU FEEL ANYONE HAS EXPLOITED OR TAKEN ADVANTAGE OF YOU FINANCIALLY OR OF YOUR PERSONAL PROPERTY?: NO

## 2025-05-11 SDOH — SOCIAL STABILITY: SOCIAL INSECURITY: ABUSE: ADULT

## 2025-05-11 SDOH — SOCIAL STABILITY: SOCIAL INSECURITY: DO YOU FEEL UNSAFE GOING BACK TO THE PLACE WHERE YOU ARE LIVING?: NO

## 2025-05-11 ASSESSMENT — LIFESTYLE VARIABLES
TOTAL SCORE: 0
EVER HAD A DRINK FIRST THING IN THE MORNING TO STEADY YOUR NERVES TO GET RID OF A HANGOVER: NO
AUDIT-C TOTAL SCORE: 0
HOW OFTEN DO YOU HAVE 6 OR MORE DRINKS ON ONE OCCASION: NEVER
HAVE YOU EVER FELT YOU SHOULD CUT DOWN ON YOUR DRINKING: NO
HAVE PEOPLE ANNOYED YOU BY CRITICIZING YOUR DRINKING: NO
SKIP TO QUESTIONS 9-10: 1
EVER FELT BAD OR GUILTY ABOUT YOUR DRINKING: NO
HOW MANY STANDARD DRINKS CONTAINING ALCOHOL DO YOU HAVE ON A TYPICAL DAY: PATIENT DOES NOT DRINK
HOW OFTEN DO YOU HAVE A DRINK CONTAINING ALCOHOL: NEVER
AUDIT-C TOTAL SCORE: 0

## 2025-05-11 ASSESSMENT — ACTIVITIES OF DAILY LIVING (ADL)
LACK_OF_TRANSPORTATION: NO
GROOMING: NEEDS ASSISTANCE
JUDGMENT_ADEQUATE_SAFELY_COMPLETE_DAILY_ACTIVITIES: YES
HEARING - LEFT EAR: HEARING AID
ADEQUATE_TO_COMPLETE_ADL: YES
PATIENT'S MEMORY ADEQUATE TO SAFELY COMPLETE DAILY ACTIVITIES?: YES
DRESSING YOURSELF: NEEDS ASSISTANCE
HEARING - RIGHT EAR: HEARING AID
ASSISTIVE_DEVICE: WALKER;DENTURES LOWER;DENTURES UPPER
WALKS IN HOME: NEEDS ASSISTANCE
TOILETING: NEEDS ASSISTANCE
BATHING: NEEDS ASSISTANCE
LACK_OF_TRANSPORTATION: NO
FEEDING YOURSELF: INDEPENDENT

## 2025-05-11 ASSESSMENT — PAIN DESCRIPTION - PROGRESSION: CLINICAL_PROGRESSION: NOT CHANGED

## 2025-05-11 ASSESSMENT — COGNITIVE AND FUNCTIONAL STATUS - GENERAL
MOVING FROM LYING ON BACK TO SITTING ON SIDE OF FLAT BED WITH BEDRAILS: A LOT
MOBILITY SCORE: 12
HELP NEEDED FOR BATHING: A LOT
DRESSING REGULAR LOWER BODY CLOTHING: A LOT
PERSONAL GROOMING: A LITTLE
MOVING TO AND FROM BED TO CHAIR: A LOT
STANDING UP FROM CHAIR USING ARMS: A LOT
WALKING IN HOSPITAL ROOM: A LOT
CLIMB 3 TO 5 STEPS WITH RAILING: A LOT
EATING MEALS: A LITTLE
TURNING FROM BACK TO SIDE WHILE IN FLAT BAD: A LOT
DAILY ACTIVITIY SCORE: 14
TOILETING: A LOT
PATIENT BASELINE BEDBOUND: NO
DRESSING REGULAR UPPER BODY CLOTHING: A LOT

## 2025-05-11 ASSESSMENT — PAIN SCALES - GENERAL
PAINLEVEL_OUTOF10: 0 - NO PAIN
PAINLEVEL_OUTOF10: 0 - NO PAIN
PAINLEVEL_OUTOF10: 3
PAINLEVEL_OUTOF10: 0 - NO PAIN

## 2025-05-11 ASSESSMENT — PATIENT HEALTH QUESTIONNAIRE - PHQ9
2. FEELING DOWN, DEPRESSED OR HOPELESS: SEVERAL DAYS
1. LITTLE INTEREST OR PLEASURE IN DOING THINGS: NEARLY EVERY DAY
SUM OF ALL RESPONSES TO PHQ9 QUESTIONS 1 & 2: 4

## 2025-05-11 ASSESSMENT — PAIN - FUNCTIONAL ASSESSMENT
PAIN_FUNCTIONAL_ASSESSMENT: 0-10
PAIN_FUNCTIONAL_ASSESSMENT: 0-10

## 2025-05-11 NOTE — ED PROVIDER NOTES
HPI   Chief Complaint   Patient presents with    Fatigue    Weakness, Gen     Generalized weakness, fatigue, decreased appetite, AMS         History provided by:  Patient and relative    Chief Complaint   Patient presents with    Fatigue    Weakness, Gen     Generalized weakness, fatigue, decreased appetite, AMS       History of Present Illness:  Brianne Mallory is a 80 y.o. female presents with increased generalized weakness and decreased appetite with some intermittent confusion over the past 3 days.  No fever or chills.  No cough or congestion.  No chest pain or shortness of breath.  No back or flank pain.  No abdominal pain.  No nausea, vomiting or diarrhea.  No loss of motor or sensory function.  No loss of bladder or bowel function.      PMFSH:   As per HPI, otherwise nurses notes reviewed in EMR.    Past Medical History: Medical History[1]   Past Surgical History: Surgical History[2]   Family History: Family History[3]   Social History:  Social History[4]  Allergies: Allergies[5]  Current Outpatient Medications   Medication Instructions    acetaminophen (TYLENOL) 650 mg, oral, Every 4 hours    calcium citrate-vitamin D2 250 mg-2.5 mcg (100 unit) tablet 1 tablet, Daily    cyanocobalamin (VITAMIN B-12) 250 mcg, Daily    levothyroxine (Synthroid, Levoxyl) 88 mcg tablet 1 tablet, Daily    lidocaine 4 % patch 2 patches, transdermal, Daily, Remove & discard patch within 12 hours or as directed by MD.    pantoprazole (PROTONIX) 40 mg, oral, Daily before breakfast, Do not crush, chew, or split.    sertraline (ZOLOFT) 25 mg, Daily    simvastatin (Zocor) 40 mg tablet 1 tablet, Nightly    traMADol (ULTRAM) 50 mg, oral, Every 6 hours PRN              Patient History   Medical History[6]  Surgical History[7]  Family History[8]  Social History[9]    Physical Exam   ED Triage Vitals   Temperature Heart Rate Resp BP   05/11/25 1651 05/11/25 1651 -- 05/11/25 1653   36.7 °C (98.1 °F) 94  88/67      Pulse Ox Temp src Heart Rate  "Source Patient Position   05/11/25 1653 -- 05/11/25 1651 05/11/25 1651   94 %  Monitor Sitting      BP Location FiO2 (%)     05/11/25 1651 --     Right arm        Physical Exam  Physical Exam:    ED Triage Vitals   Temperature Heart Rate Resp BP   05/11/25 1651 05/11/25 1651 -- 05/11/25 1653   36.7 °C (98.1 °F) 94  88/67      Pulse Ox Temp src Heart Rate Source Patient Position   05/11/25 1653 -- 05/11/25 1651 05/11/25 1651   94 %  Monitor Sitting      BP Location FiO2 (%)     05/11/25 1651 --     Right arm          Patient Vitals for the past 24 hrs:   BP Temp Pulse Resp SpO2 Height Weight   05/11/25 2000 -- -- 94 -- 95 % -- --   05/11/25 1858 109/64 36.6 °C (97.9 °F) 88 18 94 % -- --   05/11/25 1800 -- -- 86 20 (!) 92 % -- --   05/11/25 1739 123/58 -- 84 16 94 % -- --   05/11/25 1658 -- -- -- -- (!) 90 % -- --   05/11/25 1657 -- -- -- -- -- 1.727 m (5' 8\") 88.5 kg (195 lb)   05/11/25 1653 88/67 -- -- -- 94 % -- --   05/11/25 1651 -- 36.7 °C (98.1 °F) 94 -- -- -- --       Constitutional: Vital signs per nursing notes.  Well developed, well nourished.  Mild acute distress.  Hard of hearing.  Psychiatric: alert and oriented to person, place, and time; no abnormalities of mood or affect; memory intact  Eyes: PERRL; conjunctivae and lids normal; EOMI  ENT: otoscopic exam of external canal and TM´s normal; nasal mucosa, turbinates, and septum normal; mouth, tongue, and pharynx normal; pharynx without edema, exudate, or injection; except dry mucous membranes  Respiratory: normal respiratory effort and excursion; no rales, rhonchi, or wheezes; equal but diminished air entry  Cardiovascular: regular rate and rhythm; no murmurs, rubs or gallops; symmetric pulses; 2+ chronic brawny bilateral lower extremity edema; normal capillary refill; distal pulses present  Neurological: normal speech; CN II-XII grossly intact; normal motor and sensory function; no nystagmus; no pronator drift; normal finger to nose  GI: no masses, " tenderness, rebound or guarding; no palpable, pulsatile mass; no organomegaly; no hernia; normal bowel sounds; (-) Carbone´s sign; (-) McBurney´s sign; (-) CVA tenderness  Lymphatic: no adenopathy of neck  Musculoskeletal: normal digits and nails; no gross tendon or ligament injury; normal to palpation; normal strength/tone; neurovascular status intact; (-) Lamin´s sign; (-) straight leg raise; no palpable cords; calf circumference equal bilaterally  Skin: normal to inspection; normal to palpation; no rash  GCS: 15      ED Course & MDM   Diagnoses as of 05/11/25 2004   Pulmonary embolism on left (Multi)   Hypoxia   Hyponatremia   Hypomagnesemia   Leukocytosis, unspecified type   Anemia, unspecified type   Recurrent malignant neoplasm of lung of unknown cell type, unspecified laterality (Multi)                 No data recorded     Brohman Coma Scale Score: 15 (05/11/25 1743 : Tatyana Duran RN)                           Medical Decision Making  Medical Decision Making:    EKG: My interpretation of EKG is sinus rhythm at 94 bpm with right bundle branch block, left anterior fascicular block, nonspecific ST-T changes and occasional PAC             My interpretation of second EKG is sinus rhythm 89 bpm with right bundle branch block with nonspecific ST-T changes and occasional PVC    Labs:   Labs Reviewed   CBC WITH AUTO DIFFERENTIAL - Abnormal       Result Value    WBC 23.6 (*)     nRBC 0.0      RBC 4.04      Hemoglobin 11.2 (*)     Hematocrit 34.6 (*)     MCV 86      MCH 27.7      MCHC 32.4      RDW 15.4 (*)     Platelets 431      Neutrophils % 87.8      Immature Granulocytes %, Automated 1.7 (*)     Lymphocytes % 3.7      Monocytes % 5.8      Eosinophils % 0.7      Basophils % 0.3      Neutrophils Absolute 20.71 (*)     Immature Granulocytes Absolute, Automated 0.40      Lymphocytes Absolute 0.88      Monocytes Absolute 1.37 (*)     Eosinophils Absolute 0.17      Basophils Absolute 0.06     COMPREHENSIVE METABOLIC  PANEL - Abnormal    Glucose 110 (*)     Sodium 127 (*)     Potassium 4.0      Chloride 94 (*)     Bicarbonate 23      Anion Gap 14      Urea Nitrogen 18      Creatinine 0.71      eGFR 85      Calcium 10.9 (*)     Albumin 3.4      Alkaline Phosphatase 90      Total Protein 7.0      AST 19      Bilirubin, Total 0.4      ALT 11     MAGNESIUM - Abnormal    Magnesium 1.36 (*)    PROTIME-INR - Abnormal    Protime 14.4 (*)     INR 1.3 (*)    BLOOD GAS ARTERIAL - Abnormal    POCT pH, Arterial 7.45 (*)     POCT pCO2, Arterial 33 (*)     POCT pO2, Arterial 63 (*)     POCT SO2, Arterial 93 (*)     POCT Oxy Hemoglobin, Arterial 90.6 (*)     POCT Base Excess, Arterial -0.4      POCT HCO3 Calculated, Arterial 22.9      Patient Temperature        FiO2 28      Site of Arterial Puncture Radial Right      Ramón's Test Positive      Site of Arterial Puncture RRAD      Ramón's Test YES     LIPASE - Normal    Lipase 22      Narrative:     Venipuncture immediately after or during the administration of Metamizole may lead to falsely low results. Testing should be performed immediately prior to Metamizole dosing.   B-TYPE NATRIURETIC PEPTIDE - Normal    BNP 83      Narrative:        <100 pg/mL - Heart failure unlikely  100-299 pg/mL - Intermediate probability of acute heart                  failure exacerbation. Correlate with clinical                  context and patient history.    >=300 pg/mL - Heart Failure likely. Correlate with clinical                  context and patient history.    BNP testing is performed using different testing methodology at [facility] than at other [facility]. Direct result comparisons should only be made within the same method.      LACTATE - Normal    Lactate 1.5      Narrative:     Venipuncture immediately after or during the administration of Metamizole may lead to falsely low results. Testing should be performed immediately prior to Metamizole dosing.   SARS-COV-2 PCR - Normal     Coronavirus 2019, PCR Not Detected      Narrative:     This assay is an FDA-cleared, in vitro diagnostic nucleic acid amplification test for the qualitative detection and differentiation of SARS CoV-2 from nasopharyngeal specimens collected from individuals with signs and symptoms of respiratory tract infections, and has been validated for use at [facility]. Negative results do not preclude COVID-19 infections and should not be used as the sole basis for diagnosis, treatment, or other management decisions. Testing for SARS CoV-2 is recommended only for patients who meet current clinical and/or epidemiological criteria defined by federal, state, or local public health directives.   INFLUENZA A AND B PCR - Normal    Flu A Result Not Detected      Flu B Result Not Detected      Narrative:     This assay is an in vitro diagnostic multiplex nucleic acid amplification test for the detection and discrimination of Influenza A & B from nasopharyngeal specimens, and has been validated for use at [facility]. Negative results do not preclude Influenza A/B infections, and should not be used as the sole basis for diagnosis, treatment, or other management decisions. If Influenza A/B and RSV PCR results are negative, testing for Parainfluenza virus, Adenovirus and Metapneumovirus is routinely performed for [facility] pediatric oncology and intensive care inpatients, and is available on other patients by placing an add-on request.   RSV PCR - Normal    RSV PCR Not Detected      Narrative:     This assay is an FDA-cleared, in vitro diagnostic nucleic acid amplification test for the detection of RSV from nasopharyngeal specimens, and has been validated for use at [facility]. Negative results do not preclude RSV infections, and should not be used as the sole basis for diagnosis, treatment, or other management decisions. If Influenza A/B and RSV PCR results are negative,  testing for Parainfluenza virus, Adenovirus and Metapneumovirus is routinely performed for pediatric oncology and intensive care inpatients at [facility], and is available on other patients by placing an add-on request.       SERIAL TROPONIN-INITIAL - Normal    Troponin I, High Sensitivity 7      Narrative:     Less than 99th percentile of normal range cutoff-  Female and children under 18 years old <14 ng/L; Male <21 ng/L: Negative  Repeat testing should be performed if clinically indicated.     Female and children under 18 years old 14-50 ng/L; Male 21-50 ng/L:  Consistent with possible cardiac damage and possible increased clinical   risk. Serial measurements may help to assess extent of myocardial damage.     >50 ng/L: Consistent with cardiac damage, increased clinical risk and  myocardial infarction. Serial measurements may help assess extent of   myocardial damage.      NOTE: Children less than 1 year old may have higher baseline troponin   levels and results should be interpreted in conjunction with the overall   clinical context.     NOTE: Troponin I testing is performed using a different   testing methodology at [facility] than at other   [facility]. Direct result comparisons should only   be made within the same method.   URINALYSIS WITH REFLEX CULTURE AND MICROSCOPIC - Normal    Color, Urine Yellow      Appearance, Urine Clear      Specific Gravity, Urine 1.032      pH, Urine 5.5      Protein, Urine NEGATIVE      Glucose, Urine Normal      Blood, Urine NEGATIVE      Ketones, Urine NEGATIVE      Bilirubin, Urine NEGATIVE      Urobilinogen, Urine Normal      Nitrite, Urine NEGATIVE      Leukocyte Esterase, Urine NEGATIVE     SERIAL TROPONIN, 1 HOUR - Normal    Troponin I, High Sensitivity 6      Narrative:     Less than 99th percentile of normal range cutoff-  Female and children under 18 years old <14 ng/L; Male <21 ng/L: Negative  Repeat testing should be performed if clinically indicated.      Female and children under 18 years old 14-50 ng/L; Male 21-50 ng/L:  Consistent with possible cardiac damage and possible increased clinical   risk. Serial measurements may help to assess extent of myocardial damage.     >50 ng/L: Consistent with cardiac damage, increased clinical risk and  myocardial infarction. Serial measurements may help assess extent of   myocardial damage.      NOTE: Children less than 1 year old may have higher baseline troponin   levels and results should be interpreted in conjunction with the overall   clinical context.     NOTE: Troponin I testing is performed using a different   testing methodology at [facility] than at [facility]. Direct result comparisons should only   be made within the same method.   BLOOD CULTURE   BLOOD CULTURE   TROPONIN SERIES- (INITIAL, 1 HR)    Narrative:     The following orders were created for panel order Troponin I Series, High Sensitivity (0, 1 HR).  Procedure                               Abnormality         Status                     ---------                               -----------         ------                     Troponin I, High Sensiti...[[id number]]  Normal              Final result               Troponin, High Sensitivi...[[id number]]  Normal              Final result                 Please view results for these tests on the individual orders.   URINALYSIS WITH REFLEX CULTURE AND MICROSCOPIC    Narrative:     The following orders were created for panel order Urinalysis with Reflex Culture and Microscopic.  Procedure                               Abnormality         Status                     ---------                               -----------         ------                     Urinalysis with Reflex C...[[id number]]  Normal              Final result               Extra Urine Gray Tube[[id number]]                            In process                   Please view results for these tests on the individual orders.    EXTRA URINE GRAY TUBE       Diagnostic Imaging:   CT angio chest for pulmonary embolism   Final Result   Acute segmental and subsegmental PE of the left lung with   straightening of interventricular septum suggestive of possible right   heart strain. Please correlate with echocardiography.        Increased size right hilar and new upper mediastinal masses or nodes   concerning for recurrent neoplasm. New nonspecific prominent left   axillary lymph node. Attention on follow-up examination.        No acute abdomen and pelvic process.        TIANNA Fonseca discussed the significance and urgency of this   critical finding by Epic secure chat with  JAQUAN ROMAN on   5/11/2025 at 7:44 pm.  (**-RCF-**) Findings:  See findings.        Signed by: Aquiles Fonseca 5/11/2025 7:46 PM   Dictation workstation:   Choose Digital      CT abdomen pelvis w IV contrast   Final Result   Acute segmental and subsegmental PE of the left lung with   straightening of interventricular septum suggestive of possible right   heart strain. Please correlate with echocardiography.        Increased size right hilar and new upper mediastinal masses or nodes   concerning for recurrent neoplasm. New nonspecific prominent left   axillary lymph node. Attention on follow-up examination.        No acute abdomen and pelvic process.        TIANNA Fonseca discussed the significance and urgency of this   critical finding by Epic secure chat with  JAQUAN ROMAN on   5/11/2025 at 7:44 pm.  (**-RCF-**) Findings:  See findings.        Signed by: Aquiles Fonseca 5/11/2025 7:46 PM   Dictation workstation:   [id number]      CT head wo IV contrast   Final Result   No CT evidence of acute intracranial abnormality.        Chronic microvascular ischemic change and parenchymal atrophy.        MACRO   None        Signed by: Aquiles Fonseca 5/11/2025 7:18 PM   Dictation workstation:   [id number]      XR chest 1 view   Final Result   Small left effusion  suspected.        MACRO:   None        Signed by: Sudeep Francis 5/11/2025 6:19 PM   Dictation workstation:   [id number]          ED Medication Administration:   Medications   oxygen (O2) therapy (2 L/min inhalation Start 5/11/25 1744)   sodium chloride 0.9 % bolus 1,000 mL (1,000 mL intravenous New Bag 5/11/25 1917)   heparin bolus from bag 7,100 Units (has no administration in time range)   heparin 25,000 Units in dextrose 5% 250 mL (100 Units/mL) infusion (premix) (has no administration in time range)   heparin bolus from bag 3,000-6,000 Units (has no administration in time range)   sodium chloride 0.9 % bolus 500 mL (0 mL intravenous Stopped 5/11/25 1915)   magnesium sulfate 2 g in sterile water for injection 50 mL (0 g intravenous Stopped 5/11/25 1955)   iohexol (OMNIPaque) 350 mg iodine/mL solution 75 mL (75 mL intravenous Given 5/11/25 1904)       ED Course:     Brianne Mallory is a 80 y.o. female presents with generalized weakness.    Differential Diagnoses Considered:    Patient presents with  generalized weakness .     Chest x-ray to evaluate for evidence of pneumonia.     EKG/troponin to evaluate for evidence of arrhythmia/ACS/AMI.    Lab work to evaluate for evidence of severe anemia, thrombocytopenia, leukocytosis/leukopenia, or electrolyte abnormality (including hypokalemia, hyperkalemia, hypernatremia, hyponatremia, hyperglycemia, or hypoglycemia).    PT/INR to evaluate for evidence of coagulopathy.    LFTs to evaluate for evidence of acute hepatitis.    UA to evaluate for evidence of UTI.    Lactic acid and Blood cultures to evaluate for sepsis and bacteremia.     COVID-19/RSV/Influenza swab to evaluate for evidence of respective infections.             Diagnoses as of 05/11/25 2004   Pulmonary embolism on left (Multi)   Hypoxia   Hyponatremia   Hypomagnesemia   Leukocytosis, unspecified type   Anemia, unspecified type   Recurrent malignant neoplasm of lung of unknown cell type, unspecified laterality  (Multi)       Abnormal Labs Reviewed   CBC WITH AUTO DIFFERENTIAL - Abnormal; Notable for the following components:       Result Value    WBC 23.6 (*)     Hemoglobin 11.2 (*)     Hematocrit 34.6 (*)     RDW 15.4 (*)     Immature Granulocytes %, Automated 1.7 (*)     Neutrophils Absolute 20.71 (*)     Monocytes Absolute 1.37 (*)     All other components within normal limits   COMPREHENSIVE METABOLIC PANEL - Abnormal; Notable for the following components:    Glucose 110 (*)     Sodium 127 (*)     Chloride 94 (*)     Calcium 10.9 (*)     All other components within normal limits   MAGNESIUM - Abnormal; Notable for the following components:    Magnesium 1.36 (*)     All other components within normal limits   PROTIME-INR - Abnormal; Notable for the following components:    Protime 14.4 (*)     INR 1.3 (*)     All other components within normal limits   BLOOD GAS ARTERIAL - Abnormal; Notable for the following components:    POCT pH, Arterial 7.45 (*)     POCT pCO2, Arterial 33 (*)     POCT pO2, Arterial 63 (*)     POCT SO2, Arterial 93 (*)     POCT Oxy Hemoglobin, Arterial 90.6 (*)     All other components within normal limits       BP      Temp      Pulse 94 (05/11/25 2000)   Resp      SpO2 95 % (05/11/25 2000)          Diagnostic evaluation was completed.  COVID, influenza and RSV are negative.  BNP is in the normal range so do not suspect CHF.  ABG on 2 L of nasal cannula oxygen shows a pH of 7.45 with PCO2 of 33 and pO2 of 63.  Lipase is in the normal range so do not suspect pancreatitis.  Metabolic panel shows an elevated glucose at 110.  Sodium is low at 127.  Potassium is in the normal range.  Renal and liver function are in the normal range.  Magnesium is low at 1.36.  Initial troponin is in the normal range.  Second troponin was also normal range so do not suspect acute coronary syndrome.  Lactate is in the normal range at 1.5.  INR is 1.3.  CBC shows an elevated white blood cell count of 23.6 with mild anemia  with a hemoglobin of 11.2.  Platelets are in the normal range.  Chest x-ray shows small left effusion suspected.  CT of the chest shows acute segmental and subsegmental PE of the left lung with straightening of interventricular septum suggestive of possible right heart strain.  Increased size right hilar and new upper mediastinal masses or nodes concerning for recurrent neoplasm.  New nonspecific prominent left axillary lymph node.  No acute abdomen and pelvic process on the CT the abdomen pelvis.  CT of the head shows no evidence of acute intracranial abnormality.  Chronic microvascular ischemic change and parenchymal atrophy.  Urinalysis is negative.    Re-evaluation: Repeat evaluation at 1955 shows the patient feeling better.  Her vital signs have improved.    Patient was treated with oxygen, IV normal saline and heparin drip.  She also received IV magnesium.    Medications administered improved the patient's condition.    The patient will require hospitalization for further workup and evaluation.  While the CT of the chest does show possible right heart strain, there is no evidence of this on EKG or troponins.  The patient also clinically is not showing any signs or symptoms concerning for right heart strain at this time so the patient will be hospitalized and receive an echocardiogram during admission.  Heparin drip was started.    The patient's condition requires ongoing treatment and evaluation necessitating hospital admission.  I have reviewed the patient's history, physical exam, and test information with the admitting physician,    ELHAM Blakely/Dr. Camacho               , who agrees to hospitalize the patient.     I discussed the results and plan for hospitalization with the patient and/or family/friend if present.  Questions were addressed.  Patient and/or family/friend expressed understanding.    Shared decision making made with patient, and/or family, who agrees with plan.            Diagnosis:   1.  Pulmonary embolism on left (Multi)    2. Hypoxia    3. Hyponatremia    4. Hypomagnesemia    5. Leukocytosis, unspecified type    6. Anemia, unspecified type    7. 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 with completion lobectomy on 1/21/21    Hyperlipidemia     Hypertension     Hypothyroidism     Mass of upper lobe of right lung     RUL mass measuring currently 4.8 x 3.3 cm    Sprain of ankle, left 09/11/2024   [2]   Past Surgical History:  Procedure Laterality Date    BRONCHOSCOPY      CATARACT EXTRACTION      JOINT REPLACEMENT      KNEE ARTHROPLASTY      right an dleft    OTHER SURGICAL HISTORY  12/28/2020    Biopsy    OTHER SURGICAL HISTORY Left 01/21/2021    GERTRUDE Lung lobectomy   [3]   Family History  Problem Relation Name Age of Onset    Hypertension Mother      Heart attack Father      Lung cancer Maternal Grandmother      Diabetes Paternal Grandmother     [4]   Social History  Tobacco Use    Smoking status: Former     Current packs/day: 1.00     Average packs/day: 1 pack/day for 6.0 years (6.0 ttl pk-yrs)     Types: Cigarettes     Start date: 5/1/2019     Passive exposure: Past    Smokeless tobacco: Never   Vaping Use    Vaping status: Never Used   Substance Use Topics    Alcohol use: Not Currently     Comment: very rare    Drug use: Never   [5]   Allergies  Allergen Reactions    Aspirin Hives    Ibuprofen Hives    Nickel Unknown    Sulfa (Sulfonamide Antibiotics) Unknown     Penicillins Rash     mild; occurred approx 20 years ago   [6]   Past Medical History:  Diagnosis Date    Anxiety     Cataract     Clotting disorder (Multi)     COPD (chronic obstructive pulmonary disease) (Multi)     Coronary artery calcification seen on CT scan     GERD (gastroesophageal reflux disease)     History of blood transfusion     History of cancer of upper lobe bronchus or lung     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 with completion lobectomy on 1/21/21    Hyperlipidemia     Hypertension     Hypothyroidism     Mass of upper lobe of right lung     RUL mass measuring currently 4.8 x 3.3 cm    Sprain of ankle, left 09/11/2024   [7]   Past Surgical History:  Procedure Laterality Date    BRONCHOSCOPY      CATARACT EXTRACTION      JOINT REPLACEMENT      KNEE ARTHROPLASTY      right an dleft    OTHER SURGICAL HISTORY  12/28/2020    Biopsy    OTHER SURGICAL HISTORY Left 01/21/2021    GERTRUDE Lung lobectomy   [8]   Family History  Problem Relation Name Age of Onset    Hypertension Mother      Heart attack Father      Lung cancer Maternal Grandmother      Diabetes Paternal Grandmother     [9]   Social History  Tobacco Use    Smoking status: Former     Current packs/day: 1.00     Average packs/day: 1 pack/day for 6.0 years (6.0 ttl pk-yrs)     Types: Cigarettes     Start date: 5/1/2019     Passive exposure: Past    Smokeless tobacco: Never   Vaping Use    Vaping status: Never Used   Substance Use Topics    Alcohol use: Not Currently     Comment: very rare    Drug use: Never        Wesley CAMARA MD  05/11/25 2004

## 2025-05-12 ENCOUNTER — APPOINTMENT (OUTPATIENT)
Facility: HOSPITAL | Age: 80
End: 2025-05-12
Payer: MEDICARE

## 2025-05-12 ENCOUNTER — APPOINTMENT (OUTPATIENT)
Dept: CARDIOLOGY | Facility: HOSPITAL | Age: 80
DRG: 175 | End: 2025-05-12
Payer: MEDICARE

## 2025-05-12 LAB
ALBUMIN SERPL BCP-MCNC: 2.7 G/DL (ref 3.4–5)
ALP SERPL-CCNC: 75 U/L (ref 33–136)
ALT SERPL W P-5'-P-CCNC: 8 U/L (ref 7–45)
ANION GAP SERPL CALC-SCNC: 8 MMOL/L (ref 10–20)
AORTIC VALVE MEAN GRADIENT: 6 MMHG
AORTIC VALVE PEAK VELOCITY: 1.58 M/S
AST SERPL W P-5'-P-CCNC: 15 U/L (ref 9–39)
ATRIAL RATE: 89 BPM
ATRIAL RATE: 94 BPM
AV PEAK GRADIENT: 10 MMHG
AVA (PEAK VEL): 2.25 CM2
AVA (VTI): 2.9 CM2
BASOPHILS # BLD AUTO: 0.04 X10*3/UL (ref 0–0.1)
BASOPHILS NFR BLD AUTO: 0.2 %
BILIRUB SERPL-MCNC: 0.3 MG/DL (ref 0–1.2)
BUN SERPL-MCNC: 15 MG/DL (ref 6–23)
CALCIUM SERPL-MCNC: 9.5 MG/DL (ref 8.6–10.3)
CHLORIDE SERPL-SCNC: 99 MMOL/L (ref 98–107)
CO2 SERPL-SCNC: 24 MMOL/L (ref 21–32)
CREAT SERPL-MCNC: 0.55 MG/DL (ref 0.5–1.05)
CREAT UR-MCNC: 98.9 MG/DL (ref 20–320)
EGFRCR SERPLBLD CKD-EPI 2021: >90 ML/MIN/1.73M*2
EJECTION FRACTION APICAL 4 CHAMBER: 66
EJECTION FRACTION: 63 %
EOSINOPHIL # BLD AUTO: 0.13 X10*3/UL (ref 0–0.4)
EOSINOPHIL NFR BLD AUTO: 0.6 %
ERYTHROCYTE [DISTWIDTH] IN BLOOD BY AUTOMATED COUNT: 15.7 % (ref 11.5–14.5)
GLUCOSE SERPL-MCNC: 122 MG/DL (ref 74–99)
HCT VFR BLD AUTO: 28.9 % (ref 36–46)
HGB BLD-MCNC: 9.4 G/DL (ref 12–16)
HOLD SPECIMEN: NORMAL
IMM GRANULOCYTES # BLD AUTO: 0.24 X10*3/UL (ref 0–0.5)
IMM GRANULOCYTES NFR BLD AUTO: 1.2 % (ref 0–0.9)
LEFT ATRIUM VOLUME AREA LENGTH INDEX BSA: 17.5 ML/M2
LEFT VENTRICLE INTERNAL DIMENSION DIASTOLE: 3.56 CM (ref 3.5–6)
LEFT VENTRICULAR OUTFLOW TRACT DIAMETER: 2 CM
LV EJECTION FRACTION BIPLANE: 66 %
LYMPHOCYTES # BLD AUTO: 0.86 X10*3/UL (ref 0.8–3)
LYMPHOCYTES NFR BLD AUTO: 4.2 %
MAGNESIUM SERPL-MCNC: 1.57 MG/DL (ref 1.6–2.4)
MCH RBC QN AUTO: 27.5 PG (ref 26–34)
MCHC RBC AUTO-ENTMCNC: 32.5 G/DL (ref 32–36)
MCV RBC AUTO: 85 FL (ref 80–100)
MITRAL VALVE E/A RATIO: 1.03
MONOCYTES # BLD AUTO: 1.57 X10*3/UL (ref 0.05–0.8)
MONOCYTES NFR BLD AUTO: 7.7 %
NEUTROPHILS # BLD AUTO: 17.5 X10*3/UL (ref 1.6–5.5)
NEUTROPHILS NFR BLD AUTO: 86.1 %
NRBC BLD-RTO: 0 /100 WBCS (ref 0–0)
P AXIS: 2 DEGREES
P AXIS: 70 DEGREES
P OFFSET: 196 MS
P OFFSET: 198 MS
P ONSET: 137 MS
P ONSET: 140 MS
PLATELET # BLD AUTO: 355 X10*3/UL (ref 150–450)
POTASSIUM SERPL-SCNC: 3.9 MMOL/L (ref 3.5–5.3)
PR INTERVAL: 148 MS
PR INTERVAL: 160 MS
PROT SERPL-MCNC: 5.8 G/DL (ref 6.4–8.2)
Q ONSET: 214 MS
Q ONSET: 217 MS
QRS COUNT: 14 BEATS
QRS COUNT: 15 BEATS
QRS DURATION: 124 MS
QRS DURATION: 134 MS
QT INTERVAL: 378 MS
QT INTERVAL: 402 MS
QTC CALCULATION(BAZETT): 472 MS
QTC CALCULATION(BAZETT): 489 MS
QTC FREDERICIA: 439 MS
QTC FREDERICIA: 458 MS
R AXIS: -51 DEGREES
R AXIS: 109 DEGREES
RBC # BLD AUTO: 3.42 X10*6/UL (ref 4–5.2)
RIGHT VENTRICLE PEAK SYSTOLIC PRESSURE: 70.9 MMHG
SODIUM SERPL-SCNC: 127 MMOL/L (ref 136–145)
SODIUM UR-SCNC: 47 MMOL/L
SODIUM/CREAT UR-RTO: 48 MMOL/G CREAT
T AXIS: 51 DEGREES
T AXIS: 57 DEGREES
T OFFSET: 403 MS
T OFFSET: 418 MS
TRICUSPID ANNULAR PLANE SYSTOLIC EXCURSION: 2.4 CM
TSH SERPL-ACNC: 1.46 MIU/L (ref 0.44–3.98)
UFH PPP CHRO-ACNC: 0.5 IU/ML (ref ?–1.1)
UFH PPP CHRO-ACNC: 0.6 IU/ML (ref ?–1.1)
VENTRICULAR RATE: 89 BPM
VENTRICULAR RATE: 94 BPM
WBC # BLD AUTO: 20.3 X10*3/UL (ref 4.4–11.3)

## 2025-05-12 PROCEDURE — 2500000001 HC RX 250 WO HCPCS SELF ADMINISTERED DRUGS (ALT 637 FOR MEDICARE OP): Performed by: NURSE PRACTITIONER

## 2025-05-12 PROCEDURE — 2500000001 HC RX 250 WO HCPCS SELF ADMINISTERED DRUGS (ALT 637 FOR MEDICARE OP): Performed by: STUDENT IN AN ORGANIZED HEALTH CARE EDUCATION/TRAINING PROGRAM

## 2025-05-12 PROCEDURE — 80053 COMPREHEN METABOLIC PANEL: CPT | Performed by: NURSE PRACTITIONER

## 2025-05-12 PROCEDURE — 2500000002 HC RX 250 W HCPCS SELF ADMINISTERED DRUGS (ALT 637 FOR MEDICARE OP, ALT 636 FOR OP/ED): Performed by: STUDENT IN AN ORGANIZED HEALTH CARE EDUCATION/TRAINING PROGRAM

## 2025-05-12 PROCEDURE — 93306 TTE W/DOPPLER COMPLETE: CPT | Performed by: INTERNAL MEDICINE

## 2025-05-12 PROCEDURE — 36415 COLL VENOUS BLD VENIPUNCTURE: CPT | Performed by: NURSE PRACTITIONER

## 2025-05-12 PROCEDURE — 84443 ASSAY THYROID STIM HORMONE: CPT | Performed by: STUDENT IN AN ORGANIZED HEALTH CARE EDUCATION/TRAINING PROGRAM

## 2025-05-12 PROCEDURE — 83930 ASSAY OF BLOOD OSMOLALITY: CPT | Mod: ELYLAB | Performed by: STUDENT IN AN ORGANIZED HEALTH CARE EDUCATION/TRAINING PROGRAM

## 2025-05-12 PROCEDURE — 85025 COMPLETE CBC W/AUTO DIFF WBC: CPT | Performed by: NURSE PRACTITIONER

## 2025-05-12 PROCEDURE — 85520 HEPARIN ASSAY: CPT | Performed by: EMERGENCY MEDICINE

## 2025-05-12 PROCEDURE — 2500000004 HC RX 250 GENERAL PHARMACY W/ HCPCS (ALT 636 FOR OP/ED): Mod: JZ | Performed by: EMERGENCY MEDICINE

## 2025-05-12 PROCEDURE — 1200000002 HC GENERAL ROOM WITH TELEMETRY DAILY

## 2025-05-12 PROCEDURE — 99233 SBSQ HOSP IP/OBS HIGH 50: CPT | Performed by: STUDENT IN AN ORGANIZED HEALTH CARE EDUCATION/TRAINING PROGRAM

## 2025-05-12 PROCEDURE — 51701 INSERT BLADDER CATHETER: CPT

## 2025-05-12 PROCEDURE — 93306 TTE W/DOPPLER COMPLETE: CPT

## 2025-05-12 PROCEDURE — 36415 COLL VENOUS BLD VENIPUNCTURE: CPT | Performed by: EMERGENCY MEDICINE

## 2025-05-12 PROCEDURE — 83735 ASSAY OF MAGNESIUM: CPT | Performed by: NURSE PRACTITIONER

## 2025-05-12 PROCEDURE — 2500000004 HC RX 250 GENERAL PHARMACY W/ HCPCS (ALT 636 FOR OP/ED): Mod: JZ | Performed by: STUDENT IN AN ORGANIZED HEALTH CARE EDUCATION/TRAINING PROGRAM

## 2025-05-12 PROCEDURE — 2500000005 HC RX 250 GENERAL PHARMACY W/O HCPCS: Performed by: EMERGENCY MEDICINE

## 2025-05-12 RX ORDER — LEVOTHYROXINE SODIUM 88 UG/1
88 TABLET ORAL DAILY
Status: DISCONTINUED | OUTPATIENT
Start: 2025-05-12 | End: 2025-05-15 | Stop reason: HOSPADM

## 2025-05-12 RX ORDER — SERTRALINE HYDROCHLORIDE 50 MG/1
50 TABLET, FILM COATED ORAL DAILY
Status: DISCONTINUED | OUTPATIENT
Start: 2025-05-12 | End: 2025-05-15 | Stop reason: HOSPADM

## 2025-05-12 RX ORDER — FLUTICASONE FUROATE AND VILANTEROL 100; 25 UG/1; UG/1
1 POWDER RESPIRATORY (INHALATION)
Status: DISCONTINUED | OUTPATIENT
Start: 2025-05-13 | End: 2025-05-15 | Stop reason: HOSPADM

## 2025-05-12 RX ORDER — MAGNESIUM SULFATE HEPTAHYDRATE 40 MG/ML
4 INJECTION, SOLUTION INTRAVENOUS ONCE
Status: COMPLETED | OUTPATIENT
Start: 2025-05-12 | End: 2025-05-12

## 2025-05-12 RX ORDER — VIT C/E/ZN/COPPR/LUTEIN/ZEAXAN 250MG-90MG
250 CAPSULE ORAL DAILY
Status: DISCONTINUED | OUTPATIENT
Start: 2025-05-12 | End: 2025-05-15 | Stop reason: HOSPADM

## 2025-05-12 RX ORDER — SIMVASTATIN 40 MG/1
40 TABLET, FILM COATED ORAL NIGHTLY
Status: DISCONTINUED | OUTPATIENT
Start: 2025-05-12 | End: 2025-05-15 | Stop reason: HOSPADM

## 2025-05-12 RX ADMIN — MAGNESIUM SULFATE HEPTAHYDRATE 4 G: 40 INJECTION, SOLUTION INTRAVENOUS at 09:22

## 2025-05-12 RX ADMIN — SIMVASTATIN 40 MG: 40 TABLET, FILM COATED ORAL at 02:27

## 2025-05-12 RX ADMIN — LEVOTHYROXINE SODIUM 88 MCG: 0.09 TABLET ORAL at 05:49

## 2025-05-12 RX ADMIN — Medication 250 MCG: at 09:19

## 2025-05-12 RX ADMIN — Medication 2 L/MIN: at 09:41

## 2025-05-12 RX ADMIN — SERTRALINE HYDROCHLORIDE 50 MG: 50 TABLET, FILM COATED ORAL at 09:20

## 2025-05-12 RX ADMIN — PANTOPRAZOLE SODIUM 40 MG: 40 TABLET, DELAYED RELEASE ORAL at 05:49

## 2025-05-12 RX ADMIN — HEPARIN SODIUM 1600 UNITS/HR: 10000 INJECTION, SOLUTION INTRAVENOUS at 08:24

## 2025-05-12 RX ADMIN — SIMVASTATIN 40 MG: 40 TABLET, FILM COATED ORAL at 21:15

## 2025-05-12 RX ADMIN — Medication 3 L/MIN: at 22:15

## 2025-05-12 ASSESSMENT — COGNITIVE AND FUNCTIONAL STATUS - GENERAL
TURNING FROM BACK TO SIDE WHILE IN FLAT BAD: A LOT
HELP NEEDED FOR BATHING: A LOT
MOVING FROM LYING ON BACK TO SITTING ON SIDE OF FLAT BED WITH BEDRAILS: A LOT
DRESSING REGULAR UPPER BODY CLOTHING: A LOT
DRESSING REGULAR LOWER BODY CLOTHING: A LOT
EATING MEALS: A LITTLE
STANDING UP FROM CHAIR USING ARMS: A LOT
MOBILITY SCORE: 12
DAILY ACTIVITIY SCORE: 14
EATING MEALS: A LITTLE
MOVING TO AND FROM BED TO CHAIR: A LOT
MOBILITY SCORE: 12
PERSONAL GROOMING: A LITTLE
MOVING FROM LYING ON BACK TO SITTING ON SIDE OF FLAT BED WITH BEDRAILS: A LOT
DRESSING REGULAR UPPER BODY CLOTHING: A LOT
PERSONAL GROOMING: A LITTLE
DAILY ACTIVITIY SCORE: 14
MOVING TO AND FROM BED TO CHAIR: A LOT
WALKING IN HOSPITAL ROOM: A LOT
TOILETING: A LOT
STANDING UP FROM CHAIR USING ARMS: A LOT
HELP NEEDED FOR BATHING: A LOT
CLIMB 3 TO 5 STEPS WITH RAILING: A LOT
TOILETING: A LOT
CLIMB 3 TO 5 STEPS WITH RAILING: A LOT
TURNING FROM BACK TO SIDE WHILE IN FLAT BAD: A LOT
DRESSING REGULAR LOWER BODY CLOTHING: A LOT
WALKING IN HOSPITAL ROOM: A LOT

## 2025-05-12 ASSESSMENT — PAIN SCALES - GENERAL
PAINLEVEL_OUTOF10: 0 - NO PAIN
PAINLEVEL_OUTOF10: 0 - NO PAIN

## 2025-05-12 ASSESSMENT — PAIN SCALES - WONG BAKER: WONGBAKER_NUMERICALRESPONSE: NO HURT

## 2025-05-12 ASSESSMENT — PAIN - FUNCTIONAL ASSESSMENT: PAIN_FUNCTIONAL_ASSESSMENT: 0-10

## 2025-05-12 NOTE — PROGRESS NOTES
Physical Therapy                 Therapy Communication Note    Patient Name: Brianne Mallory  MRN: [id number]  Department: [facility]  Room: [address] [place], [place] [address]-A  Today's Date: 5/12/2025     Discipline: Physical Therapy        Comment:  PT evaluation attempt x2:   9:57am- patient is off floor for a test  Re-attempt at 12:18 PM- patient pending hospice meeting at this time. Per TCC, hold therapy evaluation until patient/family meet with Hospice and determine care plan. RN notified. Will follow up as appropriate.

## 2025-05-12 NOTE — CONSULTS
Nutrition Progress Note    RD consulted for MST = 3 and nutrition assessment/recommendation. Noted hospice consulted. Full assessment deferred at this time. Informed RN to re-consult RD if plan of care changes and need for RD identified. If plans for hospice, recommend comfort feedings as tolerated.

## 2025-05-12 NOTE — CARE PLAN
The patient's goals for the shift include      The clinical goals for the shift include safety    Over the shift, the patient did not make progress toward the following goals. Barriers to progression include cognition. Recommendations to address these barriers include continue with poc and frequent rounding.

## 2025-05-12 NOTE — PROGRESS NOTES
05/12/25 1249   Discharge Planning   Living Arrangements Alone   Support Systems Children;Friends/neighbors   Type of Residence Private residence   Who is requesting discharge planning? Provider   Expected Discharge Disposition   (TBD/ Hospice referral & meeting sched for today  130-2pm)   Patient Choice   Provider Choice list and CMS website (https://medicare.gov/care-compare#search) for post-acute Quality and Resource Measure Data were provided and reviewed with: Family;Patient   Intensity of Service   Intensity of Service 0-30 min     Discussed pt with hospitalist this am. After discussion with pt family consult was made to RAVINDER for Hospice . Meeting scheduled , discharge plan TBD.   3pm late entry ; Notified by hospitalist that after meeting with Hospice, family want to try to see if pt qualifies for Skilled care for a couple weeks & see how she does & to get things in order at home, if qualifies .  PT/OT eval ord.  If does not qualify plan would be home with Hospice. Updated Jolly CASTELLON.

## 2025-05-12 NOTE — PROGRESS NOTES
05/12/25 1011   Discharge Planning   Home or Post Acute Services In home services   Type of Home Care Services Hospice  (Agency of choice is [facility])     RAVINDER notified of hospice consult. RAVINDER spoke with pt daughter Danya. Danya states her father was with [facility] in [facility] before he passed and she would like to utilize this agency again. RAVINDER [facility]     Update- RAVINDER notified that consult meeting is scheduled for today between 1:30 and 2pm.

## 2025-05-12 NOTE — PROGRESS NOTES
Occupational Therapy                 Therapy Communication Note    Patient Name: Brianne Mallory  MRN: [id number]  Department: [facility]  Room: [facility]-A  Today's Date: 5/12/2025     Discipline: Occupational Therapy    OT Missed Visit: Yes     Missed Visit Reason: Missed Visit Reason:  (Patient off the floor.)    Missed Time: Attempt    Comment: Attempted OT evaluation x2.   Attempted at 9:57 AM - patient off the floor for a test.  Re attempted at 12: 18 PM - patient pending Hospice meeting this date. Per TCC, hold therapy evaluation until patient/family meet with Hospice, determine care plan. Notified RN. Will re attempt as appropriate.

## 2025-05-12 NOTE — CARE PLAN
The patient's goals for the shift include      The clinical goals for the shift include safety    Over the shift, the patient did not make progress toward the following goals. Barriers to progression include . Recommendations to address these barriers include   Problem: Fall/Injury  Goal: Not fall by end of shift  Outcome: Progressing  Goal: Be free from injury by end of the shift  Outcome: Progressing  Goal: Verbalize understanding of personal risk factors for fall in the hospital  Outcome: Progressing  Goal: Verbalize understanding of risk factor reduction measures to prevent injury from fall in the home  Outcome: Progressing  Goal: Use assistive devices by end of the shift  Outcome: Progressing  Goal: Pace activities to prevent fatigue by end of the shift  Outcome: Progressing     Problem: Pain - Adult  Goal: Verbalizes/displays adequate comfort level or baseline comfort level  Outcome: Progressing     Problem: Safety - Adult  Goal: Free from fall injury  Outcome: Progressing     Problem: Discharge Planning  Goal: Discharge to home or other facility with appropriate resources  Outcome: Progressing     Problem: Chronic Conditions and Co-morbidities  Goal: Patient's chronic conditions and co-morbidity symptoms are monitored and maintained or improved  Outcome: Progressing     Problem: Nutrition  Goal: Nutrient intake appropriate for maintaining nutritional needs  Outcome: Progressing   .

## 2025-05-12 NOTE — ED PROCEDURE NOTE
Procedure  Critical Care    Performed by: Wesley CAMARA MD  Authorized by: Wesley CAMARA MD    Critical care provider statement:     Critical care time (minutes):  76    Critical care time was exclusive of:  Separately billable procedures and treating other patients    Critical care was necessary to treat or prevent imminent or life-threatening deterioration of the following conditions:  Respiratory failure (PE requiring heparin drip)    Critical care was time spent personally by me on the following activities:  Blood draw for specimens, development of treatment plan with patient or surrogate, discussions with primary provider, evaluation of patient's response to treatment, examination of patient, obtaining history from patient or surrogate, ordering and performing treatments and interventions, ordering and review of laboratory studies, ordering and review of radiographic studies, pulse oximetry, re-evaluation of patient's condition and review of old charts    Care discussed with: admitting provider                 Wesley CAMARA MD  05/11/25 2001

## 2025-05-12 NOTE — H&P
Medical Group History and Physical    ASSESSMENT & PLAN:       Acute Left - Pulmonary Emboli  Acute respiratory failure with hypoxia  Abnormal CT - progressive metastatic disease ?  Hx: NSCLC s/p GERTRUDE VATs and total RUL lobectomy [01/2021]     - Consult pulmonary for abnormal CT findings - metastatic disease progressing ?   -  Thoracic surgery consult ? versus Hospice ?  - Heparin infusion  - Complete Echo [TTE ordered]   - Trop negative [7_6], EKG reviewed  - UA negative  - Monitor on telemetry overnight  - Daily EKG      NEW Hyponatremia [127]  Hypomagnesemia [1.36]  Leukocytosis [23.6]  Suspect d/t abnormal CT findings - possible progression of NSCLC ?  - Consult TCC - will need to update code status and POA/living will paperwork; may need hospice services - pending prognosis  - Consult PT/OT  - consult dietitian   - Baseline mentation is A+O x3, now A+O x2 - family reports increased confusion and functional decline over the last 2 weeks, patient lives independently in the community.  - Trend labs in a.m.   - BC x2 follow results  - Replace/recheck Mag   - bladder scan if no output every shift       POA daughter   -  Danya Garcia  [phone]    -  Available anytime after 12p to speak with TCC, Attending, and pulmonary  - CODE STATUS is DNR/DNI  confirmed at bedside with patient and family - they may consider DNR comfort care if deemed to have a poor prognosis       VTE Prophylaxis: cont heparin infusion    Josefina Jasmine, APRN-CNP    HISTORY OF PRESENT ILLNESS:   Chief Complaint: shortness of breath    History Of Present Illness:    Brianne Mallory is a 80 y.o. female with a significant past medical history of GERTRUDE NSCLC s/p VATs w GERTRUDE complete lobectomy [01/2021], HTN, HLD, hypothyroid, syncope and remote hx of DVT and PE [facility] with c/o shortness of breath, noted to be mildly hypoxic on arrival with SpO2 88% on RA and was started on 2L NC with good response. CTA chest showing Acute  segmental and subsegmental PE of the left lung with straightening of interventricular septum suggestive of possible right heart strain. Please correlate with echo. Increased size right hilar and new upper mediastinal masses or nodes concerning for recurrent neoplasm. New nonspecific prominent left axillary lymph node. Attention on follow-up examination. Also noting severe LAD and mild right main disease.      Most pt history was obtained from the daughters at bedside, they state around Easter time daughters went to [place] on vacation and upon their return mother has had a sharp decline in her mental, functional, and nutritional status.  Normally patient lives independently in the community alone, daughters visit daily, poor p.o. intake and decreased urine output over the last 48 hours change in mentation and increased confusion, and patient is now sleeping in her chair with frequent coughing.  No evidence of acute infection.  She is hard of hearing.  Recent medication changes include increasing Zoloft from 25-50 daily.  Daughters are aware of patient's lung cancer and have been concerned that it may be progressing.  Patient had repeat chest CT ordered for tomorrow, will consult pulmonary for recommendations on prognosis given abnormal findings on today's CT.      VSS, started on Heparin infusion, and admitted under medicine service for further evaluation/recommendations.    Review of systems: 10 point review of systems is otherwise negative except as mentioned above.    PAST HISTORIES:       Past Medical History:  Medical Problems       Problem List       * (Principal) Other pulmonary embolism without acute cor pulmonale    Overview Signed 5/11/2025  8:00 PM by CHEYANNE Crooks-CNP   Heparin infusion started;p prior hx of PE and DVT w/in the past last year          Weakness    Vitamin D deficiency    Syncope and collapse    Status post total left knee replacement    Osteoarthritis of left knee    Hypothyroid     Depression    Edema of lower extremity    Essential hypertension    History of malignant neoplasm of thoracic cavity structure    History of pulmonary embolism    HLD (hyperlipidemia)    Bradycardia    B12 deficiency    Anemia due to blood loss    Memory changes    Deep vein thrombosis (DVT) of right lower extremity    Malignant neoplasm of upper lobe of left lung (Multi)    Lung nodule    Acute hyponatremia    Overview Signed 5/11/2025  8:00 PM by Josefina Jasmine, APRN-CNP   New hyponatremia ISO acute PE and new lung nodules         Pulmonary embolism on left (Multi)    Mass of upper lobe of right lung           Past Surgical History:  Surgical History[1]       Social History:  She reports that she has quit smoking. Her smoking use included cigarettes. She started smoking about 6 years ago. She has a 6 pack-year smoking history. She has been exposed to tobacco smoke. She has never used smokeless tobacco. She reports that she does not currently use alcohol. She reports that she does not use drugs.    Family History:  Family History[2]     Allergies:  Aspirin, Ibuprofen, Nickel, Sulfa (sulfonamide antibiotics), and Penicillins    OBJECTIVE:       Last Recorded Vitals:  Vitals:    05/11/25 1739 05/11/25 1800 05/11/25 1858 05/11/25 2000   BP: 123/58  109/64    BP Location:   Right arm    Patient Position:   Lying    Pulse: 84 86 88 94   Resp: 16 20 18    Temp:   36.6 °C (97.9 °F)    SpO2: 94% (!) 92% 94% 95%   Weight:       Height:           Last I/O:  No intake/output data recorded.    Physical Exam  Vitals and nursing note reviewed.   Constitutional:       Appearance: She is normal weight. She is ill-appearing.   HENT:      Head: Normocephalic and atraumatic.      Nose: Nose normal.      Mouth/Throat:      Mouth: Mucous membranes are dry.      Pharynx: Oropharynx is clear.   Eyes:      Extraocular Movements: Extraocular movements intact.      Conjunctiva/sclera: Conjunctivae normal.      Pupils: Pupils are equal,  round, and reactive to light.   Neck:      Comments: No JVD  Cardiovascular:      Rate and Rhythm: Normal rate and regular rhythm.      Pulses: Normal pulses.      Heart sounds: Normal heart sounds.   Pulmonary:      Effort: Pulmonary effort is normal.      Breath sounds: Normal breath sounds.      Comments: 2L NC  Abdominal:      General: Abdomen is flat. Bowel sounds are normal.      Palpations: Abdomen is soft.   Genitourinary:     Comments: Not assessed  Musculoskeletal:         General: Normal range of motion.      Cervical back: Normal range of motion.      Comments: No edema   Skin:     General: Skin is warm and dry.      Capillary Refill: Capillary refill takes less than 2 seconds.   Neurological:      General: No focal deficit present.      Mental Status: She is alert. Mental status is at baseline. She is disoriented.      Motor: Weakness present.      Comments: General confusion, A+O x2, pleasant and cooperative   Psychiatric:         Mood and Affect: Mood normal.         Behavior: Behavior normal.         Thought Content: Thought content normal.         Judgment: Judgment normal.           Scheduled Medications  Scheduled Medications[3]  PRN Medications  PRN Medications[4]  Continuous Medications  Continuous Medications[5]    Outpatient Medications:  Prior to Admission medications    Medication Sig Start Date End Date Taking? Authorizing Provider   acetaminophen (Tylenol) 325 mg tablet Take 2 tablets (650 mg) by mouth every 4 hours. 10/25/24   Shy Angel, APRN-CNP   calcium citrate-vitamin D2 250 mg-2.5 mcg (100 unit) tablet Take 1 tablet by mouth once daily.    Historical Provider, MD   cyanocobalamin (Vitamin B-12) 250 mcg tablet Take 1 tablet (250 mcg) by mouth once daily.    Historical Provider, MD   levothyroxine (Synthroid, Levoxyl) 88 mcg tablet Take 1 tablet (88 mcg) by mouth early in the morning.. 12/6/20   Historical Provider, MD   lidocaine 4 % patch Place 2 patches over 12 hours on the  skin once daily. Remove & discard patch within 12 hours or as directed by MD.  Patient not taking: Reported on 12/2/2024 10/26/24   MAGUE Sweeney   pantoprazole (ProtoNix) 40 mg EC tablet Take 1 tablet (40 mg) by mouth once daily in the morning. Take before meals. Do not crush, chew, or split.  Patient not taking: Reported on 10/22/2024 8/21/24 9/20/24  Herb Kramer MD, MS   sertraline (Zoloft) 25 mg tablet Take 1 tablet (25 mg) by mouth once daily. 1/15/24   Historical Provider, MD   simvastatin (Zocor) 40 mg tablet Take 1 tablet (40 mg) by mouth once daily at bedtime. 12/6/20   Historical Provider, MD   traMADol (Ultram) 50 mg tablet Take 1 tablet (50 mg) by mouth every 6 hours if needed (pain).  Patient not taking: Reported on 12/2/2024 10/25/24   MAGUE Sweeney       LABS AND IMAGING:     Labs:  Results for orders placed or performed during the hospital encounter of 05/11/25 (from the past 24 hours)   CBC and Auto Differential   Result Value Ref Range    WBC 23.6 (H) 4.4 - 11.3 x10*3/uL    nRBC 0.0 0.0 - 0.0 /100 WBCs    RBC 4.04 4.00 - 5.20 x10*6/uL    Hemoglobin 11.2 (L) 12.0 - 16.0 g/dL    Hematocrit 34.6 (L) 36.0 - 46.0 %    MCV 86 80 - 100 fL    MCH 27.7 26.0 - 34.0 pg    MCHC 32.4 32.0 - 36.0 g/dL    RDW 15.4 (H) 11.5 - 14.5 %    Platelets 431 150 - 450 x10*3/uL    Neutrophils % 87.8 40.0 - 80.0 %    Immature Granulocytes %, Automated 1.7 (H) 0.0 - 0.9 %    Lymphocytes % 3.7 13.0 - 44.0 %    Monocytes % 5.8 2.0 - 10.0 %    Eosinophils % 0.7 0.0 - 6.0 %    Basophils % 0.3 0.0 - 2.0 %    Neutrophils Absolute 20.71 (H) 1.60 - 5.50 x10*3/uL    Immature Granulocytes Absolute, Automated 0.40 0.00 - 0.50 x10*3/uL    Lymphocytes Absolute 0.88 0.80 - 3.00 x10*3/uL    Monocytes Absolute 1.37 (H) 0.05 - 0.80 x10*3/uL    Eosinophils Absolute 0.17 0.00 - 0.40 x10*3/uL    Basophils Absolute 0.06 0.00 - 0.10 x10*3/uL   Comprehensive Metabolic Panel   Result Value Ref Range    Glucose 110 (H) 74  - 99 mg/dL    Sodium 127 (L) 136 - 145 mmol/L    Potassium 4.0 3.5 - 5.3 mmol/L    Chloride 94 (L) 98 - 107 mmol/L    Bicarbonate 23 21 - 32 mmol/L    Anion Gap 14 10 - 20 mmol/L    Urea Nitrogen 18 6 - 23 mg/dL    Creatinine 0.71 0.50 - 1.05 mg/dL    eGFR 85 >60 mL/min/1.73m*2    Calcium 10.9 (H) 8.6 - 10.3 mg/dL    Albumin 3.4 3.4 - 5.0 g/dL    Alkaline Phosphatase 90 33 - 136 U/L    Total Protein 7.0 6.4 - 8.2 g/dL    AST 19 9 - 39 U/L    Bilirubin, Total 0.4 0.0 - 1.2 mg/dL    ALT 11 7 - 45 U/L   Lipase   Result Value Ref Range    Lipase 22 9 - 82 U/L   Magnesium   Result Value Ref Range    Magnesium 1.36 (L) 1.60 - 2.40 mg/dL   B-Type Natriuretic Peptide   Result Value Ref Range    BNP 83 0 - 99 pg/mL   Protime-INR   Result Value Ref Range    Protime 14.4 (H) 9.8 - 12.4 seconds    INR 1.3 (H) 0.9 - 1.1   Lactate   Result Value Ref Range    Lactate 1.5 0.4 - 2.0 mmol/L   Troponin I, High Sensitivity, Initial   Result Value Ref Range    Troponin I, High Sensitivity 7 0 - 13 ng/L   ECG 12 Lead   Result Value Ref Range    Ventricular Rate 94 BPM    Atrial Rate 94 BPM    IL Interval 148 ms    QRS Duration 124 ms    QT Interval 378 ms    QTC Calculation(Bazett) 472 ms    P Axis 70 degrees    R Axis -51 degrees    T Axis 57 degrees    QRS Count 15 beats    Q Onset 214 ms    P Onset 140 ms    P Offset 196 ms    T Offset 403 ms    QTC Fredericia 439 ms   Sars-CoV-2 PCR   Result Value Ref Range    Coronavirus 2019, PCR Not Detected Not Detected   Influenza A, and B PCR   Result Value Ref Range    Flu A Result Not Detected Not Detected    Flu B Result Not Detected Not Detected   RSV PCR   Result Value Ref Range    RSV PCR Not Detected Not Detected   Blood Gas Arterial   Result Value Ref Range    POCT pH, Arterial 7.45 (H) 7.38 - 7.42 pH    POCT pCO2, Arterial 33 (L) 38 - 42 mm Hg    POCT pO2, Arterial 63 (L) 85 - 95 mm Hg    POCT SO2, Arterial 93 (L) 94 - 100 %    POCT Oxy Hemoglobin, Arterial 90.6 (L) 94.0 - 98.0 %     POCT Base Excess, Arterial -0.4 -2.0 - 3.0 mmol/L    POCT HCO3 Calculated, Arterial 22.9 22.0 - 26.0 mmol/L    Patient Temperature      FiO2 28 %    Site of Arterial Puncture Radial Right     Ramón's Test Positive     Site of Arterial Puncture RRAD     Ramón's Test YES    Troponin, High Sensitivity, 1 Hour   Result Value Ref Range    Troponin I, High Sensitivity 6 0 - 13 ng/L   ECG 12 Lead   Result Value Ref Range    Ventricular Rate 89 BPM    Atrial Rate 89 BPM    ME Interval 160 ms    QRS Duration 134 ms    QT Interval 402 ms    QTC Calculation(Bazett) 489 ms    P Axis 2 degrees    R Axis 109 degrees    T Axis 51 degrees    QRS Count 14 beats    Q Onset 217 ms    P Onset 137 ms    P Offset 198 ms    T Offset 418 ms    QTC Fredericia 458 ms   Urinalysis with Reflex Culture and Microscopic   Result Value Ref Range    Color, Urine Yellow Light-Yellow, Yellow, Dark-Yellow    Appearance, Urine Clear Clear    Specific Gravity, Urine 1.032 1.005 - 1.035    pH, Urine 5.5 5.0, 5.5, 6.0, 6.5, 7.0, 7.5, 8.0    Protein, Urine NEGATIVE NEGATIVE, 10 (TRACE), 20 (TRACE) mg/dL    Glucose, Urine Normal Normal mg/dL    Blood, Urine NEGATIVE NEGATIVE mg/dL    Ketones, Urine NEGATIVE NEGATIVE mg/dL    Bilirubin, Urine NEGATIVE NEGATIVE mg/dL    Urobilinogen, Urine Normal Normal mg/dL    Nitrite, Urine NEGATIVE NEGATIVE    Leukocyte Esterase, Urine NEGATIVE NEGATIVE        Imaging:  CT angio chest for pulmonary embolism   Final Result   Acute segmental and subsegmental PE of the left lung with   straightening of interventricular septum suggestive of possible right   heart strain. Please correlate with echocardiography.        Increased size right hilar and new upper mediastinal masses or nodes   concerning for recurrent neoplasm. New nonspecific prominent left   axillary lymph node. Attention on follow-up examination.        No acute abdomen and pelvic process.        TIANNA Fonseca discussed the significance and urgency of  this   critical finding by Epic secure chat with  JAQUAN CARMONANAHID on   5/11/2025 at 7:44 pm.  (**-RCF-**) Findings:  See findings.        Signed by: Aquiles Fonseca 5/11/2025 7:46 PM   Dictation workstation:   [id number]      CT abdomen pelvis w IV contrast   Final Result   Acute segmental and subsegmental PE of the left lung with   straightening of interventricular septum suggestive of possible right   heart strain. Please correlate with echocardiography.        Increased size right hilar and new upper mediastinal masses or nodes   concerning for recurrent neoplasm. New nonspecific prominent left   axillary lymph node. Attention on follow-up examination.        No acute abdomen and pelvic process.        MACRO   Aquiles Fonseca discussed the significance and urgency of this   critical finding by Epic secure chat with  JAQUAN ROMAN on   5/11/2025 at 7:44 pm.  (**-RCF-**) Findings:  See findings.        Signed by: Aquiles Fonseca 5/11/2025 7:46 PM   Dictation workstation:   [id number]      CT head wo IV contrast   Final Result   No CT evidence of acute intracranial abnormality.        Chronic microvascular ischemic change and parenchymal atrophy.        MACRO   None        Signed by: Aquiles Fonseca 5/11/2025 7:18 PM   Dictation workstation:   [id number]      XR chest 1 view   Final Result   Small left effusion suspected.        MACRO:   None        Signed by: Sudeep Francis 5/11/2025 6:19 PM   Dictation workstation:   [id number]                [1]   Past Surgical History:  Procedure Laterality Date    BRONCHOSCOPY      CATARACT EXTRACTION      JOINT REPLACEMENT      KNEE ARTHROPLASTY      right an dleft    OTHER SURGICAL HISTORY  12/28/2020    Biopsy    OTHER SURGICAL HISTORY Left 01/21/2021    GERTRUDE Lung lobectomy   [2]   Family History  Problem Relation Name Age of Onset    Hypertension Mother      Heart attack Father      Lung cancer Maternal Grandmother      Diabetes Paternal Grandmother     [3] heparin, 80  Units/kg, intravenous, Once  oxygen, , inhalation, Continuous - Inhalation  sodium chloride, 1,000 mL, intravenous, Once    [4] PRN medications: heparin  [5] heparin, 0-4,500 Units/hr

## 2025-05-12 NOTE — PROGRESS NOTES
Medical Group Progress Note  ASSESSMENT & PLAN:        Acute provoked classifying this as a provoked PE with patient's left segmental and subsegmental PE  - Hypercoagulable state with her history of adenocarcinoma of the lungs  - CTA chest reviewed  - Echocardiogram completed today with moderately enlarged RV with reduced RV SF consistent with cor pulmonale  - Heparin infusion for 48 hours of heparin infusion from 5/11 to 5/13  - Transition to apixaban 10 mg twice daily x 7 days followed by 5 mg twice daily     Adenocarcinoma of the lung status post bilateral upper lobe resection  - Previous history of bilateral upper lobes that were resected, patient has declined chemotherapy and has not seen oncology since late 2024  - CTA chest showing new right-sided nodules and masses consistent with recurrence of disease  - No longer seeking care or treatment at this time  - Hospice following    Hyponatremia  - Likely SIADH, with history of cancer  - Sodium 127 on 5/11 and 12   - Follow-up hyponatremia workup including urine sodium urine osmolality serum osmolality TSH cortisol (added on to today's labs)    Hypomagnesemia    Leukocytosis, reactive to above  - No focal consolidation or source of infection, hold off on antibiotics    Generalized anxiety  Hyperlipidemia  Hypothyroidism  Essential hypertension  Continue home medications as reconciled    Had a lengthy discussion with patient's 2 daughters, son, grandson at bedside along with hospice.  Will have PT/OT work with patient and attempt for skilled nursing facility placement.      If patient does not qualify, family aware that they will need to arrange for home hospice with 24/7 care with family for discharge likely on 5/13-5/14, otherwise discharge to SNF once pre-CERT is received and then transition to hospice.      VTE Prophylaxis: Heparin infusion        ---Of note, this documentation is completed using the Dragon Dictation system (voice recognition software).  There may be spelling and/or grammatical errors that were not corrected prior to final submission.---      Alfred Martinez MD    SUBJECTIVE     Patient was seen at bedside this morning.  Had no acute events overnight.  This morning did have some complaints of right shoulder pain otherwise had no other complaints for me.    Seen later in the afternoon with family at bedside.  In agreement for potential hospice disposition.    OBJECTIVE:     Last Recorded Vitals:  Vitals:    05/12/25 0225 05/12/25 0523 05/12/25 0730 05/12/25 1539   BP: 118/58 107/58 117/58 117/60   BP Location: Right arm  Right arm Right arm   Patient Position: Lying  Lying Lying   Pulse: 89 81 77 85   Resp: 16 16 16 20   Temp: 36.4 °C (97.5 °F) 36.7 °C (98.1 °F) 36.3 °C (97.3 °F) 37.1 °C (98.8 °F)   TempSrc:   Temporal Temporal   SpO2: 94% 94% 95% 90%   Weight:       Height:         Last I/O:  I/O last 3 completed shifts:  In: 1375.3 (16.6 mL/kg) [I.V.:375.3 (4.5 mL/kg); IV Piggyback:1000]  Out: - (0 mL/kg)   Weight: 83.1 kg     Physical Exam  Vitals reviewed.   Constitutional:       General: She is not in acute distress.     Appearance: Normal appearance. She is not toxic-appearing.   HENT:      Head: Normocephalic and atraumatic.   Eyes:      Extraocular Movements: Extraocular movements intact.      Conjunctiva/sclera: Conjunctivae normal.   Cardiovascular:      Rate and Rhythm: Normal rate and regular rhythm.      Pulses: Normal pulses.      Heart sounds: Normal heart sounds.   Pulmonary:      Effort: Pulmonary effort is normal. No respiratory distress.      Breath sounds: Normal breath sounds. No wheezing.   Abdominal:      General: Bowel sounds are normal. There is no distension.      Palpations: Abdomen is soft.      Tenderness: There is no abdominal tenderness. There is no guarding.   Musculoskeletal:         General: Normal range of motion.      Cervical back: Normal range of motion and neck supple.   Neurological:      General: No focal  deficit present.      Mental Status: She is alert. Mental status is at baseline.   Psychiatric:         Mood and Affect: Mood normal.         Behavior: Behavior normal.         Thought Content: Thought content normal.           Inpatient Medications:  Scheduled Medications[1]    PRN Medications  PRN Medications[2]    Continuous Medications:  Continuous Medications[3]  LABS AND IMAGING:     Labs:  Results from last 7 days   Lab Units 05/12/25  0413 05/11/25  1719   WBC AUTO x10*3/uL 20.3* 23.6*   RBC AUTO x10*6/uL 3.42* 4.04   HEMOGLOBIN g/dL 9.4* 11.2*   HEMATOCRIT % 28.9* 34.6*   MCV fL 85 86   MCH pg 27.5 27.7   MCHC g/dL 32.5 32.4   RDW % 15.7* 15.4*   PLATELETS AUTO x10*3/uL 355 431     Results from last 7 days   Lab Units 05/12/25  0413 05/11/25  1719   SODIUM mmol/L 127* 127*   POTASSIUM mmol/L 3.9 4.0   CHLORIDE mmol/L 99 94*   CO2 mmol/L 24 23   BUN mg/dL 15 18   CREATININE mg/dL 0.55 0.71   GLUCOSE mg/dL 122* 110*   PROTEIN TOTAL g/dL 5.8* 7.0   CALCIUM mg/dL 9.5 10.9*   BILIRUBIN TOTAL mg/dL 0.3 0.4   ALK PHOS U/L 75 90   AST U/L 15 19   ALT U/L 8 11     Results from last 7 days   Lab Units 05/12/25  0413 05/11/25  1719   MAGNESIUM mg/dL 1.57* 1.36*     Results from last 7 days   Lab Units 05/11/25  1826 05/11/25  1719   TROPHS ng/L 6 7     Imaging:  Transthoracic Echo Complete            [place] [id number]   Tel [phone] Fax [phone]    TRANSTHORACIC ECHOCARDIOGRAM REPORT    Patient Name:       JANELL Lin Physician:    [id number] Joshua Shelby DO  Study Date:         5/12/2025           Ordering Provider:    [id number] HANNAH SAVAGE  MRN/PID:            [id number]            Fellow:  Accession#:         [id number]        Nurse:  Date of Birth/Age:  1945 / 80      Sonographer:          Beata                       jarred Macedo                                                                [facility]  Gender Assigned at  F                   Additional Staff:  Birth:  Height:             170.18 cm           Admit Date:           5/11/2025  Weight:             83.01 kg            Admission Status:     Inpatient -                                                                Routine  BSA / BMI:          1.95 m2 / 28.66     Department Location:  [facility]                      kg/m2                                     Echo Lab  Blood Pressure: 117 /58 mmHg    Study Type:    TRANSTHORACIC ECHO (TTE) COMPLETE  Diagnosis/ICD: Other pulmonary embolism without acute cor pulmonale-I26.99  Indication:    PE  CPT Codes:     Echo Complete w Full Doppler-[id number]    Patient History:  Smoker:            Current.  Pertinent History: PE, HTN, Hyperlipidemia, Previous DVT, Dyspnea and Syncope.    Study Detail: The following Echo studies were performed: 2D, M-Mode, Doppler and                color flow. Technically challenging study due to patient lying in                supine position and Sitting Up.       PHYSICIAN INTERPRETATION:  Left Ventricle: The left ventricular systolic function is normal, with a visually estimated ejection fraction of 60-65%. There is moderate concentric left ventricular hypertrophy. There are no regional wall motion abnormalities. The left ventricular cavity size is normal. There is moderately increased septal and moderately increased posterior left ventricular wall thickness. There is left ventricular concentric remodeling. Spectral Doppler shows a normal pattern of left ventricular diastolic filling.  LV Wall Scoring:  All segments are normal.    Left Atrium: The left atrial size is normal.  Right Ventricle: The right ventricle is moderately enlarged. There is moderately reduced right ventricular systolic function. The findings are consistent with cor pulmonale.  Right Atrium:  The right atrial size is mild to moderately dilated.  Aortic Valve: The aortic valve appears structurally normal. There is no evidence of aortic valve stenosis.  The aortic valve dimensionless index is 0.92. There is no evidence of aortic valve regurgitation. The peak instantaneous gradient of the aortic valve is 10 mmHg. The mean gradient of the aortic valve is 6 mmHg.  Mitral Valve: The mitral valve is normal in structure. There is no evidence of mitral valve stenosis. There is normal mitral valve leaflet mobility. There is mild mitral valve regurgitation.  Tricuspid Valve: The tricuspid valve is structurally normal. There is normal tricuspid valve leaflet mobility. There is moderate to severe tricuspid regurgitation.  Pulmonic Valve: The pulmonic valve is not well visualized. There is no indication of pulmonic valve regurgitation.  Pericardium: No pericardial effusion noted.  Aorta: The aortic root is normal.  Pulmonary Artery: Pulmonary hypertension is present. The tricuspid regurgitant velocity is 4.12 m/s, and with an estimated right atrial pressure of 3 mmHg, the estimated pulmonary artery pressure is severely elevated with the RVSP at 70.9 mmHg.  In comparison to the previous echocardiogram(s): The left ventricular function is unchanged. The left ventricular diastolic function is normal. The right ventricular systolic function is significantly worse.       CONCLUSIONS:   1. The left ventricular systolic function is normal, with a visually estimated ejection fraction of 60-65%.   2. There is moderately reduced right ventricular systolic function.   3. The findings are consistent with cor pulmonale.   4. Moderately enlarged right ventricle.   5. The right atrial size is mild to moderately dilated.   6. Mild mitral valve regurgitation.   7. Moderate to severe tricuspid regurgitation.   8. Aortic valve stenosis is not present.   9. Pulmonary hypertension is present.  10. Severely elevated pulmonary artery  pressure.  11. There is moderately increased septal and moderately increased posterior left ventricular wall thickness.    RECOMMENDATIONS:  Technically suboptimal and limited study, therefore accuracy of above interpretation could be substantially diminished. Clinical correlation is advised. Consider additional imaging modalities if clinically indicated.       QUANTITATIVE DATA SUMMARY:     2D MEASUREMENTS:             Normal Ranges:  Ao Root d:       2.30 cm     (2.0-3.7cm)  IVSd:            1.33 cm     (0.6-1.1cm)  LVPWd:           1.33 cm     (0.6-1.1cm)  LVIDd:           3.56 cm     (3.9-5.9cm)  LVIDs:           2.38 cm  LV Mass Index:   83.9 g/m2  LVEDV Index:     27.44 ml/m2  LV % FS          33.1 %       LEFT ATRIUM:                  Normal Ranges:  LA Vol A4C:        32.3 ml    (22+/-6mL/m2)  LA Vol A2C:        33.8 ml  LA Vol BP:         34.0 ml  LA Vol Index A4C:  16.6ml/m2  LA Vol Index A2C:  17.4 ml/m2  LA Vol Index BP:   17.5 ml/m2  LA Area A4C:       14.2 cm2  LA Area A2C:       14.1 cm2  LA Major Axis A4C: 5.3 cm  LA Major Axis A2C: 5.0 cm  LA Volume Index:   16.1 ml/m2       LV SYSTOLIC FUNCTION:                       Normal Ranges:  EF-A4C View:    66 % (>=55%)  EF-A2C View:    65 %  EF-Biplane:     66 %  EF-Visual:      63 %  LV EF Reported: 63 %       LV DIASTOLIC FUNCTION:           Normal Ranges:  MV Peak E:             0.86 m/s  (0.7-1.2 m/s)  MV Peak A:             0.83 m/s  (0.42-0.7 m/s)  E/A Ratio:             1.03      (1.0-2.2)  MV e'                  0.090 m/s (>8.0)  MV lateral e'          0.11 m/s  MV medial e'           0.07 m/s  E/e' Ratio:            9.53      (<8.0)       MITRAL VALVE:          Normal Ranges:  MV DT:        153 msec (150-240msec)       AORTIC VALVE:                      Normal Ranges:  AoV Vmax:                1.58 m/s  (<=1.7m/s)  AoV Peak PG:             10.0 mmHg (<20mmHg)  AoV Mean P.0 mmHg  (1.7-11.5mmHg)  LVOT Max Noe:            1.13 m/s   (<=1.1m/s)  AoV VTI:                 25.20 cm  (18-25cm)  LVOT VTI:                23.30 cm  LVOT Diameter:           2.00 cm   (1.8-2.4cm)  AoV Area, VTI:           2.90 cm2  (2.5-5.5cm2)  AoV Area,Vmax:           2.25 cm2  (2.5-4.5cm2)  AoV Dimensionless Index: 0.92       RIGHT VENTRICLE:  RV Basal 3.94 cm  RV Mid   2.79 cm  RV Major 6.5 cm  TAPSE:   23.9 mm       TRICUSPID VALVE/RVSP:          Normal Ranges:  Peak TR Velocity:     4.12 m/s  RV Syst Pressure:     71 mmHg  (< 30mmHg)       PULMONIC VALVE:          Normal Ranges:  PV Accel Time:  61 msec  (>120ms)  PV Max Noe:     1.3 m/s  (0.6-0.9m/s)  PV Max P.6 mmHg       [id number] Shelby DO  Electronically signed on 2025 at 11:14:06 AM       Wall Scoring       ** Final **  ECG 12 Lead  Sinus rhythm with Premature supraventricular complexes  Right bundle branch block  Possible Lateral infarct , age undetermined  Abnormal ECG  When compared with ECG of 11-MAY-2025 17:05, (unconfirmed)  Left anterior fascicular block is no longer Present  ST no longer depressed in Lateral leads  T wave amplitude has decreased in Lateral leads  See ED provider note for full interpretation and clinical correlation  Confirmed by Loren Pratt ([id number]) on 2025 10:26:25 AM  ECG 12 Lead  Sinus rhythm with Premature atrial complexes  Right bundle branch block  Left anterior fascicular block  ** Bifascicular block **  Abnormal ECG  When compared with ECG of 23-DEC-2024 14:45,  Premature ventricular complexes are no longer Present  Premature atrial complexes are now Present  Nonspecific T wave abnormality has replaced inverted T waves in Inferior leads  T wave inversion now evident in Anterior leads  See ED provider note for full interpretation and clinical correlation  Confirmed by Loren Pratt ([id number]) on 2025 10:26:15 AM          [1] cyanocobalamin, 250 mcg, oral, Daily  levothyroxine, 88 mcg, oral, Daily  oxygen, , inhalation, Continuous -  Inhalation  pantoprazole, 40 mg, oral, Daily   Or  pantoprazole, 40 mg, intravenous, Daily  sertraline, 50 mg, oral, Daily  simvastatin, 40 mg, oral, Nightly    [2] PRN medications: acetaminophen **OR** acetaminophen **OR** acetaminophen, heparin, ondansetron **OR** ondansetron  [3] heparin, 0-4,500 Units/hr, Last Rate: 1,600 Units/hr (05/12/25 0824)

## 2025-05-13 LAB
ALBUMIN SERPL BCP-MCNC: 2.8 G/DL (ref 3.4–5)
ALP SERPL-CCNC: 78 U/L (ref 33–136)
ALT SERPL W P-5'-P-CCNC: 7 U/L (ref 7–45)
ANION GAP SERPL CALC-SCNC: 11 MMOL/L (ref 10–20)
AST SERPL W P-5'-P-CCNC: 11 U/L (ref 9–39)
BASOPHILS # BLD AUTO: 0.06 X10*3/UL (ref 0–0.1)
BASOPHILS NFR BLD AUTO: 0.3 %
BILIRUB SERPL-MCNC: 0.3 MG/DL (ref 0–1.2)
BUN SERPL-MCNC: 13 MG/DL (ref 6–23)
CALCIUM SERPL-MCNC: 9.8 MG/DL (ref 8.6–10.3)
CHLORIDE SERPL-SCNC: 97 MMOL/L (ref 98–107)
CO2 SERPL-SCNC: 25 MMOL/L (ref 21–32)
CORTIS AM PEAK SERPL-MSCNC: 29.1 UG/DL (ref 5–20)
CREAT SERPL-MCNC: 0.57 MG/DL (ref 0.5–1.05)
EGFRCR SERPLBLD CKD-EPI 2021: >90 ML/MIN/1.73M*2
EOSINOPHIL # BLD AUTO: 0.14 X10*3/UL (ref 0–0.4)
EOSINOPHIL NFR BLD AUTO: 0.7 %
ERYTHROCYTE [DISTWIDTH] IN BLOOD BY AUTOMATED COUNT: 15.6 % (ref 11.5–14.5)
GLUCOSE SERPL-MCNC: 119 MG/DL (ref 74–99)
HCT VFR BLD AUTO: 29.5 % (ref 36–46)
HGB BLD-MCNC: 9.4 G/DL (ref 12–16)
IMM GRANULOCYTES # BLD AUTO: 0.27 X10*3/UL (ref 0–0.5)
IMM GRANULOCYTES NFR BLD AUTO: 1.3 % (ref 0–0.9)
LYMPHOCYTES # BLD AUTO: 0.77 X10*3/UL (ref 0.8–3)
LYMPHOCYTES NFR BLD AUTO: 3.8 %
MAGNESIUM SERPL-MCNC: 1.75 MG/DL (ref 1.6–2.4)
MCH RBC QN AUTO: 27 PG (ref 26–34)
MCHC RBC AUTO-ENTMCNC: 31.9 G/DL (ref 32–36)
MCV RBC AUTO: 85 FL (ref 80–100)
MONOCYTES # BLD AUTO: 1.28 X10*3/UL (ref 0.05–0.8)
MONOCYTES NFR BLD AUTO: 6.3 %
NEUTROPHILS # BLD AUTO: 17.75 X10*3/UL (ref 1.6–5.5)
NEUTROPHILS NFR BLD AUTO: 87.6 %
NRBC BLD-RTO: 0 /100 WBCS (ref 0–0)
OSMOLALITY SERPL: 269 MOSM/KG (ref 280–300)
OSMOLALITY UR: 645 MOSM/KG (ref 200–1200)
PLATELET # BLD AUTO: 428 X10*3/UL (ref 150–450)
POTASSIUM SERPL-SCNC: 3.9 MMOL/L (ref 3.5–5.3)
PROT SERPL-MCNC: 5.8 G/DL (ref 6.4–8.2)
RBC # BLD AUTO: 3.48 X10*6/UL (ref 4–5.2)
SODIUM SERPL-SCNC: 129 MMOL/L (ref 136–145)
UFH PPP CHRO-ACNC: 0.5 IU/ML (ref ?–1.1)
WBC # BLD AUTO: 20.3 X10*3/UL (ref 4.4–11.3)

## 2025-05-13 PROCEDURE — 1200000002 HC GENERAL ROOM WITH TELEMETRY DAILY

## 2025-05-13 PROCEDURE — 2500000001 HC RX 250 WO HCPCS SELF ADMINISTERED DRUGS (ALT 637 FOR MEDICARE OP): Performed by: INTERNAL MEDICINE

## 2025-05-13 PROCEDURE — 2500000001 HC RX 250 WO HCPCS SELF ADMINISTERED DRUGS (ALT 637 FOR MEDICARE OP): Performed by: NURSE PRACTITIONER

## 2025-05-13 PROCEDURE — 2500000001 HC RX 250 WO HCPCS SELF ADMINISTERED DRUGS (ALT 637 FOR MEDICARE OP): Performed by: STUDENT IN AN ORGANIZED HEALTH CARE EDUCATION/TRAINING PROGRAM

## 2025-05-13 PROCEDURE — 99232 SBSQ HOSP IP/OBS MODERATE 35: CPT | Performed by: STUDENT IN AN ORGANIZED HEALTH CARE EDUCATION/TRAINING PROGRAM

## 2025-05-13 PROCEDURE — 36415 COLL VENOUS BLD VENIPUNCTURE: CPT | Performed by: NURSE PRACTITIONER

## 2025-05-13 PROCEDURE — 85025 COMPLETE CBC W/AUTO DIFF WBC: CPT | Performed by: NURSE PRACTITIONER

## 2025-05-13 PROCEDURE — 82533 TOTAL CORTISOL: CPT | Mod: ELYLAB | Performed by: STUDENT IN AN ORGANIZED HEALTH CARE EDUCATION/TRAINING PROGRAM

## 2025-05-13 PROCEDURE — 2500000004 HC RX 250 GENERAL PHARMACY W/ HCPCS (ALT 636 FOR OP/ED): Mod: JZ | Performed by: STUDENT IN AN ORGANIZED HEALTH CARE EDUCATION/TRAINING PROGRAM

## 2025-05-13 PROCEDURE — 80053 COMPREHEN METABOLIC PANEL: CPT | Performed by: NURSE PRACTITIONER

## 2025-05-13 PROCEDURE — 97161 PT EVAL LOW COMPLEX 20 MIN: CPT | Mod: GP

## 2025-05-13 PROCEDURE — 36415 COLL VENOUS BLD VENIPUNCTURE: CPT | Performed by: STUDENT IN AN ORGANIZED HEALTH CARE EDUCATION/TRAINING PROGRAM

## 2025-05-13 PROCEDURE — 2500000002 HC RX 250 W HCPCS SELF ADMINISTERED DRUGS (ALT 637 FOR MEDICARE OP, ALT 636 FOR OP/ED): Performed by: STUDENT IN AN ORGANIZED HEALTH CARE EDUCATION/TRAINING PROGRAM

## 2025-05-13 PROCEDURE — 97165 OT EVAL LOW COMPLEX 30 MIN: CPT | Mod: GO

## 2025-05-13 PROCEDURE — 2500000005 HC RX 250 GENERAL PHARMACY W/O HCPCS: Performed by: EMERGENCY MEDICINE

## 2025-05-13 PROCEDURE — 85520 HEPARIN ASSAY: CPT | Performed by: STUDENT IN AN ORGANIZED HEALTH CARE EDUCATION/TRAINING PROGRAM

## 2025-05-13 PROCEDURE — 83735 ASSAY OF MAGNESIUM: CPT | Performed by: NURSE PRACTITIONER

## 2025-05-13 RX ORDER — BENZONATATE 100 MG/1
100 CAPSULE ORAL 3 TIMES DAILY PRN
Status: DISCONTINUED | OUTPATIENT
Start: 2025-05-13 | End: 2025-05-15 | Stop reason: HOSPADM

## 2025-05-13 RX ADMIN — PANTOPRAZOLE SODIUM 40 MG: 40 TABLET, DELAYED RELEASE ORAL at 06:02

## 2025-05-13 RX ADMIN — APIXABAN 10 MG: 5 TABLET, FILM COATED ORAL at 20:18

## 2025-05-13 RX ADMIN — LEVOTHYROXINE SODIUM 88 MCG: 0.09 TABLET ORAL at 06:01

## 2025-05-13 RX ADMIN — Medication 4 L/MIN: at 20:18

## 2025-05-13 RX ADMIN — TIOTROPIUM BROMIDE INHALATION SPRAY 2 PUFF: 3.12 SPRAY, METERED RESPIRATORY (INHALATION) at 06:02

## 2025-05-13 RX ADMIN — FLUTICASONE FUROATE AND VILANTEROL TRIFENATATE 1 PUFF: 100; 25 POWDER RESPIRATORY (INHALATION) at 06:02

## 2025-05-13 RX ADMIN — BENZONATATE 100 MG: 100 CAPSULE ORAL at 21:06

## 2025-05-13 RX ADMIN — HEPARIN SODIUM 1600 UNITS/HR: 10000 INJECTION, SOLUTION INTRAVENOUS at 00:18

## 2025-05-13 RX ADMIN — Medication 250 MCG: at 08:29

## 2025-05-13 RX ADMIN — BENZONATATE 100 MG: 100 CAPSULE ORAL at 01:37

## 2025-05-13 RX ADMIN — SERTRALINE HYDROCHLORIDE 50 MG: 50 TABLET, FILM COATED ORAL at 08:29

## 2025-05-13 RX ADMIN — HEPARIN SODIUM 1600 UNITS/HR: 10000 INJECTION, SOLUTION INTRAVENOUS at 13:50

## 2025-05-13 RX ADMIN — Medication 4 L/MIN: at 10:00

## 2025-05-13 RX ADMIN — SIMVASTATIN 40 MG: 40 TABLET, FILM COATED ORAL at 20:18

## 2025-05-13 ASSESSMENT — COGNITIVE AND FUNCTIONAL STATUS - GENERAL
CLIMB 3 TO 5 STEPS WITH RAILING: TOTAL
MOVING FROM LYING ON BACK TO SITTING ON SIDE OF FLAT BED WITH BEDRAILS: A LOT
DAILY ACTIVITIY SCORE: 12
DRESSING REGULAR LOWER BODY CLOTHING: A LOT
PERSONAL GROOMING: A LOT
HELP NEEDED FOR BATHING: A LOT
STANDING UP FROM CHAIR USING ARMS: A LOT
TURNING FROM BACK TO SIDE WHILE IN FLAT BAD: A LOT
MOVING FROM LYING ON BACK TO SITTING ON SIDE OF FLAT BED WITH BEDRAILS: A LOT
DRESSING REGULAR LOWER BODY CLOTHING: A LOT
TOILETING: A LOT
PERSONAL GROOMING: A LITTLE
TURNING FROM BACK TO SIDE WHILE IN FLAT BAD: A LOT
WALKING IN HOSPITAL ROOM: A LOT
CLIMB 3 TO 5 STEPS WITH RAILING: A LOT
STANDING UP FROM CHAIR USING ARMS: A LOT
DRESSING REGULAR UPPER BODY CLOTHING: A LOT
MOVING TO AND FROM BED TO CHAIR: TOTAL
WALKING IN HOSPITAL ROOM: A LITTLE
EATING MEALS: A LITTLE
MOVING TO AND FROM BED TO CHAIR: A LOT
TOILETING: A LOT
MOBILITY SCORE: 12
DRESSING REGULAR UPPER BODY CLOTHING: A LITTLE
HELP NEEDED FOR BATHING: A LOT
DAILY ACTIVITIY SCORE: 15
EATING MEALS: A LOT
MOBILITY SCORE: 11

## 2025-05-13 ASSESSMENT — PAIN SCALES - GENERAL
PAINLEVEL_OUTOF10: 0 - NO PAIN

## 2025-05-13 ASSESSMENT — PAIN - FUNCTIONAL ASSESSMENT
PAIN_FUNCTIONAL_ASSESSMENT: 0-10

## 2025-05-13 ASSESSMENT — ACTIVITIES OF DAILY LIVING (ADL): BATHING_ASSISTANCE: MODERATE

## 2025-05-13 NOTE — PROGRESS NOTES
SW was notified that pt family met with hospice and decided on plan for SNF placement for a short term before pursuing hospice care. SW met with daughter Danya today at pt bedside to choice for SNF. Danya informed SW that family has made arrangements for additional care at home and would like to pursue home with hospice WR instead of SNF. SW updated hospice as well as care team. Hospice informed SW that they have another meeting scheduled with family for tomorrow 5/14 between 1:30 and 2pm.

## 2025-05-13 NOTE — PROGRESS NOTES
Physical Therapy    Physical Therapy Evaluation    Patient Name: Brianne Mallory  MRN: [id number]  Today's Date: 5/13/2025   Time Calculation  Start Time: 0928  Stop Time: 0945  Time Calculation (min): 17 min  1103/1103-A    Assessment/Plan   PT Assessment  PT Assessment Results: Decreased strength, Decreased endurance, Impaired balance, Decreased mobility, Decreased cognition, Decreased coordination, Impaired judgement, Decreased safety awareness  Rehab Prognosis: Fair  Barriers to Discharge Home: Caregiver assistance, Cognition needs, Physical needs  Caregiver Assistance: Patient lives alone and/or does not have reliable caregiver assistance  Cognition Needs: 24hr supervision for safety awareness needed, Insight of patient limited regarding functional ability/needs, Cognition-related high falls risk  Physical Needs: 24hr mobility assistance needed, 24hr ADL assistance needed  Evaluation/Treatment Tolerance: Patient limited by fatigue  Strengths: Premorbid level of function, Support of extended family/friends  Barriers to Participation: Ability to acquire knowledge, Insight into problems, Comorbidities  End of Session Communication: Bedside nurse  Assessment Comment: pt with decreased mobility /gait strength balance endurance pt to benefit from skilled PT to address deficits and improve functional mobility .  End of Session Patient Position: Up in chair, Alarm on  IP OR SWING BED PT PLAN  Inpatient or Swing Bed: Inpatient  PT Plan  Treatment/Interventions: Bed mobility, Transfer training, Gait training, Stair training, Balance training, Strengthening, Endurance training, Therapeutic exercise, Therapeutic activity  PT Plan: Ongoing PT  PT Frequency: 3 times per week  PT Discharge Recommendations: Moderate intensity level of continued care  PT Recommended Transfer Status: Assist x1, Assistive device  PT - OK to Discharge: Yes (once medically ready for discharge to next level of care.)    Subjective     Current  "Problem:  1. Pulmonary embolism on left (Multi)  Transthoracic Echo Complete    Transthoracic Echo Complete      2. Hypoxia        3. Hyponatremia        4. Hypomagnesemia        5. Leukocytosis, unspecified type        6. Anemia, unspecified type        7. Recurrent malignant neoplasm of lung of unknown cell type, unspecified laterality (Multi)        8. Other acute pulmonary embolism without acute cor pulmonale  Transthoracic Echo Complete    Transthoracic Echo Complete      9. Acute hyponatremia  Transthoracic Echo Complete    Transthoracic Echo Complete      10. History of malignant neoplasm of thoracic cavity structure  Transthoracic Echo Complete    Transthoracic Echo Complete        General Visit Information:  General  Reason for Referral: weakness/ impaired mobility  Referred By: OT/PT Mitali 5/11  Past Medical History Relevant to Rehab: HLD,HTN,OA,depression,hypothyroidism,Anemia,LTKA, RTKA,DVT,lung CA,mets,R Uppper lobectomy,  Co-Treatment: OT  Co-Treatment Reason: to maximize pt safety  Prior to Session Communication: Bedside nurse (cleared to see for therapy eval)  Patient Position Received: Up in chair, Alarm on (pt has IV, 02  . cont 02/HR monitor.)  General Comment: pt is an 79 yo female came to the hospital on 5/11 with reports of SOB.   dx acute L PE . test/labs : Hep assay : 0.5, HGB 9.4,  NA + 127,  CXR : small left effusion,  CT head neg,  CT angio : new L lung PE,  new mass R lung.  CT Abd : neg,  had hospice meeting wanting to attempt some Skilled rehab .  Home Living:  Home Living  Home Living Comments: pt is a poor historian. reports she lives alone in a tri-level home. \" a few\" steps into home. unsure of rail.  bed and bath 1 leve up 5-6 steps unsure of rail.  pt has a tub/shower but reports only sponge bathing.  Prior Level of Function:  Prior Function Per Pt/Caregiver Report  Prior Function Comments: per pt mod I with SPC gait and transfers mod I adls. assistance with iadls.  no falls doesnt " drive .  Precautions:  Precautions  Medical Precautions: Oxygen therapy device and L/min, Fall precautions  Vital Signs:  Vital Signs  Heart Rate: 90  SpO2:  (93-96%)  Objective   Pain:  Pain Assessment  Pain Assessment: 0-10  0-10 (Numeric) Pain Score: 0 - No pain  Cognition:  Cognition  Overall Cognitive Status: Impaired  Orientation Level: Disoriented to time, Disoriented to place, Disoriented to situation  Memory:  (poor)  Insight: Moderate  Processing Speed: Delayed  General Assessments:  Activity Tolerance  Endurance: Decreased tolerance for upright activites  Sensation  Sensation Comment: intact bLEs  Strength  Strength Comments: BLEs 3+/5     Coordination  Movements are Fluid and Coordinated: Yes     Static Sitting Balance  Static Sitting-Comment/Number of Minutes: fair  Dynamic Sitting Balance  Dynamic Sitting-Comments: fair  Static Standing Balance  Static Standing-Comment/Number of Minutes: fair  Dynamic Standing Balance  Dynamic Standing-Comments: fair -  Functional Assessments:  Bed Mobility  Bed Mobility: No  Transfers  Transfer: Yes  Transfer 1  Technique 1: Sit to stand, Stand to sit  Transfer Device 1: Walker (FWW)  Transfer Level of Assistance 1: Moderate assistance, Moderate verbal cues  Trials/Comments 1: mod A to stand from chair with FWW with mod vcs.  cues to push up from chair  Ambulation/Gait Training  Ambulation/Gait Training Performed: Yes  Ambulation/Gait Training 1  Surface 1: Level tile  Device 1: Rolling walker  Assistance 1: Minimum assistance, Moderate verbal cues  Quality of Gait 1: Forward flexed posture, Decreased step length, Inconsistent stride length  Comments/Distance (ft) 1: 6 steps forward and back with min A with FWW cue fatigue needed to sit down. cues to reach back  with sitting .  Extremity/Trunk Assessments:  RUE   RUE : Within Functional Limits  LUE   LUE: Within Functional Limits  RLE   RLE : Within Functional Limits  LLE   LLE : Within Functional Limits  Outcome  Measures:  [facility] Basic Mobility  Turning from your back to your side while in a flat bed without using bedrails: A lot  Moving from lying on your back to sitting on the side of a flat bed without using bedrails: A lot  Moving to and from bed to chair (including a wheelchair): A lot  Standing up from a chair using your arms (e.g. wheelchair or bedside chair): A lot  To walk in hospital room: A little  Climbing 3-5 steps with railing: Total  Basic Mobility - Total Score: 12  Goals:  Encounter Problems       Encounter Problems (Active)       PT Problem       Pt will demonstrate SBA with bed mobility to edge of bed.   (Progressing)       Start:  05/13/25    Expected End:  05/27/25            Pt will demonstrate cga with sit to stand/chair transfers with FWW.   (Progressing)       Start:  05/13/25    Expected End:  05/27/25            Pt will ambulate 30 feet with FWW CGA .   (Progressing)       Start:  05/13/25    Expected End:  05/27/25            Pt to demo improved BLE strength by being able to complete supine/seated thera ex 2x20 BLEs with 4 or less rest breaks .   (Progressing)       Start:  05/13/25    Expected End:  05/27/25               Pain - Adult            Education Documentation  Mobility Training, taught by Tyler Menchaca, PT at 5/13/2025 10:53 AM.  Learner: Patient  Readiness: Acceptance  Method: Explanation  Response: Needs Reinforcement  Comment: transfers with FWW    Education Comments  No comments found.

## 2025-05-13 NOTE — CONSULTS
Reason For Consult  Evaluation of abnormal CT scan of the chest showing recurrent lung cancer, evaluation of cough, COPD, acute pulmonary embolism and severe pulmonary hypertension    HISTORY OF PRESENT ILLNESS: Chief Complaint: shortness of breath     History Of Present Illness:     Brianne Mallory is a 80 y.o. female with a significant past medical history of GERTRUDE NSCLC s/p VATs w GERTRUDE complete lobectomy [01/2021], HTN, HLD, hypothyroid, syncope and remote hx of DVT and PE presenting to [facility] with c/o shortness of breath, noted to be mildly hypoxic on arrival with SpO2 88% on RA and was started on 2L NC with good response. CTA chest showing Acute segmental and subsegmental PE of the left lung with straightening of interventricular septum suggestive of possible right heart strain. Please correlate with echo. Increased size right hilar and new upper mediastinal masses or nodes concerning for recurrent neoplasm. New nonspecific prominent left axillary lymph node. Attention on follow-up examination. Also noting severe LAD and mild right main disease.      Most pt history was obtained from the daughters at bedside, they state around East time daughters went to [place] on vacation and upon their return mother has had a sharp decline in her mental, functional, and nutritional status. Normally patient lives independently in the community alone, daughters visit daily, poor p.o. intake and decreased urine output over the last 48 hours change in mentation and increased confusion, and patient is now sleeping in her chair with frequent coughing. No evidence of acute infection. She is hard of hearing. Recent medication changes include increasing Zoloft from 25-50 daily. Daughters are aware of patient's lung cancer and have been concerned that it may be progressing. Patient had repeat chest CT ordered for tomorrow, will consult pulmonary for recommendations on prognosis given abnormal findings on today's CT.      I was asked  to evaluate and follow from a pulmonary perspective.  Extensive review of the chart 1 was done.  Also reviewed outpatient notes of Karlee Nuñez  a  thoracic oncologist and Gissell Pacheco at thoracic surgeon with  evaluations in the recent past.  In brief patient is a 80-year-old  female with a 30-pack-year plus prior smoking with known COPD Possibly moderate severity Trelegy Ellipta 100 mcg inhaler at home.  She had a left upper lobe ectomy by Dr. Fox on 1/21/2021 for non-small cell lung cancer left upper lobe pT1c N0 NSCLC.  At that time she presented with an acute PE initially treated with anticoagulation subsequently had a VATS converted to open VALERIE for left upper lobe wedge with completion lobectomy on 1/21/2021.  Separately patient was noted by surveillance CT to have a slowly growing right upper lobe lesion and had multiple biopsies which showed no malignant Pizzi.  However she did need right upper lobe resection on 10/23/2024 P T3 pN0 adenocarcinoma.  Patient was offered chemotherapy by Dr. Karlee Nuñez, but the patient refused.  The patient has refused further treatment of lung cancer and recently there has been a decline in her mental status with suspicion of dementia.  Patient was to see Dr. Pacheco after a repeat CT of the chest.  In the interim, the patient presents with acute shortness of breath and cough.  CT of the chest is noted to have acute left upper lobe pulmonary embolism as well as increase in right upper lobe mass with increasing right hilar adenopathy all suggestive of recurrent lung cancer.  Patients is normally not on home O2 currently requiring couple of liters of oxygen.     VSS, started on Heparin infusion, and admitted under medicine service for further evaluation/recommendations.     Review of systems: 10 point review of systems is otherwise negative except as mentioned above.     PAST HISTORIES:     Past Medical History: Medical Problems      Problem List      *  (Principal) Other pulmonary embolism without acute cor pulmonale             Overview Signed 5/11/2025 8:00 PM by MAGUE Crooks Heparin infusion started;p prior hx of PE and DVT w/in the past last year      Weakness    Vitamin D deficiency    Syncope and collapse    Status post total left knee replacement    Osteoarthritis of left knee    Hypothyroid    Depression    Edema of lower extremity    Essential hypertension    History of malignant neoplasm of thoracic cavity structure    History of pulmonary embolism    HLD (hyperlipidemia)    Bradycardia    B12 deficiency    Anemia due to blood loss    Memory changes    Deep vein thrombosis (DVT) of right lower extremity    Malignant neoplasm of upper lobe of left lung (Multi)    Lung nodule    Acute hyponatremia             Overview Signed 5/11/2025 8:00 PM by MAGUE Crooks New hyponatremia ISO acute PE and new lung nodules     Pulmonary embolism on left (Multi)    Mass of upper lobe of right lung         Past Surgical History: [Surgical History]      [Surgical History] Past Surgical History Procedure Laterality Date  BRONCHOSCOPY      CATARACT EXTRACTION      JOINT REPLACEMENT      KNEE ARTHROPLASTY      right an dleft  OTHER SURGICAL HISTORY 12/28/2020 Biopsy  OTHER SURGICAL HISTORY Left 01/21/2021 GERTRUDE Lung lobectomy     Social History: She reports that she has quit smoking. Her smoking use included cigarettes. She started smoking about 6 years ago. She has a 6 pack-year smoking history. She has been exposed to tobacco smoke. She has never used smokeless tobacco. She reports that she does not currently use alcohol. She reports that she does not use drugs.     Family History: [Family History]     [Family History] Problem Relation Name Age of Onset  Hypertension Mother      Heart attack Father      Lung cancer Maternal Grandmother      Diabetes Paternal Grandmother      Allergies: Aspirin, Ibuprofen, Nickel, Sulfa (sulfonamide antibiotics), and  Penicillins     OBJECTIVE:     Last Recorded Vitals: Vitals Vitals: 05/11/25 1739 05/11/25 1800 05/11/25 1858 05/11/25 2000 BP: 123/58 109/64     BP Location: Right arm     Patient Position: Lying     Pulse: 84 86 88 94 Resp: 16 20 18     Temp: 36.6 °C (97.9 °F)     SpO2: 94% (!) 92% 94% 95% Weight:     Height:      Last I/O: No intake/output data recorded.     Physical Exam Vitals and nursing note reviewed. Constitutional:      Appearance: She is normal weight. She is ill-appearing. HENT: Head: Normocephalic and atraumatic. Nose: Nose normal. Mouth/Throat: Mouth: Mucous membranes are dry. Pharynx: Oropharynx is clear. Eyes: Extraocular Movements: Extraocular movements intact. Conjunctiva/sclera: Conjunctivae normal. Pupils: Pupils are equal, round, and reactive to light. Neck: Comments: No JVD Cardiovascular: Rate and Rhythm: Normal rate and regular rhythm. Pulses: Normal pulses. Heart sounds: Normal heart sounds. Pulmonary: Effort: Pulmonary effort is normal. Breath sounds: Normal breath sounds. Comments: 2L NC Abdominal: General: Abdomen is flat. Bowel sounds are normal. Palpations: Abdomen is soft. Genitourinary: Comments: Not assessed Musculoskeletal:      General: Normal range of motion. Cervical back: Normal range of motion. Comments: No edema Skin: General: Skin is warm and dry. Capillary Refill: Capillary refill takes less than 2 seconds. Neurological: General: No focal deficit present. Mental Status: She is alert. Mental status is at baseline. She is disoriented. Motor: Weakness present. Comments: General confusion, A+O x2, pleasant and cooperative Psychiatric:      Mood and Affect: Mood normal.      Behavior: Behavior normal.      Thought Content: Thought content normal.      Judgment: Judgment normal.      Scheduled Medications [Scheduled Medications]     [Scheduled Medications] heparin, 80 Units/kg, intravenous, Once oxygen, , inhalation, Continuous - Inhalation sodium chloride, 1,000 mL, intravenous,  Once     PRN Medications [PRN Medications]     [PRN Medications] PRN medications: heparin Continuous Medications [Continuous Medications]     [Continuous Medications] heparin, 0-4,500 Units/hr     Outpatient Medications: Prior to Admission medications Medication Sig Start Date End Date Taking? Authorizing Provider acetaminophen (Tylenol) 325 mg tablet Take 2 tablets (650 mg) by mouth every 4 hours. 10/25/24 MAGUE Sweeney calcium citrate-vitamin D2 250 mg-2.5 mcg (100 unit) tablet Take 1 tablet by mouth once daily. Historical Provider, MD cyanocobalamin (Vitamin B-12) 250 mcg tablet Take 1 tablet (250 mcg) by mouth once daily. Historical Provider, MD levothyroxine (Synthroid, Levoxyl) 88 mcg tablet Take 1 tablet (88 mcg) by mouth early in the morning.. 12/6/20 Historical Provider, MD lidocaine 4 % patch Place 2 patches over 12 hours on the skin once daily. Remove & discard patch within 12 hours or as directed by MD. Patient not taking: Reported on 12/2/2024 10/26/24 MAGUE Sweeney pantoprazole (ProtoNix) 40 mg EC tablet Take 1 tablet (40 mg) by mouth once daily in the morning. Take before meals. Do not crush, chew, or split. Patient not taking: Reported on 10/22/2024 8/21/24 9/20/24 Herb Kramer MD, MS sertraline (Zoloft) 25 mg tablet Take 1 tablet (25 mg) by mouth once daily. 1/15/24 Historical Provider, MD simvastatin (Zocor) 40 mg tablet Take 1 tablet (40 mg) by mouth once daily at bedtime. 12/6/20 Historical Provider, MD traMADol (Ultram) 50 mg tablet Take 1 tablet (50 mg) by mouth every 6 hours if needed (pain). Patient not taking: Reported on 12/2/2024 10/25/24 MAGUE Sweeney     LABS AND IMAGING:     Labs: Results for orders placed or performed during the hospital encounter of 05/11/25 (from the past 24 hours) CBC and Auto Differential Result Value Ref Range WBC 23.6 (H) 4.4 - 11.3 x103/uL nRBC 0.0 0.0 - 0.0 /100 WBCs RBC 4.04 4.00 - 5.20 x106/uL Hemoglobin 11.2 (L) 12.0 -  16.0 g/dL Hematocrit 34.6 (L) 36.0 - 46.0 % MCV 86 80 - 100 fL MCH 27.7 26.0 - 34.0 pg MCHC 32.4 32.0 - 36.0 g/dL RDW 15.4 (H) 11.5 - 14.5 % Platelets 431 150 - 450 x103/uL Neutrophils % 87.8 40.0 - 80.0 % Immature Granulocytes %, Automated 1.7 (H) 0.0 - 0.9 % Lymphocytes % 3.7 13.0 - 44.0 % Monocytes % 5.8 2.0 - 10.0 % Eosinophils % 0.7 0.0 - 6.0 % Basophils % 0.3 0.0 - 2.0 % Neutrophils Absolute 20.71 (H) 1.60 - 5.50 x103/uL Immature Granulocytes Absolute, Automated 0.40 0.00 - 0.50 x103/uL Lymphocytes Absolute 0.88 0.80 - 3.00 x103/uL Monocytes Absolute 1.37 (H) 0.05 - 0.80 x103/uL Eosinophils Absolute 0.17 0.00 - 0.40 x103/uL Basophils Absolute 0.06 0.00 - 0.10 x103/uL Comprehensive Metabolic Panel Result Value Ref Range Glucose 110 (H) 74 - 99 mg/dL Sodium 127 (L) 136 - 145 mmol/L Potassium 4.0 3.5 - 5.3 mmol/L Chloride 94 (L) 98 - 107 mmol/L Bicarbonate 23 21 - 32 mmol/L Anion Gap 14 10 - 20 mmol/L Urea Nitrogen 18 6 - 23 mg/dL Creatinine 0.71 0.50 - 1.05 mg/dL eGFR 85 >60 mL/min/1.73m2 Calcium 10.9 (H) 8.6 - 10.3 mg/dL Albumin 3.4 3.4 - 5.0 g/dL Alkaline Phosphatase 90 33 - 136 U/L Total Protein 7.0 6.4 - 8.2 g/dL AST 19 9 - 39 U/L Bilirubin, Total 0.4 0.0 - 1.2 mg/dL ALT 11 7 - 45 U/L Lipase Result Value Ref Range Lipase 22 9 - 82 U/L Magnesium Result Value Ref Range Magnesium 1.36 (L) 1.60 - 2.40 mg/dL B-Type Natriuretic Peptide Result Value Ref Range BNP 83 0 - 99 pg/mL Protime-INR Result Value Ref Range Protime 14.4 (H) 9.8 - 12.4 seconds INR 1.3 (H) 0.9 - 1.1 Lactate Result Value Ref Range Lactate 1.5 0.4 - 2.0 mmol/L Troponin I, High Sensitivity, Initial Result Value Ref Range Troponin I, High Sensitivity 7 0 - 13 ng/L ECG 12 Lead Result Value Ref Range Ventricular Rate 94 BPM Atrial Rate 94 BPM OR Interval 148 ms QRS Duration 124 ms QT Interval 378 ms QTC Calculation(Bazett) 472 ms P Axis 70 degrees R Axis -51 degrees T Axis 57 degrees QRS Count 15 beats Q Onset 214 ms P Onset 140 ms P Offset 196 ms T  Offset 403 ms QTC Fredericia 439 ms Sars-CoV-2 PCR Result Value Ref Range Coronavirus 2019, PCR Not Detected Not Detected Influenza A, and B PCR Result Value Ref Range Flu A Result Not Detected Not Detected Flu B Result Not Detected Not Detected RSV PCR Result Value Ref Range RSV PCR Not Detected Not Detected Blood Gas Arterial Result Value Ref Range POCT pH, Arterial 7.45 (H) 7.38 - 7.42 pH POCT pCO2, Arterial 33 (L) 38 - 42 mm Hg POCT pO2, Arterial 63 (L) 85 - 95 mm Hg POCT SO2, Arterial 93 (L) 94 - 100 % POCT Oxy Hemoglobin, Arterial 90.6 (L) 94.0 - 98.0 % POCT Base Excess, Arterial -0.4 -2.0 - 3.0 mmol/L POCT HCO3 Calculated, Arterial 22.9 22.0 - 26.0 mmol/L Patient Temperature      FiO2 28 % Site of Arterial Puncture Radial Right      Ramón's Test Positive      Site of Arterial Puncture RRAD      Ramón's Test YES     Troponin, High Sensitivity, 1 Hour Result Value Ref Range Troponin I, High Sensitivity 6 0 - 13 ng/L ECG 12 Lead Result Value Ref Range Ventricular Rate 89 BPM Atrial Rate 89 BPM GA Interval 160 ms QRS Duration 134 ms QT Interval 402 ms QTC Calculation(Bazett) 489 ms P Axis 2 degrees R Axis 109 degrees T Axis 51 degrees QRS Count 14 beats Q Onset 217 ms P Onset 137 ms P Offset 198 ms T Offset 418 ms QTC Fredericia 458 ms Urinalysis with Reflex Culture and Microscopic Result Value Ref Range Color, Urine Yellow Light-Yellow, Yellow, Dark-Yellow Appearance, Urine Clear Clear Specific Gravity, Urine 1.032 1.005 - 1.035 pH, Urine 5.5 5.0, 5.5, 6.0, 6.5, 7.0, 7.5, 8.0 Protein, Urine NEGATIVE NEGATIVE, 10 (TRACE), 20 (TRACE) mg/dL Glucose, Urine Normal Normal mg/dL Blood, Urine NEGATIVE NEGATIVE mg/dL Ketones, Urine NEGATIVE NEGATIVE mg/dL Bilirubin, Urine NEGATIVE NEGATIVE mg/dL Urobilinogen, Urine Normal Normal mg/dL Nitrite, Urine NEGATIVE NEGATIVE Leukocyte Esterase, Urine NEGATIVE NEGATIVE     Imaging: CT angio chest for pulmonary embolism Final Result Acute segmental and subsegmental PE of the left  lung with straightening of interventricular septum suggestive of possible right heart strain. Please correlate with echocardiography.     Increased size right hilar and new upper mediastinal masses or nodes concerning for recurrent neoplasm. New nonspecific prominent left axillary lymph node. Attention on follow-up examination.     No acute abdomen and pelvic process.     MACRO Aquiles Fonseca discussed the significance and urgency of this critical finding by Epic secure chat with JAQUAN ROMAN on 5/11/2025 at 7:44 pm. (-RCF-) Findings: See findings.     Signed by: Aquiles Fonseca 5/11/2025 7:46 PM Dictation workstation: [id number]     CT abdomen pelvis w IV contrast Final Result Acute segmental and subsegmental PE of the left lung with straightening of interventricular septum suggestive of possible right heart strain. Please correlate with echocardiography.     Increased size right hilar and new upper mediastinal masses or nodes concerning for recurrent neoplasm. New nonspecific prominent left axillary lymph node. Attention on follow-up examination.     No acute abdomen and pelvic process.     MACRO Aquiles Fonseca discussed the significance and urgency of this critical finding by Epic secure chat with JAQUAN ROMAN on 5/11/2025 at 7:44 pm. (-RCF-) Findings: See findings.     Signed by: Aquiles Fonseca 5/11/2025 7:46 PM Dictation workstation: [id number]     CT head wo IV contrast Final Result No CT evidence of acute intracranial abnormality.     Chronic microvascular ischemic change and parenchymal atrophy.     MACRO None     Signed by: Aquiles Fonseca 5/11/2025 7:18 PM Dictation workstation: [id number]     XR chest 1 view Final Result Small left effusion suspected.          ASSESSMENT & PLAN:     Acute Left - Pulmonary Emboli Acute respiratory failure with hypoxia Abnormal CT - progressive metastatic disease ? Hx: NSCLC s/p GERTRUDE VATs and total RUL lobectomy [01/2021]     Dyspnea multifactorial due to acute PE  underlying COPD and severe pulmonary hypertension noted on echocardiogram     COPD of at least moderate severity     Acute pulmonary hypertension likely on the basis of a large volume PE     DNAR/DNI/guarded prognosis     Consult pulmonary for abnormal CT findings - metastatic disease progressing ? - Thoracic surgery consult ? versus Hospice ?     Heparin infusion     Complete Echo [TTE ordered]      Trop negative [7_6], EKG reviewed     UA negative     Monitor on telemetry overnight     Daily EKG     NEW Hyponatremia [127] Hypomagnesemia [1.36] Leukocytosis [23.6] Suspect d/t abnormal CT findings - possible progression of NSCLC ?     Consult TCC - will need to update code status and POA/living will paperwork; may need hospice services - pending prognosis     Consult PT/OT     consult dietitian      Baseline mentation is A+O x3, now A+O x2 - family reports increased confusion and functional decline over the last 2 weeks, patient lives independently in the community.     Trend labs in a.m.      BC x2 follow results     Replace/recheck Mag      bladder scan if no output every shift     POA daughter      Danya Garcia [phone]      Available anytime after 12p to speak with TCC, Attending, and pulmonary     CODE STATUS is DNR/DNI confirmed at bedside with patient and family - they may consider DNR comfort care if deemed to have a poor prognosis     VTE Prophylaxis: cont heparin infusion     Pulmonary comments:-I had a long discussion with Danya Garcia, patient's POA and one of her sisters at the bedside.  Family understands overall poor prognosis, patient not wanting further treatment of recurrent lung cancer and overall poor prognosis.  Patient is likely in the hospice range and would support hospice placement for this patient.  Family has already met with hospice and agreed to go to hospice once all the arrangements are made.  Agree with current treatment of acute PE with heparin with plan to transition  to Eliquis per per protocol tomorrow.  Patient's pulmonary hypertension is on the basis of acute large-volume PE and treatment of pulmonary embolism should suffice.  For COPD, since we do not have Trelegy Ellipta 100 mcg inhaler here, I will substitute Breo Ellipta 100 mcg inhaler daily with Spiriva Respimat 2.5 mcg inhaler daily.  Patient will need home O2 evaluation on discharge unless she is going directly to hospice.  A/T/S pamphlets on healthy sleep, sleep apnea, insomnia, COPD, CODE STATUS and palliative care were given.  I shall continue to monitor this patient with you, and I thank you for the referral.     I spent 55 minutes in the professional and overall care of this patient.      Rodger Silva MD

## 2025-05-13 NOTE — PROGRESS NOTES
Occupational Therapy    Evaluation    Patient Name: Brianne Mallory  MRN: [id number]  Department: [facility]  Room: [facility]1103-A  Today's Date: 5/13/2025  Time Calculation  Start Time: 0933  Stop Time: 0944  Time Calculation (min): 11 min      Assessment:  OT Assessment: Patient is limited by balance deficits, confusion, deconditioning/weakness and decreased insight/safety.  Prognosis: Fair  Barriers to Discharge Home: Caregiver assistance, Cognition needs, Physical needs  Evaluation/Treatment Tolerance: Patient tolerated treatment well  End of Session Communication: Bedside nurse  End of Session Patient Position: Up in chair, Alarm on (Call light within reach.)  OT Assessment Results: Decreased ADL status, Decreased cognition, Decreased endurance, Decreased functional mobility  Prognosis: Fair  Evaluation/Treatment Tolerance: Patient tolerated treatment well  Plan:  Treatment Interventions: ADL retraining, Functional transfer training, UE strengthening/ROM, Endurance training  OT Frequency: 2 times per week  OT Discharge Recommendations: Moderate intensity level of continued care  OT Recommended Transfer Status: Assist of 1  OT - OK to Discharge: Yes (Once medically appropriate.)    Subjective     General:  General  Reason for Referral: ADLs  Referred By: CHICHI Jasmine CNP  Past Medical History Relevant to Rehab: HLD, HTN, DVT, Hypothyroidism, Anemia, Lobectomy, Syncope, L TKA, OA, Tobacco use, Lung CA  Co-Treatment: PT  Co-Treatment Reason: To maximize functional outcomes and patient safety.  Prior to Session Communication: Bedside nurse (Cleared for therapy evaluation by RN.)  Patient Position Received: Up in chair, Alarm on  General Comment: Patient presented with c/o SOB. Per family, patient with increased confusion, decreased appetite and urinary output x48 hrs. + PE, started on Heparin. + acute respiratory failure with hypoxia. Dx: PE. CXR: small L effusion. CT head: (-) acute. CT angio chest: acute segmental and  "subsegmental PE L lung, increased size in  R hilar and new upper mediastinal masses or nodes. Pulmonology and Hospice consulted.  Precautions:  Medical Precautions: Fall precautions    Vital Signs Comment: 92-95% NC during evaluation.     Pain:  Pain Assessment  Pain Assessment: 0-10  0-10 (Numeric) Pain Score: 0 - No pain    Objective   Cognition:  Overall Cognitive Status:  (Pleasantly confused.)  Orientation Level:  (Oriented to self only. Stated place as \"recovery place\" despite being provided with 2 choices, unable to recall the month with choices provided.)  Processing Speed: Delayed           Home Living:  Home Living Comments: Patient is a questionable historian. Lives alone in a tri level house, unable to recall the # of steps to enter. Reports bedroom and bathroom on the 2nd level, ~5-6 steps (unsure if there is a rail available). Tub shower (sponge bathes).  Prior Function:  Prior Function Comments: Patient is a questionable historian. Reports ambulating at mod I level with a cane, owns a FWW. Independent with ADLs, family assists with IADLs. Denies falls in the past 3 months. Dooes not drive.     ADL:  Eating Assistance:  (Setup)  Grooming Assistance: Minimal (Min A for standing balance.)  Bathing Assistance: Moderate  UE Dressing Assistance:  (Setup/S)  LE Dressing Assistance: Maximal  Toileting Assistance with Device: Maximal  Activity Tolerance:  Endurance: Decreased tolerance for upright activites  Bed Mobility/Transfers:      Transfers  Transfer: Yes  Transfer 1  Technique 1: Stand to sit, Sit to stand  Transfer Device 1:  (FWW)  Trials/Comments 1: Patient completed sit <> stand from the recliner with a FWW at mod A level. Cues for safe hand placement. Increased time/effort to transition from sitting to standing.    Functional Mobility:  Functional Mobility  Functional Mobility Performed: Yes  Functional Mobility 1  Comments 1: Patient completed short distance functional mobility with a FWW at min A " level, cues for safe hand placement.     Standing Balance:  Dynamic Standing Balance  Dynamic Standing-Comments: Poor +/fair -     Sensation:  Sensation Comment: Denies paresthesia in UEs.  Strength:  Strength Comments: B/L UE MMT 3- to 3/5 proximally and 3+/5 distally.     Extremities: RUE   RUE : Within Functional Limits (R UE AROM shoulder flex ~90 degrees.) and LUE   LUE:  (L UE AROM shoulder flex ~70 degrees, AROM elbow flex/ext and digit flex/ext WFL.)    Outcome Measures:[facility] Daily Activity  Putting on and taking off regular lower body clothing: A lot  Bathing (including washing, rinsing, drying): A lot  Putting on and taking off regular upper body clothing: A little  Toileting, which includes using toilet, bedpan or urinal: A lot  Taking care of personal grooming such as brushing teeth: A little  Eating Meals: A little  Daily Activity - Total Score: 15    Education Documentation  Body Mechanics, taught by Erika Jain OT at 5/13/2025 11:01 AM.  Learner: Patient  Readiness: Acceptance  Method: Explanation  Response: Needs Reinforcement  Comment: Walker management.    EDUCATION:  Education  Individual(s) Educated: Patient  Education Provided: POC discussed and agreed upon, Risk and benefits of OT discussed with patient or other, Fall precautons  Patient Response to Education:  (Recommend continued education for carryover.)    Goals:  Encounter Problems       Encounter Problems (Active)       OT Goals       Patient will complete functional mobility with a FWW at CGA level.  (Progressing)       Start:  05/13/25    Expected End:  05/27/25            Patient will complete functional transfers with a FWW at SBA level.  (Progressing)       Start:  05/13/25    Expected End:  05/27/25            Patient will complete lower body dressing at SBA level.  (Progressing)       Start:  05/13/25    Expected End:  05/27/25            Patient will demonstrate fair/fair + dynamic standing balance during functional tasks.  (Progressing)       Start:  05/13/25    Expected End:  05/27/25            Patient will tolerate standing greater than 5 minutes during ADLs.  (Progressing)       Start:  05/13/25    Expected End:  05/27/25

## 2025-05-13 NOTE — CARE PLAN
Problem: Fall/Injury  Goal: Not fall by end of shift  Outcome: Progressing  Goal: Be free from injury by end of the shift  Outcome: Progressing  Goal: Verbalize understanding of personal risk factors for fall in the hospital  Outcome: Progressing  Goal: Verbalize understanding of risk factor reduction measures to prevent injury from fall in the home  Outcome: Progressing  Goal: Use assistive devices by end of the shift  Outcome: Progressing  Goal: Pace activities to prevent fatigue by end of the shift  Outcome: Progressing   The patient's goals for the shift include      The clinical goals for the shift include safety/ o2 sats within normal range    Over the shift, the patient did make progress toward the following goals.

## 2025-05-13 NOTE — CARE PLAN
Problem: Fall/Injury  Goal: Not fall by end of shift  Outcome: Met  Goal: Be free from injury by end of the shift  Outcome: Met  Goal: Verbalize understanding of personal risk factors for fall in the hospital  Outcome: Met  Goal: Verbalize understanding of risk factor reduction measures to prevent injury from fall in the home  Outcome: Met  Goal: Use assistive devices by end of the shift  Outcome: Met  Goal: Pace activities to prevent fatigue by end of the shift  Outcome: Met     Problem: Pain - Adult  Goal: Verbalizes/displays adequate comfort level or baseline comfort level  Outcome: Met     Problem: Safety - Adult  Goal: Free from fall injury  Outcome: Met     Problem: Discharge Planning  Goal: Discharge to home or other facility with appropriate resources  Outcome: Met     Problem: Chronic Conditions and Co-morbidities  Goal: Patient's chronic conditions and co-morbidity symptoms are monitored and maintained or improved  Outcome: Met   The patient's goals for the shift include      The clinical goals for the shift include patient will remain free from injury throughout shift    Over the shift, the patient did not make progress toward the following goals. Barriers to progression include acute illness. Recommendations to address these barriers include continued education and reinforcement of information.

## 2025-05-14 LAB
ALBUMIN SERPL BCP-MCNC: 2.8 G/DL (ref 3.4–5)
ALP SERPL-CCNC: 77 U/L (ref 33–136)
ALT SERPL W P-5'-P-CCNC: 7 U/L (ref 7–45)
ANION GAP SERPL CALC-SCNC: 10 MMOL/L (ref 10–20)
AST SERPL W P-5'-P-CCNC: 10 U/L (ref 9–39)
BASOPHILS # BLD AUTO: 0.05 X10*3/UL (ref 0–0.1)
BASOPHILS NFR BLD AUTO: 0.2 %
BILIRUB SERPL-MCNC: 0.4 MG/DL (ref 0–1.2)
BUN SERPL-MCNC: 12 MG/DL (ref 6–23)
CALCIUM SERPL-MCNC: 10.3 MG/DL (ref 8.6–10.3)
CHLORIDE SERPL-SCNC: 97 MMOL/L (ref 98–107)
CO2 SERPL-SCNC: 26 MMOL/L (ref 21–32)
CREAT SERPL-MCNC: 0.61 MG/DL (ref 0.5–1.05)
EGFRCR SERPLBLD CKD-EPI 2021: 89 ML/MIN/1.73M*2
EOSINOPHIL # BLD AUTO: 0.13 X10*3/UL (ref 0–0.4)
EOSINOPHIL NFR BLD AUTO: 0.6 %
ERYTHROCYTE [DISTWIDTH] IN BLOOD BY AUTOMATED COUNT: 15.7 % (ref 11.5–14.5)
GLUCOSE SERPL-MCNC: 105 MG/DL (ref 74–99)
HCT VFR BLD AUTO: 29.9 % (ref 36–46)
HGB BLD-MCNC: 9.6 G/DL (ref 12–16)
HOLD SPECIMEN: NORMAL
IMM GRANULOCYTES # BLD AUTO: 0.22 X10*3/UL (ref 0–0.5)
IMM GRANULOCYTES NFR BLD AUTO: 1 % (ref 0–0.9)
LYMPHOCYTES # BLD AUTO: 0.73 X10*3/UL (ref 0.8–3)
LYMPHOCYTES NFR BLD AUTO: 3.5 %
MAGNESIUM SERPL-MCNC: 1.59 MG/DL (ref 1.6–2.4)
MCH RBC QN AUTO: 27.4 PG (ref 26–34)
MCHC RBC AUTO-ENTMCNC: 32.1 G/DL (ref 32–36)
MCV RBC AUTO: 85 FL (ref 80–100)
MONOCYTES # BLD AUTO: 1.48 X10*3/UL (ref 0.05–0.8)
MONOCYTES NFR BLD AUTO: 7 %
NEUTROPHILS # BLD AUTO: 18.51 X10*3/UL (ref 1.6–5.5)
NEUTROPHILS NFR BLD AUTO: 87.7 %
NRBC BLD-RTO: 0 /100 WBCS (ref 0–0)
PLATELET # BLD AUTO: 428 X10*3/UL (ref 150–450)
POTASSIUM SERPL-SCNC: 3.9 MMOL/L (ref 3.5–5.3)
PROT SERPL-MCNC: 5.8 G/DL (ref 6.4–8.2)
RBC # BLD AUTO: 3.5 X10*6/UL (ref 4–5.2)
SODIUM SERPL-SCNC: 129 MMOL/L (ref 136–145)
UFH PPP CHRO-ACNC: >2 IU/ML (ref ?–1.1)
WBC # BLD AUTO: 21.1 X10*3/UL (ref 4.4–11.3)

## 2025-05-14 PROCEDURE — 1210000001 HC SEMI-PRIVATE ROOM DAILY

## 2025-05-14 PROCEDURE — 36415 COLL VENOUS BLD VENIPUNCTURE: CPT | Performed by: NURSE PRACTITIONER

## 2025-05-14 PROCEDURE — 2500000001 HC RX 250 WO HCPCS SELF ADMINISTERED DRUGS (ALT 637 FOR MEDICARE OP): Performed by: STUDENT IN AN ORGANIZED HEALTH CARE EDUCATION/TRAINING PROGRAM

## 2025-05-14 PROCEDURE — 99232 SBSQ HOSP IP/OBS MODERATE 35: CPT | Performed by: STUDENT IN AN ORGANIZED HEALTH CARE EDUCATION/TRAINING PROGRAM

## 2025-05-14 PROCEDURE — 83735 ASSAY OF MAGNESIUM: CPT | Performed by: NURSE PRACTITIONER

## 2025-05-14 PROCEDURE — 84075 ASSAY ALKALINE PHOSPHATASE: CPT | Performed by: NURSE PRACTITIONER

## 2025-05-14 PROCEDURE — 2500000001 HC RX 250 WO HCPCS SELF ADMINISTERED DRUGS (ALT 637 FOR MEDICARE OP): Performed by: INTERNAL MEDICINE

## 2025-05-14 PROCEDURE — 85025 COMPLETE CBC W/AUTO DIFF WBC: CPT | Performed by: NURSE PRACTITIONER

## 2025-05-14 PROCEDURE — 2500000005 HC RX 250 GENERAL PHARMACY W/O HCPCS: Performed by: EMERGENCY MEDICINE

## 2025-05-14 PROCEDURE — 85520 HEPARIN ASSAY: CPT | Performed by: STUDENT IN AN ORGANIZED HEALTH CARE EDUCATION/TRAINING PROGRAM

## 2025-05-14 PROCEDURE — 2500000002 HC RX 250 W HCPCS SELF ADMINISTERED DRUGS (ALT 637 FOR MEDICARE OP, ALT 636 FOR OP/ED): Performed by: STUDENT IN AN ORGANIZED HEALTH CARE EDUCATION/TRAINING PROGRAM

## 2025-05-14 PROCEDURE — 2500000001 HC RX 250 WO HCPCS SELF ADMINISTERED DRUGS (ALT 637 FOR MEDICARE OP): Performed by: NURSE PRACTITIONER

## 2025-05-14 RX ADMIN — TIOTROPIUM BROMIDE INHALATION SPRAY 2 PUFF: 3.12 SPRAY, METERED RESPIRATORY (INHALATION) at 06:03

## 2025-05-14 RX ADMIN — APIXABAN 10 MG: 5 TABLET, FILM COATED ORAL at 09:49

## 2025-05-14 RX ADMIN — ACETAMINOPHEN 650 MG: 325 TABLET ORAL at 14:59

## 2025-05-14 RX ADMIN — PANTOPRAZOLE SODIUM 40 MG: 40 TABLET, DELAYED RELEASE ORAL at 05:26

## 2025-05-14 RX ADMIN — Medication 4 L/MIN: at 08:52

## 2025-05-14 RX ADMIN — LEVOTHYROXINE SODIUM 88 MCG: 0.09 TABLET ORAL at 05:26

## 2025-05-14 RX ADMIN — FLUTICASONE FUROATE AND VILANTEROL TRIFENATATE 1 PUFF: 100; 25 POWDER RESPIRATORY (INHALATION) at 06:04

## 2025-05-14 RX ADMIN — SERTRALINE HYDROCHLORIDE 50 MG: 50 TABLET, FILM COATED ORAL at 09:49

## 2025-05-14 RX ADMIN — GUAIFENESIN AND DEXTROMETHORPHAN HYDROBROMIDE 1 TABLET: 600; 30 TABLET, EXTENDED RELEASE ORAL at 03:23

## 2025-05-14 RX ADMIN — Medication 4 L/MIN: at 20:50

## 2025-05-14 RX ADMIN — Medication 250 MCG: at 09:49

## 2025-05-14 RX ADMIN — APIXABAN 10 MG: 5 TABLET, FILM COATED ORAL at 20:44

## 2025-05-14 RX ADMIN — SIMVASTATIN 40 MG: 40 TABLET, FILM COATED ORAL at 20:44

## 2025-05-14 ASSESSMENT — COGNITIVE AND FUNCTIONAL STATUS - GENERAL
TURNING FROM BACK TO SIDE WHILE IN FLAT BAD: A LOT
MOBILITY SCORE: 12
EATING MEALS: A LITTLE
CLIMB 3 TO 5 STEPS WITH RAILING: A LOT
DRESSING REGULAR UPPER BODY CLOTHING: A LOT
WALKING IN HOSPITAL ROOM: A LOT
DAILY ACTIVITIY SCORE: 14
PERSONAL GROOMING: A LITTLE
DAILY ACTIVITIY SCORE: 14
TOILETING: A LOT
MOVING TO AND FROM BED TO CHAIR: A LOT
DRESSING REGULAR LOWER BODY CLOTHING: A LOT
EATING MEALS: A LITTLE
PERSONAL GROOMING: A LITTLE
CLIMB 3 TO 5 STEPS WITH RAILING: A LOT
TURNING FROM BACK TO SIDE WHILE IN FLAT BAD: A LOT
STANDING UP FROM CHAIR USING ARMS: A LOT
DRESSING REGULAR LOWER BODY CLOTHING: A LOT
MOVING FROM LYING ON BACK TO SITTING ON SIDE OF FLAT BED WITH BEDRAILS: A LOT
MOVING TO AND FROM BED TO CHAIR: TOTAL
MOVING FROM LYING ON BACK TO SITTING ON SIDE OF FLAT BED WITH BEDRAILS: A LOT
DRESSING REGULAR UPPER BODY CLOTHING: A LOT
WALKING IN HOSPITAL ROOM: A LOT
TOILETING: A LOT
MOBILITY SCORE: 11
HELP NEEDED FOR BATHING: A LOT
STANDING UP FROM CHAIR USING ARMS: A LOT
HELP NEEDED FOR BATHING: A LOT

## 2025-05-14 ASSESSMENT — PAIN SCALES - GENERAL
PAINLEVEL_OUTOF10: 0 - NO PAIN
PAINLEVEL_OUTOF10: 2

## 2025-05-14 ASSESSMENT — PAIN - FUNCTIONAL ASSESSMENT
PAIN_FUNCTIONAL_ASSESSMENT: 0-10

## 2025-05-14 ASSESSMENT — PAIN DESCRIPTION - DESCRIPTORS: DESCRIPTORS: ACHING

## 2025-05-14 NOTE — PROGRESS NOTES
Medical Group Progress Note  ASSESSMENT & PLAN:        Acute provoked classifying this as a provoked PE with patient's left segmental and subsegmental PE  - Hypercoagulable state with her history of adenocarcinoma of the lungs  - CTA chest reviewed  - Echocardiogram with moderately enlarged RV with reduced RV SF consistent with cor pulmonale  - Heparin infusion for 48 hours of heparin infusion from 5/11 to 5/13  - Transition to apixaban 10 mg twice daily x 7 days followed by 5 mg twice daily     Adenocarcinoma of the lung status post bilateral upper lobe resection  - Previous history of bilateral upper lobes that were resected, patient has declined chemotherapy and has not seen oncology since late 2024  - CTA chest showing new right-sided nodules and masses consistent with recurrence of disease  - No longer seeking care or treatment at this time  - Hospice following    Hyponatremia  - Likely SIADH, with history of cancer  - Sodium 127 on 5/11 and 12   - Follow-up hyponatremia workup including urine sodium urine osmolality serum osmolality TSH cortisol (added on to today's labs)    Leukocytosis, reactive to above  - No focal consolidation or source of infection, hold off on antibiotics    Generalized anxiety  Hyperlipidemia  Hypothyroidism  Essential hypertension  Continue home medications as reconciled    Had a lengthy discussion with patient's 2 daughters, son, grandson at bedside along with hospice.  Will have PT/OT work with patient and attempt for skilled nursing facility placement.      If patient does not qualify, family aware that they will need to arrange for home hospice with 24/7 care with family for discharge likely on 5/13-5/14, otherwise discharge to SNF once pre-CERT is received and then transition to hospice.    Vinnie Esteves,     SUBJECTIVE     Patient was seen at bedside this morning.     OBJECTIVE:     Last Recorded Vitals:  Vitals:    05/13/25 0928 05/13/25 1105 05/13/25 1535 05/13/25 1913    BP:  91/66 113/59 99/58   BP Location:  Right arm Right arm    Patient Position:  Lying Sitting Sitting   Pulse: 90 71 78 80   Resp:  20 16 19   Temp:  36.5 °C (97.7 °F) 37.1 °C (98.8 °F) 36.1 °C (97 °F)   TempSrc:  Temporal Temporal    SpO2:  97% 95% 94%   Weight:       Height:         Last I/O:  I/O last 3 completed shifts:  In: 886.7 (10.7 mL/kg) [P.O.:360; I.V.:526.7 (6.3 mL/kg)]  Out: 825 (9.9 mL/kg) [Urine:825 (0.3 mL/kg/hr)]  Weight: 83.1 kg     Physical Exam  Vitals reviewed.   Constitutional:       General: She is not in acute distress.     Appearance: Normal appearance. She is not toxic-appearing.   HENT:      Head: Normocephalic and atraumatic.   Eyes:      Extraocular Movements: Extraocular movements intact.      Conjunctiva/sclera: Conjunctivae normal.   Cardiovascular:      Rate and Rhythm: Normal rate and regular rhythm.      Pulses: Normal pulses.      Heart sounds: Normal heart sounds.   Pulmonary:      Effort: Pulmonary effort is normal. No respiratory distress.      Breath sounds: Normal breath sounds. No wheezing.   Abdominal:      General: Bowel sounds are normal. There is no distension.      Palpations: Abdomen is soft.      Tenderness: There is no abdominal tenderness. There is no guarding.   Musculoskeletal:         General: Normal range of motion.      Cervical back: Normal range of motion and neck supple.   Neurological:      General: No focal deficit present.      Mental Status: She is alert. Mental status is at baseline.   Psychiatric:         Mood and Affect: Mood normal.         Behavior: Behavior normal.         Thought Content: Thought content normal.           Inpatient Medications:  Scheduled Medications[1]    PRN Medications  PRN Medications[2]    Continuous Medications:  Continuous Medications[3]  LABS AND IMAGING:     Labs:  Results from last 7 days   Lab Units 05/13/25  0541 05/12/25  0413 05/11/25  1719   WBC AUTO x10*3/uL 20.3* 20.3* 23.6*   RBC AUTO x10*6/uL 3.48* 3.42*  4.04   HEMOGLOBIN g/dL 9.4* 9.4* 11.2*   HEMATOCRIT % 29.5* 28.9* 34.6*   MCV fL 85 85 86   MCH pg 27.0 27.5 27.7   MCHC g/dL 31.9* 32.5 32.4   RDW % 15.6* 15.7* 15.4*   PLATELETS AUTO x10*3/uL 428 355 431     Results from last 7 days   Lab Units 05/13/25  0541 05/12/25  0413 05/11/25  1719   SODIUM mmol/L 129* 127* 127*   POTASSIUM mmol/L 3.9 3.9 4.0   CHLORIDE mmol/L 97* 99 94*   CO2 mmol/L 25 24 23   BUN mg/dL 13 15 18   CREATININE mg/dL 0.57 0.55 0.71   GLUCOSE mg/dL 119* 122* 110*   PROTEIN TOTAL g/dL 5.8* 5.8* 7.0   CALCIUM mg/dL 9.8 9.5 10.9*   BILIRUBIN TOTAL mg/dL 0.3 0.3 0.4   ALK PHOS U/L 78 75 90   AST U/L 11 15 19   ALT U/L 7 8 11     Results from last 7 days   Lab Units 05/13/25  0541 05/12/25  0413 05/11/25  1719   MAGNESIUM mg/dL 1.75 1.57* 1.36*     Results from last 7 days   Lab Units 05/11/25  1826 05/11/25  1719   TROPHS ng/L 6 7     Imaging:  Transthoracic Echo [place] [id number]   Tel [phone] Fax [phone]    TRANSTHORACIC ECHOCARDIOGRAM REPORT    Patient Name:       JANELL Lin Physician:    [id number] Joshua Shelby DO  Study Date:         5/12/2025           Ordering Provider:    [id number] HANNAH SAVAGE  MRN/PID:            [id number]            Fellow:  Accession#:         [id number]        Nurse:  Date of Birth/Age:  1945 / 80      Sonographer:          Beata Macedo RDCS  Gender Assigned at  F                   Additional Staff:  Birth:  Height:             170.18 cm           Admit Date:           5/11/2025  Weight:             83.01 kg            Admission Status:     Inpatient -                                                                 Routine  BSA / BMI:          1.95 m2 / 28.66     Department Location:  [facility]                      kg/m2                                     Echo Lab  Blood Pressure: 117 /58 mmHg    Study Type:    TRANSTHORACIC ECHO (TTE) COMPLETE  Diagnosis/ICD: Other pulmonary embolism without acute cor pulmonale-I26.99  Indication:    PE  CPT Codes:     Echo Complete w Full Doppler-[id number]    Patient History:  Smoker:            Current.  Pertinent History: PE, HTN, Hyperlipidemia, Previous DVT, Dyspnea and Syncope.    Study Detail: The following Echo studies were performed: 2D, M-Mode, Doppler and                color flow. Technically challenging study due to patient lying in                supine position and Sitting Up.       PHYSICIAN INTERPRETATION:  Left Ventricle: The left ventricular systolic function is normal, with a visually estimated ejection fraction of 60-65%. There is moderate concentric left ventricular hypertrophy. There are no regional wall motion abnormalities. The left ventricular cavity size is normal. There is moderately increased septal and moderately increased posterior left ventricular wall thickness. There is left ventricular concentric remodeling. Spectral Doppler shows a normal pattern of left ventricular diastolic filling.  LV Wall Scoring:  All segments are normal.    Left Atrium: The left atrial size is normal.  Right Ventricle: The right ventricle is moderately enlarged. There is moderately reduced right ventricular systolic function. The findings are consistent with cor pulmonale.  Right Atrium: The right atrial size is mild to moderately dilated.  Aortic Valve: The aortic valve appears structurally normal. There is no evidence of aortic valve stenosis.  The aortic valve dimensionless index is 0.92. There is no evidence of aortic valve regurgitation. The peak instantaneous gradient of the aortic valve is 10 mmHg. The mean gradient of the aortic valve is 6 mmHg.  Mitral Valve: The mitral  valve is normal in structure. There is no evidence of mitral valve stenosis. There is normal mitral valve leaflet mobility. There is mild mitral valve regurgitation.  Tricuspid Valve: The tricuspid valve is structurally normal. There is normal tricuspid valve leaflet mobility. There is moderate to severe tricuspid regurgitation.  Pulmonic Valve: The pulmonic valve is not well visualized. There is no indication of pulmonic valve regurgitation.  Pericardium: No pericardial effusion noted.  Aorta: The aortic root is normal.  Pulmonary Artery: Pulmonary hypertension is present. The tricuspid regurgitant velocity is 4.12 m/s, and with an estimated right atrial pressure of 3 mmHg, the estimated pulmonary artery pressure is severely elevated with the RVSP at 70.9 mmHg.  In comparison to the previous echocardiogram(s): The left ventricular function is unchanged. The left ventricular diastolic function is normal. The right ventricular systolic function is significantly worse.       CONCLUSIONS:   1. The left ventricular systolic function is normal, with a visually estimated ejection fraction of 60-65%.   2. There is moderately reduced right ventricular systolic function.   3. The findings are consistent with cor pulmonale.   4. Moderately enlarged right ventricle.   5. The right atrial size is mild to moderately dilated.   6. Mild mitral valve regurgitation.   7. Moderate to severe tricuspid regurgitation.   8. Aortic valve stenosis is not present.   9. Pulmonary hypertension is present.  10. Severely elevated pulmonary artery pressure.  11. There is moderately increased septal and moderately increased posterior left ventricular wall thickness.    RECOMMENDATIONS:  Technically suboptimal and limited study, therefore accuracy of above interpretation could be substantially diminished. Clinical correlation is advised. Consider additional imaging modalities if clinically indicated.       QUANTITATIVE DATA SUMMARY:     2D  MEASUREMENTS:             Normal Ranges:  Ao Root d:       2.30 cm     (2.0-3.7cm)  IVSd:            1.33 cm     (0.6-1.1cm)  LVPWd:           1.33 cm     (0.6-1.1cm)  LVIDd:           3.56 cm     (3.9-5.9cm)  LVIDs:           2.38 cm  LV Mass Index:   83.9 g/m2  LVEDV Index:     27.44 ml/m2  LV % FS          33.1 %       LEFT ATRIUM:                  Normal Ranges:  LA Vol A4C:        32.3 ml    (22+/-6mL/m2)  LA Vol A2C:        33.8 ml  LA Vol BP:         34.0 ml  LA Vol Index A4C:  16.6ml/m2  LA Vol Index A2C:  17.4 ml/m2  LA Vol Index BP:   17.5 ml/m2  LA Area A4C:       14.2 cm2  LA Area A2C:       14.1 cm2  LA Major Axis A4C: 5.3 cm  LA Major Axis A2C: 5.0 cm  LA Volume Index:   16.1 ml/m2       LV SYSTOLIC FUNCTION:                       Normal Ranges:  EF-A4C View:    66 % (>=55%)  EF-A2C View:    65 %  EF-Biplane:     66 %  EF-Visual:      63 %  LV EF Reported: 63 %       LV DIASTOLIC FUNCTION:           Normal Ranges:  MV Peak E:             0.86 m/s  (0.7-1.2 m/s)  MV Peak A:             0.83 m/s  (0.42-0.7 m/s)  E/A Ratio:             1.03      (1.0-2.2)  MV e'                  0.090 m/s (>8.0)  MV lateral e'          0.11 m/s  MV medial e'           0.07 m/s  E/e' Ratio:            9.53      (<8.0)       MITRAL VALVE:          Normal Ranges:  MV DT:        153 msec (150-240msec)       AORTIC VALVE:                      Normal Ranges:  AoV Vmax:                1.58 m/s  (<=1.7m/s)  AoV Peak PG:             10.0 mmHg (<20mmHg)  AoV Mean P.0 mmHg  (1.7-11.5mmHg)  LVOT Max Noe:            1.13 m/s  (<=1.1m/s)  AoV VTI:                 25.20 cm  (18-25cm)  LVOT VTI:                23.30 cm  LVOT Diameter:           2.00 cm   (1.8-2.4cm)  AoV Area, VTI:           2.90 cm2  (2.5-5.5cm2)  AoV Area,Vmax:           2.25 cm2  (2.5-4.5cm2)  AoV Dimensionless Index: 0.92       RIGHT VENTRICLE:  RV Basal 3.94 cm  RV Mid   2.79 cm  RV Major 6.5 cm  TAPSE:   23.9 mm       TRICUSPID VALVE/RVSP:           Normal Ranges:  Peak TR Velocity:     4.12 m/s  RV Syst Pressure:     71 mmHg  (< 30mmHg)       PULMONIC VALVE:          Normal Ranges:  PV Accel Time:  61 msec  (>120ms)  PV Max Noe:     1.3 m/s  (0.6-0.9m/s)  PV Max P.6 mmHg       [id number] Joshua Shelby   Electronically signed on 2025 at 11:14:06 AM       Wall Scoring       ** Final **  ECG 12 Lead  Sinus rhythm with Premature supraventricular complexes  Right bundle branch block  Possible Lateral infarct , age undetermined  Abnormal ECG  When compared with ECG of 11-MAY-2025 17:05, (unconfirmed)  Left anterior fascicular block is no longer Present  ST no longer depressed in Lateral leads  T wave amplitude has decreased in Lateral leads  See ED provider note for full interpretation and clinical correlation  Confirmed by Loren Pratt ([id number]) on 2025 10:26:25 AM  ECG 12 Lead  Sinus rhythm with Premature atrial complexes  Right bundle branch block  Left anterior fascicular block  ** Bifascicular block **  Abnormal ECG  When compared with ECG of 23-DEC-2024 14:45,  Premature ventricular complexes are no longer Present  Premature atrial complexes are now Present  Nonspecific T wave abnormality has replaced inverted T waves in Inferior leads  T wave inversion now evident in Anterior leads  See ED provider note for full interpretation and clinical correlation  Confirmed by Loren Pratt ([id number]) on 2025 10:26:15 AM          [1] apixaban, 10 mg, oral, BID   Followed by  [START ON 2025] apixaban, 5 mg, oral, BID  cyanocobalamin, 250 mcg, oral, Daily  tiotropium, 2 puff, inhalation, Daily   And  fluticasone furoate-vilanteroL, 1 puff, inhalation, Daily  levothyroxine, 88 mcg, oral, Daily  oxygen, , inhalation, Continuous - Inhalation  pantoprazole, 40 mg, oral, Daily   Or  pantoprazole, 40 mg, intravenous, Daily  sertraline, 50 mg, oral, Daily  simvastatin, 40 mg, oral, Nightly     [2] PRN medications: acetaminophen **OR** acetaminophen **OR**  acetaminophen, benzonatate, heparin, ondansetron **OR** ondansetron  [3]

## 2025-05-14 NOTE — PROGRESS NOTES
05/14/25 1450   Discharge Planning   Type of Home Care Services Hospice  ([facility])   Expected Discharge Disposition Home   Does the patient need discharge transport arranged? Yes   RoundTrip coordination needed? Yes   Has discharge transport been arranged? No     Pt and daughter met with Hospice today and consents were signed. Plan is for pt to discharge home tomorrow with Hospice care. Hospice to order DME that she be delivered to the home tomorrow morning. Plan is for discharge tomorrow afternoon, Hospice to coordinate.

## 2025-05-14 NOTE — PROGRESS NOTES
Brianne Mallory is a 80 y.o. female on day 2 of admission presenting with Other pulmonary embolism without acute cor pulmonale.    Acute provoked classifying this as a provoked PE with patient's left segmental and subsegmental PE     Hypercoagulable state with her history of adenocarcinoma of the lungs     CTA chest reviewed     Echocardiogram with moderately enlarged RV with reduced RV SF consistent with cor pulmonale     Heparin infusion for 48 hours of heparin infusion from 5/11 to 5/13     Transition to apixaban 10 mg twice daily x 7 days followed by 5 mg twice daily     Adenocarcinoma of the lung status post bilateral upper lobe resection     Previous history of bilateral upper lobes that were resected, patient has declined chemotherapy and has not seen oncology since late 2024     CTA chest showing new right-sided nodules and masses consistent with recurrence of disease     No longer seeking care or treatment at this time     Hospice following     Hyponatremia     Likely SIADH, with history of cancer     Sodium 127 on 5/11 and 12      Follow-up hyponatremia workup including urine sodium urine osmolality serum osmolality TSH cortisol (added on to today's labs)     Leukocytosis, reactive to above     No focal consolidation or source of infection, hold off on antibiotics     Generalized anxiety Hyperlipidemia Hypothyroidism Essential hypertension Continue home medications as reconciled     Pulmonary:- OK to discharge to SNF per Pulmonary. Family wants to transition to Hospice after transferring to SNF.     I spent 25 minutes in the professional and overall care of this patient.      Rodger Silva MD

## 2025-05-14 NOTE — PROGRESS NOTES
Medical Group Progress Note  ASSESSMENT & PLAN:        Acute provoked classifying this as a provoked PE with patient's left segmental and subsegmental PE  - Hypercoagulable state with her history of adenocarcinoma of the lungs  - CTA chest reviewed  - Echocardiogram with moderately enlarged RV with reduced RV SF consistent with cor pulmonale  - Heparin infusion for 48 hours of heparin infusion from 5/11 to 5/13  - Transition to apixaban 10 mg twice daily x 7 days followed by 5 mg twice daily     Adenocarcinoma of the lung status post bilateral upper lobe resection  - Previous history of bilateral upper lobes that were resected, patient has declined chemotherapy and has not seen oncology since late 2024  - CTA chest showing new right-sided nodules and masses consistent with recurrence of disease  - No longer seeking care or treatment at this time  - Hospice following    Hyponatremia  - Likely SIADH, with history of cancer  - Sodium 127 on 5/11 and 12   - Follow-up hyponatremia workup including urine sodium urine osmolality serum osmolality TSH cortisol (added on to today's labs)    Leukocytosis, reactive to above  - No focal consolidation or source of infection, hold off on antibiotics    Generalized anxiety  Hyperlipidemia  Hypothyroidism  Essential hypertension  Continue home medications as reconciled    Likely discharging home tomorrow, with hospice..    Vinnie Esteves,     SUBJECTIVE     Patient was seen at bedside this morning. Feels well.    OBJECTIVE:     Last Recorded Vitals:  Vitals:    05/14/25 0740 05/14/25 0852 05/14/25 1114 05/14/25 1532   BP: 126/70  112/59 107/56   Patient Position:       Pulse: 78  80 73   Resp:       Temp: 36.8 °C (98.2 °F)  36.9 °C (98.4 °F) 36.7 °C (98.1 °F)   TempSrc:       SpO2: 95% 97% 94% 99%   Weight:       Height:         Last I/O:  I/O last 3 completed shifts:  In: 1109.7 (13.4 mL/kg) [P.O.:360; I.V.:749.7 (9 mL/kg)]  Out: 325 (3.9 mL/kg) [Urine:325 (0.1  mL/kg/hr)]  Weight: 83.1 kg     Physical Exam  Vitals reviewed.   Constitutional:       General: She is not in acute distress.     Appearance: Normal appearance. She is not toxic-appearing.   HENT:      Head: Normocephalic and atraumatic.   Eyes:      Extraocular Movements: Extraocular movements intact.      Conjunctiva/sclera: Conjunctivae normal.   Cardiovascular:      Rate and Rhythm: Normal rate and regular rhythm.      Pulses: Normal pulses.      Heart sounds: Normal heart sounds.   Pulmonary:      Effort: Pulmonary effort is normal. No respiratory distress.      Breath sounds: Normal breath sounds. No wheezing.   Abdominal:      General: Bowel sounds are normal. There is no distension.      Palpations: Abdomen is soft.      Tenderness: There is no abdominal tenderness. There is no guarding.   Musculoskeletal:         General: Normal range of motion.      Cervical back: Normal range of motion and neck supple.   Neurological:      General: No focal deficit present.      Mental Status: She is alert. Mental status is at baseline.   Psychiatric:         Mood and Affect: Mood normal.         Behavior: Behavior normal.         Thought Content: Thought content normal.           Inpatient Medications:  Scheduled Medications[1]    PRN Medications  PRN Medications[2]    Continuous Medications:  Continuous Medications[3]  LABS AND IMAGING:     Labs:  Results from last 7 days   Lab Units 05/14/25 0534 05/13/25  0541 05/12/25  0413   WBC AUTO x10*3/uL 21.1* 20.3* 20.3*   RBC AUTO x10*6/uL 3.50* 3.48* 3.42*   HEMOGLOBIN g/dL 9.6* 9.4* 9.4*   HEMATOCRIT % 29.9* 29.5* 28.9*   MCV fL 85 85 85   MCH pg 27.4 27.0 27.5   MCHC g/dL 32.1 31.9* 32.5   RDW % 15.7* 15.6* 15.7*   PLATELETS AUTO x10*3/uL 428 428 355     Results from last 7 days   Lab Units 05/14/25 0534 05/13/25  0541 05/12/25  0413   SODIUM mmol/L 129* 129* 127*   POTASSIUM mmol/L 3.9 3.9 3.9   CHLORIDE mmol/L 97* 97* 99   CO2 mmol/L 26 25 24   BUN mg/dL 12 13 15    CREATININE mg/dL 0.61 0.57 0.55   GLUCOSE mg/dL 105* 119* 122*   PROTEIN TOTAL g/dL 5.8* 5.8* 5.8*   CALCIUM mg/dL 10.3 9.8 9.5   BILIRUBIN TOTAL mg/dL 0.4 0.3 0.3   ALK PHOS U/L 77 78 75   AST U/L 10 11 15   ALT U/L 7 7 8     Results from last 7 days   Lab Units 05/14/25  0534 05/13/25  0541 05/12/25  0413   MAGNESIUM mg/dL 1.59* 1.75 1.57*     Results from last 7 days   Lab Units 05/11/25  1826 05/11/25  1719   TROPHS ng/L 6 7     Imaging:  Transthoracic Echo Complete            [place] [address]   Tel [phone] Fax [phone]    TRANSTHORACIC ECHOCARDIOGRAM REPORT    Patient Name:       JANELL Lin Physician:    [id number] Joshua Shelby DO  Study Date:         5/12/2025           Ordering Provider:    [id number] HANNAH SAVAGE  MRN/PID:            [id number]            Fellow:  Accession#:         [id number]        Nurse:  Date of Birth/Age:  1945 / 80      Sonographer:          Beata Macedo RDCS  Gender Assigned at  F                   Additional Staff:  Birth:  Height:             170.18 cm           Admit Date:           5/11/2025  Weight:             83.01 kg            Admission Status:     Inpatient -                                                                Routine  BSA / BMI:          1.95 m2 / 28.66     Department Location:  [place] [address]                                     Echo Lab  Blood Pressure: 117 /58 mmHg    Study Type:    TRANSTHORACIC ECHO (TTE) COMPLETE  Diagnosis/ICD: Other pulmonary embolism without acute cor pulmonale-I26.99  Indication:    PE  CPT Codes:     Echo Complete w Full Doppler-[id number]    Patient History:  Smoker:             Current.  Pertinent History: PE, HTN, Hyperlipidemia, Previous DVT, Dyspnea and Syncope.    Study Detail: The following Echo studies were performed: 2D, M-Mode, Doppler and                color flow. Technically challenging study due to patient lying in                supine position and Sitting Up.       PHYSICIAN INTERPRETATION:  Left Ventricle: The left ventricular systolic function is normal, with a visually estimated ejection fraction of 60-65%. There is moderate concentric left ventricular hypertrophy. There are no regional wall motion abnormalities. The left ventricular cavity size is normal. There is moderately increased septal and moderately increased posterior left ventricular wall thickness. There is left ventricular concentric remodeling. Spectral Doppler shows a normal pattern of left ventricular diastolic filling.  LV Wall Scoring:  All segments are normal.    Left Atrium: The left atrial size is normal.  Right Ventricle: The right ventricle is moderately enlarged. There is moderately reduced right ventricular systolic function. The findings are consistent with cor pulmonale.  Right Atrium: The right atrial size is mild to moderately dilated.  Aortic Valve: The aortic valve appears structurally normal. There is no evidence of aortic valve stenosis.  The aortic valve dimensionless index is 0.92. There is no evidence of aortic valve regurgitation. The peak instantaneous gradient of the aortic valve is 10 mmHg. The mean gradient of the aortic valve is 6 mmHg.  Mitral Valve: The mitral valve is normal in structure. There is no evidence of mitral valve stenosis. There is normal mitral valve leaflet mobility. There is mild mitral valve regurgitation.  Tricuspid Valve: The tricuspid valve is structurally normal. There is normal tricuspid valve leaflet mobility. There is moderate to severe tricuspid regurgitation.  Pulmonic Valve: The pulmonic valve is not well visualized. There is no indication of pulmonic  valve regurgitation.  Pericardium: No pericardial effusion noted.  Aorta: The aortic root is normal.  Pulmonary Artery: Pulmonary hypertension is present. The tricuspid regurgitant velocity is 4.12 m/s, and with an estimated right atrial pressure of 3 mmHg, the estimated pulmonary artery pressure is severely elevated with the RVSP at 70.9 mmHg.  In comparison to the previous echocardiogram(s): The left ventricular function is unchanged. The left ventricular diastolic function is normal. The right ventricular systolic function is significantly worse.       CONCLUSIONS:   1. The left ventricular systolic function is normal, with a visually estimated ejection fraction of 60-65%.   2. There is moderately reduced right ventricular systolic function.   3. The findings are consistent with cor pulmonale.   4. Moderately enlarged right ventricle.   5. The right atrial size is mild to moderately dilated.   6. Mild mitral valve regurgitation.   7. Moderate to severe tricuspid regurgitation.   8. Aortic valve stenosis is not present.   9. Pulmonary hypertension is present.  10. Severely elevated pulmonary artery pressure.  11. There is moderately increased septal and moderately increased posterior left ventricular wall thickness.    RECOMMENDATIONS:  Technically suboptimal and limited study, therefore accuracy of above interpretation could be substantially diminished. Clinical correlation is advised. Consider additional imaging modalities if clinically indicated.       QUANTITATIVE DATA SUMMARY:     2D MEASUREMENTS:             Normal Ranges:  Ao Root d:       2.30 cm     (2.0-3.7cm)  IVSd:            1.33 cm     (0.6-1.1cm)  LVPWd:           1.33 cm     (0.6-1.1cm)  LVIDd:           3.56 cm     (3.9-5.9cm)  LVIDs:           2.38 cm  LV Mass Index:   83.9 g/m2  LVEDV Index:     27.44 ml/m2  LV % FS          33.1 %       LEFT ATRIUM:                  Normal Ranges:  LA Vol A4C:        32.3 ml    (22+/-6mL/m2)  LA Vol A2C:         33.8 ml  LA Vol BP:         34.0 ml  LA Vol Index A4C:  16.6ml/m2  LA Vol Index A2C:  17.4 ml/m2  LA Vol Index BP:   17.5 ml/m2  LA Area A4C:       14.2 cm2  LA Area A2C:       14.1 cm2  LA Major Axis A4C: 5.3 cm  LA Major Axis A2C: 5.0 cm  LA Volume Index:   16.1 ml/m2       LV SYSTOLIC FUNCTION:                       Normal Ranges:  EF-A4C View:    66 % (>=55%)  EF-A2C View:    65 %  EF-Biplane:     66 %  EF-Visual:      63 %  LV EF Reported: 63 %       LV DIASTOLIC FUNCTION:           Normal Ranges:  MV Peak E:             0.86 m/s  (0.7-1.2 m/s)  MV Peak A:             0.83 m/s  (0.42-0.7 m/s)  E/A Ratio:             1.03      (1.0-2.2)  MV e'                  0.090 m/s (>8.0)  MV lateral e'          0.11 m/s  MV medial e'           0.07 m/s  E/e' Ratio:            9.53      (<8.0)       MITRAL VALVE:          Normal Ranges:  MV DT:        153 msec (150-240msec)       AORTIC VALVE:                      Normal Ranges:  AoV Vmax:                1.58 m/s  (<=1.7m/s)  AoV Peak PG:             10.0 mmHg (<20mmHg)  AoV Mean P.0 mmHg  (1.7-11.5mmHg)  LVOT Max Noe:            1.13 m/s  (<=1.1m/s)  AoV VTI:                 25.20 cm  (18-25cm)  LVOT VTI:                23.30 cm  LVOT Diameter:           2.00 cm   (1.8-2.4cm)  AoV Area, VTI:           2.90 cm2  (2.5-5.5cm2)  AoV Area,Vmax:           2.25 cm2  (2.5-4.5cm2)  AoV Dimensionless Index: 0.92       RIGHT VENTRICLE:  RV Basal 3.94 cm  RV Mid   2.79 cm  RV Major 6.5 cm  TAPSE:   23.9 mm       TRICUSPID VALVE/RVSP:          Normal Ranges:  Peak TR Velocity:     4.12 m/s  RV Syst Pressure:     71 mmHg  (< 30mmHg)       PULMONIC VALVE:          Normal Ranges:  PV Accel Time:  61 msec  (>120ms)  PV Max Noe:     1.3 m/s  (0.6-0.9m/s)  PV Max P.6 mmHg       [id number] Joshua Shelby DO  Electronically signed on 2025 at 11:14:06 AM       Wall Scoring       ** Final **  ECG 12 Lead  Sinus rhythm with Premature supraventricular complexes  Right  bundle branch block  Possible Lateral infarct , age undetermined  Abnormal ECG  When compared with ECG of 11-MAY-2025 17:05, (unconfirmed)  Left anterior fascicular block is no longer Present  ST no longer depressed in Lateral leads  T wave amplitude has decreased in Lateral leads  See ED provider note for full interpretation and clinical correlation  Confirmed by Loren Pratt ([id number]) on 5/12/2025 10:26:25 AM  ECG 12 Lead  Sinus rhythm with Premature atrial complexes  Right bundle branch block  Left anterior fascicular block  ** Bifascicular block **  Abnormal ECG  When compared with ECG of 23-DEC-2024 14:45,  Premature ventricular complexes are no longer Present  Premature atrial complexes are now Present  Nonspecific T wave abnormality has replaced inverted T waves in Inferior leads  T wave inversion now evident in Anterior leads  See ED provider note for full interpretation and clinical correlation  Confirmed by Loren Pratt ([id number]) on 5/12/2025 10:26:15 AM          [1] apixaban, 10 mg, oral, BID   Followed by  [START ON 5/20/2025] apixaban, 5 mg, oral, BID  cyanocobalamin, 250 mcg, oral, Daily  tiotropium, 2 puff, inhalation, Daily   And  fluticasone furoate-vilanteroL, 1 puff, inhalation, Daily  levothyroxine, 88 mcg, oral, Daily  oxygen, , inhalation, Continuous - Inhalation  pantoprazole, 40 mg, oral, Daily   Or  pantoprazole, 40 mg, intravenous, Daily  sertraline, 50 mg, oral, Daily  simvastatin, 40 mg, oral, Nightly     [2] PRN medications: acetaminophen **OR** acetaminophen **OR** acetaminophen, benzonatate, dextromethorphan-guaifenesin, heparin, ondansetron **OR** ondansetron  [3]

## 2025-05-14 NOTE — CARE PLAN
Problem: Fall/Injury  Goal: Not fall by end of shift  5/14/2025 1733 by Milli Rodriguez RN  Outcome: Progressing  5/14/2025 0749 by Milli Rodriguez RN  Reactivated  Goal: Be free from injury by end of the shift  5/14/2025 1733 by Milli Rodriguez RN  Outcome: Progressing  5/14/2025 0749 by Milli Rodriguez RN  Reactivated  Goal: Verbalize understanding of personal risk factors for fall in the hospital  5/14/2025 1733 by Milli Rodriguez RN  Outcome: Progressing  5/14/2025 0749 by Milli Rodriguez RN  Reactivated  Goal: Verbalize understanding of risk factor reduction measures to prevent injury from fall in the home  5/14/2025 1733 by Milli Rodriguez RN  Outcome: Progressing  5/14/2025 0749 by Milli Rodriguez RN  Reactivated  Goal: Use assistive devices by end of the shift  5/14/2025 1733 by Milli Rodriguez RN  Outcome: Progressing  5/14/2025 0749 by Milli Rodriguez RN  Reactivated  Goal: Pace activities to prevent fatigue by end of the shift  5/14/2025 1733 by Milli Rodriguez RN  Outcome: Progressing  5/14/2025 0749 by Milli Rodriguez RN  Reactivated     Problem: Pain - Adult  Goal: Verbalizes/displays adequate comfort level or baseline comfort level  5/14/2025 1733 by Milli Rodriguez RN  Outcome: Progressing  5/14/2025 0750 by Milli Rodriguez RN  Flowsheets (Taken 5/14/2025 0750)  Verbalizes/displays adequate comfort level or baseline comfort level:   Encourage patient to monitor pain and request assistance   Administer analgesics based on type and severity of pain and evaluate response   Consider cultural and social influences on pain and pain management   Assess pain using appropriate pain scale   Implement non-pharmacological measures as appropriate and evaluate response   Notify Licensed Independent Practitioner if interventions unsuccessful or patient reports new pain  5/14/2025 0749 by Milli Rodriguez RN  Reactivated     Problem: Safety - Adult  Goal: Free from fall injury  5/14/2025 1733 by  Milli S Rodriguez, RN  Outcome: Progressing  5/14/2025 0750 by Milli Rodriguez RN  Flowsheets (Taken 5/14/2025 0750)  Free from fall injury: Instruct family/caregiver on patient safety  5/14/2025 0749 by Milli Rodriguez RN  Reactivated     Problem: Discharge Planning  Goal: Discharge to home or other facility with appropriate resources  5/14/2025 1733 by Milli Rodriguez RN  Outcome: Progressing  5/14/2025 0750 by Milli Rodriguez RN  Flowsheets (Taken 5/14/2025 0750)  Discharge to home or other facility with appropriate resources:   Identify barriers to discharge with patient and caregiver   Identify discharge learning needs (meds, wound care, etc)   Refer to discharge planning if patient needs post-hospital services based on physician order or complex needs related to functional status, cognitive ability or social support system   Arrange for needed discharge resources and transportation as appropriate  5/14/2025 0749 by Milli Rodriguez RN  Reactivated     Problem: Chronic Conditions and Co-morbidities  Goal: Patient's chronic conditions and co-morbidity symptoms are monitored and maintained or improved  5/14/2025 1733 by Milli Rodriguez RN  Outcome: Progressing  5/14/2025 0750 by Milli Rodriguez RN  Flowsheets (Taken 5/14/2025 0750)  Care Plan - Patient's Chronic Conditions and Co-Morbidity Symptoms are Monitored and Maintained or Improved:   Monitor and assess patient's chronic conditions and comorbid symptoms for stability, deterioration, or improvement   Collaborate with multidisciplinary team to address chronic and comorbid conditions and prevent exacerbation or deterioration   Update acute care plan with appropriate goals if chronic or comorbid symptoms are exacerbated and prevent overall improvement and discharge  5/14/2025 0749 by Milli Rodriguez RN  Reactivated     Problem: Nutrition  Goal: Nutrient intake appropriate for maintaining nutritional needs  5/14/2025 1733 by Milli Rodriguez RN  Outcome:  Progressing  5/14/2025 0749 by Milli Rodriguez RN  Reactivated     Problem: Skin  Goal: Decreased wound size/increased tissue granulation at next dressing change  5/14/2025 1733 by Milli Rodriguez RN  Outcome: Progressing  5/14/2025 0750 by Milli Rodriguez RN  Flowsheets (Taken 5/14/2025 0750)  Decreased wound size/increased tissue granulation at next dressing change: Promote sleep for wound healing  Goal: Participates in plan/prevention/treatment measures  5/14/2025 1733 by Milli Rodriguez RN  Outcome: Progressing  5/14/2025 0750 by Milli Rodriguez RN  Flowsheets (Taken 5/14/2025 0750)  Participates in plan/prevention/treatment measures:   Discuss with provider PT/OT consult   Elevate heels  Goal: Prevent/manage excess moisture  5/14/2025 1733 by Milli Rodriguez RN  Outcome: Progressing  5/14/2025 0750 by Milli Rodriguez RN  Flowsheets (Taken 5/14/2025 0750)  Prevent/manage excess moisture:   Cleanse incontinence/protect with barrier cream   Moisturize dry skin   Monitor for/manage infection if present  Goal: Prevent/minimize sheer/friction injuries  5/14/2025 1733 by Milli Rodriguez RN  Outcome: Progressing  5/14/2025 0750 by Milli Rodriguez RN  Flowsheets (Taken 5/14/2025 0750)  Prevent/minimize sheer/friction injuries:   Complete micro-shifts as needed if patient unable. Adjust patient position to relieve pressure points, not a full turn   Use pull sheet   HOB 30 degrees or less   Turn/reposition every 2 hours/use positioning/transfer devices  Goal: Promote/optimize nutrition  5/14/2025 1733 by Milli Rodriguez RN  Outcome: Progressing  5/14/2025 0750 by Milli Rodriguez RN  Flowsheets (Taken 5/14/2025 0750)  Promote/optimize nutrition:   Assist with feeding   Monitor/record intake including meals   Offer water/supplements/favorite foods  Goal: Promote skin healing  5/14/2025 1733 by Milli Rodriguez RN  Outcome: Progressing  5/14/2025 0750 by Milli Rodriguez RN  Flowsheets (Taken 5/14/2025  0750)  Promote skin healing:   Assess skin/pad under line(s)/device(s)   Ensure correct size (line/device) and apply per  instructions   Rotate device position/do not position patient on device   The patient's goals for the shift include      The clinical goals for the shift include Patient will remain free from episodes of dyspnea throughout this shift.    Over the shift, the patient did make progress toward care plan goals.

## 2025-05-14 NOTE — CARE PLAN
Problem: Skin  Goal: Decreased wound size/increased tissue granulation at next dressing change  Outcome: Progressing  Goal: Participates in plan/prevention/treatment measures  Outcome: Progressing  Goal: Prevent/manage excess moisture  5/14/2025 0511 by Sarina Olivera RN  Outcome: Progressing  5/13/2025 1953 by Sarina Olivera RN  Flowsheets (Taken 5/13/2025 1953)  Prevent/manage excess moisture:   Moisturize dry skin   Cleanse incontinence/protect with barrier cream  Goal: Prevent/minimize sheer/friction injuries  Outcome: Progressing  Goal: Promote/optimize nutrition  Outcome: Progressing  Goal: Promote skin healing  Outcome: Progressing   The patient's goals for the shift include      The clinical goals for the shift include safety    Over the shift, the patient did make progress toward the following goals.

## 2025-05-15 VITALS
SYSTOLIC BLOOD PRESSURE: 98 MMHG | TEMPERATURE: 97.9 F | OXYGEN SATURATION: 99 % | BODY MASS INDEX: 28.75 KG/M2 | RESPIRATION RATE: 16 BRPM | DIASTOLIC BLOOD PRESSURE: 55 MMHG | HEART RATE: 78 BPM | WEIGHT: 183.2 LBS | HEIGHT: 67 IN

## 2025-05-15 PROCEDURE — 99239 HOSP IP/OBS DSCHRG MGMT >30: CPT | Performed by: STUDENT IN AN ORGANIZED HEALTH CARE EDUCATION/TRAINING PROGRAM

## 2025-05-15 PROCEDURE — 2500000001 HC RX 250 WO HCPCS SELF ADMINISTERED DRUGS (ALT 637 FOR MEDICARE OP): Performed by: STUDENT IN AN ORGANIZED HEALTH CARE EDUCATION/TRAINING PROGRAM

## 2025-05-15 PROCEDURE — 2500000001 HC RX 250 WO HCPCS SELF ADMINISTERED DRUGS (ALT 637 FOR MEDICARE OP): Performed by: NURSE PRACTITIONER

## 2025-05-15 PROCEDURE — 2500000005 HC RX 250 GENERAL PHARMACY W/O HCPCS: Performed by: EMERGENCY MEDICINE

## 2025-05-15 PROCEDURE — 2500000002 HC RX 250 W HCPCS SELF ADMINISTERED DRUGS (ALT 637 FOR MEDICARE OP, ALT 636 FOR OP/ED): Performed by: STUDENT IN AN ORGANIZED HEALTH CARE EDUCATION/TRAINING PROGRAM

## 2025-05-15 RX ADMIN — Medication 4 L/MIN: at 08:38

## 2025-05-15 RX ADMIN — TIOTROPIUM BROMIDE INHALATION SPRAY 2 PUFF: 3.12 SPRAY, METERED RESPIRATORY (INHALATION) at 06:24

## 2025-05-15 RX ADMIN — FLUTICASONE FUROATE AND VILANTEROL TRIFENATATE 1 PUFF: 100; 25 POWDER RESPIRATORY (INHALATION) at 06:24

## 2025-05-15 RX ADMIN — APIXABAN 10 MG: 5 TABLET, FILM COATED ORAL at 08:37

## 2025-05-15 RX ADMIN — PANTOPRAZOLE SODIUM 40 MG: 40 TABLET, DELAYED RELEASE ORAL at 06:24

## 2025-05-15 RX ADMIN — LEVOTHYROXINE SODIUM 88 MCG: 0.09 TABLET ORAL at 06:24

## 2025-05-15 RX ADMIN — Medication 250 MCG: at 08:37

## 2025-05-15 RX ADMIN — SERTRALINE HYDROCHLORIDE 50 MG: 50 TABLET, FILM COATED ORAL at 08:37

## 2025-05-15 ASSESSMENT — COGNITIVE AND FUNCTIONAL STATUS - GENERAL
TURNING FROM BACK TO SIDE WHILE IN FLAT BAD: A LOT
TOILETING: A LOT
MOVING FROM LYING ON BACK TO SITTING ON SIDE OF FLAT BED WITH BEDRAILS: A LOT
EATING MEALS: A LITTLE
DRESSING REGULAR UPPER BODY CLOTHING: A LOT
STANDING UP FROM CHAIR USING ARMS: A LOT
WALKING IN HOSPITAL ROOM: A LOT
CLIMB 3 TO 5 STEPS WITH RAILING: A LOT
HELP NEEDED FOR BATHING: A LOT
DAILY ACTIVITIY SCORE: 14
PERSONAL GROOMING: A LITTLE
MOBILITY SCORE: 12
MOVING TO AND FROM BED TO CHAIR: A LOT
DRESSING REGULAR LOWER BODY CLOTHING: A LOT

## 2025-05-15 ASSESSMENT — PAIN SCALES - GENERAL: PAINLEVEL_OUTOF10: 0 - NO PAIN

## 2025-05-15 NOTE — CARE PLAN
Problem: Fall/Injury  Goal: Not fall by end of shift  Outcome: Progressing  Goal: Be free from injury by end of the shift  Outcome: Progressing  Goal: Verbalize understanding of personal risk factors for fall in the hospital  Outcome: Progressing  Goal: Verbalize understanding of risk factor reduction measures to prevent injury from fall in the home  Outcome: Progressing  Goal: Use assistive devices by end of the shift  Outcome: Progressing  Goal: Pace activities to prevent fatigue by end of the shift  Outcome: Progressing   The patient's goals for the shift include      The clinical goals for the shift include safety/ pulseOx within normal range    Over the shift, the patient did make progress toward the following goals.

## 2025-05-15 NOTE — PROGRESS NOTES
Physical Therapy                 Therapy Communication Note    Patient Name: Brianne Mallory  MRN: [id number]  Department: [facility]  Room: [address] [place] [address]  Today's Date: 5/15/2025     Discipline: Physical Therapy    Missed Visit:       Missed Visit Reason: Missed Visit Reason: Other (Comment) (DC PT pt signed on with hospice)        Comment:DC To hospice

## 2025-05-15 NOTE — CARE PLAN
Problem: Fall/Injury  Goal: Not fall by end of shift  Outcome: Adequate for Discharge  Goal: Be free from injury by end of the shift  Outcome: Adequate for Discharge  Goal: Verbalize understanding of personal risk factors for fall in the hospital  Outcome: Adequate for Discharge  Goal: Verbalize understanding of risk factor reduction measures to prevent injury from fall in the home  Outcome: Adequate for Discharge  Goal: Use assistive devices by end of the shift  Outcome: Adequate for Discharge  Goal: Pace activities to prevent fatigue by end of the shift  Outcome: Adequate for Discharge     Problem: Pain - Adult  Goal: Verbalizes/displays adequate comfort level or baseline comfort level  5/15/2025 1553 by Milli Rodriguez RN  Outcome: Adequate for Discharge  5/15/2025 0750 by Milli Rodriguez RN  Flowsheets (Taken 5/15/2025 0750)  Verbalizes/displays adequate comfort level or baseline comfort level:   Encourage patient to monitor pain and request assistance   Administer analgesics based on type and severity of pain and evaluate response   Consider cultural and social influences on pain and pain management   Assess pain using appropriate pain scale   Implement non-pharmacological measures as appropriate and evaluate response   Notify Licensed Independent Practitioner if interventions unsuccessful or patient reports new pain     Problem: Safety - Adult  Goal: Free from fall injury  5/15/2025 1553 by Milli Rodriguez RN  Outcome: Adequate for Discharge  5/15/2025 0750 by Milli Rodriguez RN  Flowsheets (Taken 5/15/2025 0750)  Free from fall injury: Instruct family/caregiver on patient safety     Problem: Discharge Planning  Goal: Discharge to home or other facility with appropriate resources  5/15/2025 1553 by Milli Rodriguez RN  Outcome: Adequate for Discharge  5/15/2025 0750 by Milli Rodriguez RN  Flowsheets (Taken 5/15/2025 0750)  Discharge to home or other facility with appropriate resources:   Identify  barriers to discharge with patient and caregiver   Identify discharge learning needs (meds, wound care, etc)   Refer to discharge planning if patient needs post-hospital services based on physician order or complex needs related to functional status, cognitive ability or social support system   Arrange for needed discharge resources and transportation as appropriate     Problem: Chronic Conditions and Co-morbidities  Goal: Patient's chronic conditions and co-morbidity symptoms are monitored and maintained or improved  5/15/2025 1553 by Milli Rodriguez RN  Outcome: Adequate for Discharge  5/15/2025 0750 by Milli Rodriguez RN  Flowsheets (Taken 5/15/2025 0750)  Care Plan - Patient's Chronic Conditions and Co-Morbidity Symptoms are Monitored and Maintained or Improved:   Monitor and assess patient's chronic conditions and comorbid symptoms for stability, deterioration, or improvement   Collaborate with multidisciplinary team to address chronic and comorbid conditions and prevent exacerbation or deterioration   Update acute care plan with appropriate goals if chronic or comorbid symptoms are exacerbated and prevent overall improvement and discharge     Problem: Nutrition  Goal: Nutrient intake appropriate for maintaining nutritional needs  Outcome: Adequate for Discharge     Problem: Skin  Goal: Decreased wound size/increased tissue granulation at next dressing change  5/15/2025 1553 by Milli Rodriguez RN  Outcome: Adequate for Discharge  5/15/2025 0750 by Milli Rodriguez RN  Flowsheets (Taken 5/15/2025 0750)  Decreased wound size/increased tissue granulation at next dressing change: Promote sleep for wound healing  Goal: Participates in plan/prevention/treatment measures  5/15/2025 1553 by Milli Rodriguez RN  Outcome: Adequate for Discharge  5/15/2025 0750 by Milli Rodriguez RN  Flowsheets (Taken 5/15/2025 0750)  Participates in plan/prevention/treatment measures:   Discuss with provider PT/OT consult   Elevate  heels  Goal: Prevent/manage excess moisture  5/15/2025 1553 by Milli Rodriguez RN  Outcome: Adequate for Discharge  5/15/2025 0750 by Milli Rodriguez RN  Flowsheets (Taken 5/15/2025 0750)  Prevent/manage excess moisture:   Cleanse incontinence/protect with barrier cream   Follow provider orders for dressing changes   Moisturize dry skin   Monitor for/manage infection if present  Goal: Prevent/minimize sheer/friction injuries  5/15/2025 1553 by Milli Rodriguez RN  Outcome: Adequate for Discharge  5/15/2025 0750 by Milli Rodriguez RN  Flowsheets (Taken 5/15/2025 0750)  Prevent/minimize sheer/friction injuries:   Complete micro-shifts as needed if patient unable. Adjust patient position to relieve pressure points, not a full turn   Turn/reposition every 2 hours/use positioning/transfer devices   HOB 30 degrees or less   Use pull sheet   Increase activity/out of bed for meals  Goal: Promote/optimize nutrition  5/15/2025 1553 by Milli Rodriguez RN  Outcome: Adequate for Discharge  5/15/2025 0750 by Milli Rodriguez RN  Flowsheets (Taken 5/15/2025 0750)  Promote/optimize nutrition:   Monitor/record intake including meals   Offer water/supplements/favorite foods  Goal: Promote skin healing  5/15/2025 1553 by Milli Rodriguez RN  Outcome: Adequate for Discharge  5/15/2025 0750 by Milli Rodriguez RN  Flowsheets (Taken 5/15/2025 0750)  Promote skin healing:   Assess skin/pad under line(s)/device(s)   Rotate device position/do not position patient on device   Ensure correct size (line/device) and apply per  instructions   The patient's goals for the shift include      The clinical goals for the shift include Patient will remain free from episodes of dyspnea throughout this shift.    Over the shift, the patient did make progress adequate for discharge toward care plan goals.

## 2025-05-15 NOTE — PROGRESS NOTES
Brianne Mallory is a 80 y.o. female on day 3 of admission presenting with Other pulmonary embolism without acute cor pulmonale.     SUBJECTIVE      Patient was seen this evening. Feels well.     OBJECTIVE:      Last Recorded Vitals:  Vitals          Vitals:     05/14/25 0740 05/14/25 0852 05/14/25 1114 05/14/25 1532   BP: 126/70   112/59 107/56   Patient Position:           Pulse: 78   80 73   Resp:           Temp: 36.8 °C (98.2 °F)   36.9 °C (98.4 °F) 36.7 °C (98.1 °F)   TempSrc:           SpO2: 95% 97% 94% 99%   Weight:           Height:                 Last I/O:  I/O last 3 completed shifts:  In: 1109.7 (13.4 mL/kg) [P.O.:360; I.V.:749.7 (9 mL/kg)]  Out: 325 (3.9 mL/kg) [Urine:325 (0.1 mL/kg/hr)]  Weight: 83.1 kg      Physical Exam  Vitals reviewed.   Constitutional:       General: She is not in acute distress.     Appearance: Normal appearance. She is not toxic-appearing.   HENT:      Head: Normocephalic and atraumatic.   Eyes:      Extraocular Movements: Extraocular movements intact.      Conjunctiva/sclera: Conjunctivae normal.   Cardiovascular:      Rate and Rhythm: Normal rate and regular rhythm.      Pulses: Normal pulses.      Heart sounds: Normal heart sounds.   Pulmonary:      Effort: Pulmonary effort is normal. No respiratory distress.      Breath sounds: Normal breath sounds. No wheezing.   Abdominal:      General: Bowel sounds are normal. There is no distension.      Palpations: Abdomen is soft.      Tenderness: There is no abdominal tenderness. There is no guarding.   Musculoskeletal:         General: Normal range of motion.      Cervical back: Normal range of motion and neck supple.   Neurological:      General: No focal deficit present.      Mental Status: She is alert. Mental status is at baseline.   Psychiatric:         Mood and Affect: Mood normal.         Behavior: Behavior normal.         Thought Content: Thought content normal.               Inpatient Medications:  [Scheduled  Medications]    [Scheduled Medications]  apixaban, 10 mg, oral, BID   Followed by  [START ON 5/20/2025] apixaban, 5 mg, oral, BID  cyanocobalamin, 250 mcg, oral, Daily  tiotropium, 2 puff, inhalation, Daily   And  fluticasone furoate-vilanteroL, 1 puff, inhalation, Daily  levothyroxine, 88 mcg, oral, Daily  oxygen, , inhalation, Continuous - Inhalation  pantoprazole, 40 mg, oral, Daily   Or  pantoprazole, 40 mg, intravenous, Daily  sertraline, 50 mg, oral, Daily  simvastatin, 40 mg, oral, Nightly        PRN Medications  [PRN Medications]    [PRN Medications]  PRN medications: acetaminophen **OR** acetaminophen **OR** acetaminophen, benzonatate, dextromethorphan-guaifenesin, heparin, ondansetron **OR** ondansetron     Continuous Medications:  [Continuous Medications]    [Continuous Medications]     LABS AND IMAGING:      Labs:         Results from last 7 days   Lab Units 05/14/25 0534 05/13/25  0541 05/12/25  0413   WBC AUTO x10*3/uL 21.1* 20.3* 20.3*   RBC AUTO x10*6/uL 3.50* 3.48* 3.42*   HEMOGLOBIN g/dL 9.6* 9.4* 9.4*   HEMATOCRIT % 29.9* 29.5* 28.9*   MCV fL 85 85 85   MCH pg 27.4 27.0 27.5   MCHC g/dL 32.1 31.9* 32.5   RDW % 15.7* 15.6* 15.7*   PLATELETS AUTO x10*3/uL 428 428 355             Results from last 7 days   Lab Units 05/14/25  0534 05/13/25  0541 05/12/25  0413   SODIUM mmol/L 129* 129* 127*   POTASSIUM mmol/L 3.9 3.9 3.9   CHLORIDE mmol/L 97* 97* 99   CO2 mmol/L 26 25 24   BUN mg/dL 12 13 15   CREATININE mg/dL 0.61 0.57 0.55   GLUCOSE mg/dL 105* 119* 122*   PROTEIN TOTAL g/dL 5.8* 5.8* 5.8*   CALCIUM mg/dL 10.3 9.8 9.5   BILIRUBIN TOTAL mg/dL 0.4 0.3 0.3   ALK PHOS U/L 77 78 75   AST U/L 10 11 15   ALT U/L 7 7 8             Results from last 7 days   Lab Units 05/14/25  0534 05/13/25  0541 05/12/25  0413   MAGNESIUM mg/dL 1.59* 1.75 1.57*            Results from last 7 days   Lab Units 05/11/25  1826 05/11/25  1719   TROPHS ng/L 6 7      Imaging:  Transthoracic Echo Complete            [address]  [place] [id number]   Tel [phone] Fax [phone]     TRANSTHORACIC ECHOCARDIOGRAM REPORT     Patient Name:       JANELL MANJARREZ      Study Date:         5/12/2025             CONCLUSIONS:   1. The left ventricular systolic function is normal, with a visually estimated ejection fraction of 60-65%.   2. There is moderately reduced right ventricular systolic function.   3. The findings are consistent with cor pulmonale.   4. Moderately enlarged right ventricle.   5. The right atrial size is mild to moderately dilated.   6. Mild mitral valve regurgitation.   7. Moderate to severe tricuspid regurgitation.   8. Aortic valve stenosis is not present.   9. Pulmonary hypertension is present.  10. Severely elevated pulmonary artery pressure.  11. There is moderately increased septal and moderately increased posterior left ventricular wall thickness.   [id number] Joshua Shelby DO  Electronically signed on 5/12/2025 at 11:14:06 AM   ECG 12 Lead  Sinus rhythm with Premature supraventricular complexes  Right bundle branch block  Possible Lateral infarct , age undetermined  Abnormal ECG  When compared with ECG of 11-MAY-2025 17:05, (unconfirmed)  Left anterior fascicular block is no longer Present  ST no longer depressed in Lateral leads  T wave amplitude has decreased in Lateral leads  See ED provider note for full interpretation and clinical correlation  Confirmed by Loren Pratt ([id number]) on 5/12/2025 10:26:25 AM  ECG 12 Lead  Sinus rhythm with Premature atrial complexes  Right bundle branch block  Left anterior fascicular block  ** Bifascicular block **  Abnormal ECG  When compared with ECG of 23-DEC-2024 14:45,  Premature ventricular complexes are no longer Present  Premature atrial complexes are now Present  Nonspecific T wave abnormality has replaced inverted T waves in Inferior leads  T wave inversion now evident in Anterior leads  See ED provider note for full interpretation and clinical  correlation  Confirmed by Loren Pratt ([id number]) on 5/12/2025 10:26:15 AM     ASSESSMENT & PLAN:      Acute PE with patient's left segmental and subsegmental PE  - Hypercoagulable state with her history of adenocarcinoma of the lungs  - CTA chest reviewed  - Echocardiogram with moderately enlarged RV with reduced RV SF consistent with cor pulmonale  - Heparin infusion for 48 hours of heparin infusion from 5/11 to 5/13  - Transition to apixaban 10 mg twice daily x 7 days followed by 5 mg twice daily      Adenocarcinoma of the lung status post bilateral upper lobe resection  - Previous history of bilateral upper lobes that were resected, patient has declined chemotherapy and has not seen oncology since late 2024  - CTA chest showing new right-sided nodules and masses consistent with recurrence of disease  - No longer seeking care or treatment at this time  - Hospice following     Hyponatremia  - Likely SIADH, with history of cancer  - Sodium 127 on 5/11 and 12   - Follow-up hyponatremia workup including urine sodium urine osmolality serum osmolality TSH cortisol (added on to today's labs)     Leukocytosis, reactive to above  - No focal consolidation or source of infection, hold off on antibiotics     Generalized anxiety  Hyperlipidemia  Hypothyroidism  Essential hypertension  Continue home medications as reconciled     Likely discharge home tomorrow, with hospice.    I spent 25 minutes in the professional and overall care of this patient.      Rodger Silva MD

## 2025-05-15 NOTE — DISCHARGE SUMMARY
Discharge Diagnosis  Other pulmonary embolism without acute cor pulmonale    Issues Requiring Follow-Up  Home hospice    Discharge Meds     Medication List      ASK your doctor about these medications     acetaminophen 325 mg tablet; Commonly known as: Tylenol; Take 2 tablets   (650 mg) by mouth every 4 hours.   calcium citrate-vitamin D2 250 mg-2.5 mcg (100 unit) tablet   cyanocobalamin 250 mcg tablet; Commonly known as: Vitamin B-12   levothyroxine 88 mcg tablet; Commonly known as: Synthroid, Levoxyl   lidocaine 4 % patch; Place 2 patches over 12 hours on the skin once   daily. Remove & discard patch within 12 hours or as directed by MD.   pantoprazole 40 mg EC tablet; Commonly known as: ProtoNix; Take 1 tablet   (40 mg) by mouth once daily in the morning. Take before meals. Do not   crush, chew, or split.   sertraline 25 mg tablet; Commonly known as: Zoloft   simvastatin 40 mg tablet; Commonly known as: Zocor   traMADol 50 mg tablet; Commonly known as: Ultram; Take 1 tablet (50 mg)   by mouth every 6 hours if needed (pain).   Trelegy Ellipta 200-62.5-25 mcg blister with device; Generic drug:   fluticasone-umeclidin-vilanter       Test Results Pending At Discharge  Pending Labs       Order Current Status    Blood Culture Preliminary result    Blood Culture Preliminary result            Hospital Course  Brianne Mallory is a 80 y.o. female with a significant past medical history of GERTRUDE NSCLC s/p VATs w GERTRUDE complete lobectomy [01/2021], HTN, HLD, hypothyroid, syncope and remote hx of DVT and PE presenting to [facility] with c/o shortness of breath.    Acute provoked classifying this as a provoked PE with patient's left segmental and subsegmental PE  - Hypercoagulable state with her history of adenocarcinoma of the lungs  - CTA chest reviewed  - Echocardiogram with moderately enlarged RV with reduced RV SF consistent with cor pulmonale  - Heparin infusion for 48 hours of heparin infusion from 5/11 to 5/13  - Transition  to apixaban 10 mg twice daily x 7 days followed by 5 mg twice daily      Adenocarcinoma of the lung status post bilateral upper lobe resection  - Previous history of bilateral upper lobes that were resected, patient has declined chemotherapy and has not seen oncology since late 2024  - CTA chest showing new right-sided nodules and masses consistent with recurrence of disease  - No longer seeking care or treatment at this time  - Hospice following     Hyponatremia  - Likely SIADH, with history of cancer  - Sodium improved to 129 on day of discharge     Patient discharged home to start hospice care on 5/15/25.    > 30min in discharge coordination of care which includes: discharge planning, medication reconciliation, arranging appropriate follow up and discussion of discharge instructions with the patient.      Pertinent Physical Exam At Time of Discharge  Physical Exam  Vitals reviewed.   Constitutional:       General: She is not in acute distress.     Appearance: Normal appearance. She is not toxic-appearing.   HENT:      Head: Normocephalic and atraumatic.   Eyes:      Extraocular Movements: Extraocular movements intact.      Conjunctiva/sclera: Conjunctivae normal.   Cardiovascular:      Rate and Rhythm: Normal rate and regular rhythm.      Pulses: Normal pulses.      Heart sounds: Normal heart sounds.   Pulmonary:      Effort: Pulmonary effort is normal. No respiratory distress.      Breath sounds: Normal breath sounds. No wheezing.   Abdominal:      General: Bowel sounds are normal. There is no distension.      Palpations: Abdomen is soft.      Tenderness: There is no abdominal tenderness. There is no guarding.   Musculoskeletal:         General: Normal range of motion.      Cervical back: Normal range of motion and neck supple.   Neurological:      General: No focal deficit present.      Mental Status: She is alert. Mental status is at baseline.   Psychiatric:         Mood and Affect: Mood normal.          Behavior: Behavior normal.         Thought Content: Thought content normal.        Outpatient Follow-Up  No future appointments.      Vinnie Esteves DO

## 2025-05-15 NOTE — PROGRESS NOTES
Occupational Therapy                 Therapy Communication Note    Patient Name: Brianne Mallory  MRN: [id number]  Department: [facility]  Room[facility]-A  Today's Date: 5/15/2025     Discipline: Occupational Therapy    Missed Visit: OT Missed Visit: Yes     Missed Visit Reason: Missed Visit Reason:  (D/C OT)    Missed Time: Attempt    Comment: Per EMR, plan for discharge with Hospice services. Will discharge OT order.

## 2025-05-16 LAB
BACTERIA BLD CULT: NORMAL
BACTERIA BLD CULT: NORMAL

## (undated) DEVICE — SPONGE GAUZE, XRAY SC+RFID, 4X4 16 PLY, STERILE

## (undated) DEVICE — ACCESS PORT, 12MM, 100MM LENGTH, LOW PROFILE W/BLADELESS OPTICAL TIP

## (undated) DEVICE — SHEET, SPLIT, CARDIOVASCULAR TIBURON

## (undated) DEVICE — GRASPER, FENESTRATED TIP-UP XI

## (undated) DEVICE — SPONGE, HEMOSTATIC, CELLULOSE, SURGICEL, NU-KNIT, 3 X 4 IN

## (undated) DEVICE — DRAPE, ARM XI

## (undated) DEVICE — STRIP, SKIN CLOSURE, STERI STRIP, REINFORCED, 0.5 X 4 IN

## (undated) DEVICE — SEAL, UNIVERSAL, 5-12MM

## (undated) DEVICE — CATHETER, THORACIC, STRAIGHT, SOFT, TAPER TIP, 32 FR

## (undated) DEVICE — SUTURE, ETHIBOND, XTRA, 30 IN, 0, CT-1, GREEN

## (undated) DEVICE — DRAPE, INCISE, ANTIMICROBIAL, IOBAN 2, LARGE, 17 X 23 IN, DISPOSABLE, STERILE

## (undated) DEVICE — APPLIER, CLIP MED-LG XI

## (undated) DEVICE — DRESSING, ADHESIVE, ISLAND, TELFA, 2 X 3.75 IN, LF

## (undated) DEVICE — COLLECTION UNIT, DRAINAGE, THORACIC, SINGLE TUBE, DRY SUCTION, ATS COMPATIBLE, OASIS 3600, LF

## (undated) DEVICE — SUTURE, MONOCRYL, 4-0, 18 IN, PS2, UNDYED

## (undated) DEVICE — GOWN, ASTOUND, XL

## (undated) DEVICE — TUBING, SUCTION, CONNECTING, STERILE 0.25 X 120 IN., LF

## (undated) DEVICE — GAUZE, KITTNER ROLL, STERILE

## (undated) DEVICE — Device

## (undated) DEVICE — STAPLER, SUREFORM 45, DAVINCI XI

## (undated) DEVICE — ELECTRODE, ELECTROSURGICAL, BLADE, INSULATED, ENT/IMA, STERILE

## (undated) DEVICE — DRESSING, ISLAND, TELFA, 4 X 5 IN

## (undated) DEVICE — LOOP, VESSEL, MAXI, RED

## (undated) DEVICE — CATHETER TRAY, SURESTEP, 16FR, URINE METER W/STATLOCK

## (undated) DEVICE — COVER, CART, 45 X 27 X 48 IN, CLEAR

## (undated) DEVICE — PUMP, STRYKERFLOW 2 & HANDPIECE W/10FT. IRRIGATION TUBING

## (undated) DEVICE — MANIFOLD, 4 PORT NEPTUNE STANDARD

## (undated) DEVICE — DRAPE, TIBURON, SPLIT SHEET, REINF ADH STRIP, 77X122

## (undated) DEVICE — KIT, ROBOTIC, CUSTOM UHC

## (undated) DEVICE — CLIP, LIGATING, HEM-O-LOCK, MEDIUM/LARGE, LF, GREEN

## (undated) DEVICE — TOWEL, SURGICAL, NEURO, O/R, 16 X 26, BLUE, STERILE

## (undated) DEVICE — TUBING SET, TRI-LUMEN, FILTERED, F/AIRSEAL

## (undated) DEVICE — GRASPER, LONG, BIPOLAR, DAVINCI XI

## (undated) DEVICE — RELOAD, SUREFORM 45, 2.5 WHITE

## (undated) DEVICE — SPONGE, LAP, XRAY DECT, 18IN X 18IN, W/LOOP, STERILE

## (undated) DEVICE — SUTURE, VICRYL, 2-0, 36 IN, CT-1, UNDYED

## (undated) DEVICE — OBTURATOR, BLADELESS , SU

## (undated) DEVICE — DRAPE, COLUMN, DAVINCI XI

## (undated) DEVICE — FORCEPS, CADIERE, DAVINCI XI

## (undated) DEVICE — RELOAD, SUREFORM 45, 4.6 BLACK